# Patient Record
Sex: MALE | Race: BLACK OR AFRICAN AMERICAN
[De-identification: names, ages, dates, MRNs, and addresses within clinical notes are randomized per-mention and may not be internally consistent; named-entity substitution may affect disease eponyms.]

---

## 2019-10-07 ENCOUNTER — HOSPITAL ENCOUNTER (EMERGENCY)
Dept: HOSPITAL 9 - MADERS | Age: 74
Discharge: HOME | End: 2019-10-07
Payer: MEDICARE

## 2019-10-07 DIAGNOSIS — F41.9: ICD-10-CM

## 2019-10-07 DIAGNOSIS — Z79.899: ICD-10-CM

## 2019-10-07 DIAGNOSIS — S01.01XA: ICD-10-CM

## 2019-10-07 DIAGNOSIS — K21.9: ICD-10-CM

## 2019-10-07 DIAGNOSIS — Z23: ICD-10-CM

## 2019-10-07 DIAGNOSIS — Z79.891: ICD-10-CM

## 2019-10-07 DIAGNOSIS — F20.9: ICD-10-CM

## 2019-10-07 DIAGNOSIS — Z79.82: ICD-10-CM

## 2019-10-07 DIAGNOSIS — S06.0X0A: Primary | ICD-10-CM

## 2019-10-07 DIAGNOSIS — E11.9: ICD-10-CM

## 2019-10-07 DIAGNOSIS — Z79.84: ICD-10-CM

## 2019-10-07 DIAGNOSIS — I10: ICD-10-CM
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
## 2019-10-07 PROCEDURE — 90715 TDAP VACCINE 7 YRS/> IM: CPT

## 2019-10-07 PROCEDURE — 90471 IMMUNIZATION ADMIN: CPT

## 2019-10-07 PROCEDURE — 72125 CT NECK SPINE W/O DYE: CPT

## 2019-10-07 PROCEDURE — 70450 CT HEAD/BRAIN W/O DYE: CPT

## 2019-10-07 NOTE — CT
CT Brain WO Con



History: Trauma



Comparison: None.



Findings: No acute hemorrhage or infarct. No midline shift or mass effect. Ventricular size and extra
-axial CSF spaces are normal.



Since mucosal thickening right maxillary sinus with near-complete opacification.



Globes are intact.



Impression: No acute posttraumatic intracranial sequelae.



Reported By: Rafael Tabares 

Electronically Signed:  10/7/2019 8:44 PM

## 2019-10-07 NOTE — CT
CT Cervical Spine WO Con



History: Trauma



Comparison: None.



Findings: The occipital condyles are intact. The odontoid process is intact. Nuchal ligament ossifica
tion, chronic in nature.



Multifocal Schmorl's nodes and degenerative disc space disease, greatest at C5-T1. Large anterior jay
dging osteophytes C3-T1



No acute traumatic facet joint widening. Dense calcifications of both carotid bulbs/proximal internal
 carotid arteries.



The lung apices are clear.



Impression: Extensive degenerative changes. No acute fracture or malalignment of the cervical spine.



Reported By: Rafael Tabares 

Electronically Signed:  10/7/2019 8:46 PM

## 2019-12-04 ENCOUNTER — HOSPITAL ENCOUNTER (EMERGENCY)
Dept: HOSPITAL 9 - MADERS | Age: 74
Discharge: HOME | End: 2019-12-04
Payer: MEDICARE

## 2019-12-04 DIAGNOSIS — Z79.84: ICD-10-CM

## 2019-12-04 DIAGNOSIS — F17.210: ICD-10-CM

## 2019-12-04 DIAGNOSIS — F20.9: ICD-10-CM

## 2019-12-04 DIAGNOSIS — Z71.6: ICD-10-CM

## 2019-12-04 DIAGNOSIS — I10: ICD-10-CM

## 2019-12-04 DIAGNOSIS — F41.9: ICD-10-CM

## 2019-12-04 DIAGNOSIS — J06.9: Primary | ICD-10-CM

## 2019-12-04 DIAGNOSIS — E11.9: ICD-10-CM

## 2019-12-04 DIAGNOSIS — Z79.899: ICD-10-CM

## 2019-12-04 PROCEDURE — 99406 BEHAV CHNG SMOKING 3-10 MIN: CPT

## 2019-12-04 PROCEDURE — 71046 X-RAY EXAM CHEST 2 VIEWS: CPT

## 2019-12-04 PROCEDURE — 87804 INFLUENZA ASSAY W/OPTIC: CPT

## 2019-12-04 NOTE — RAD
EXAM:

Chest Two Views



12/4/2019 7:56 AM



HISTORY:

Cough



COMPARISON:

Single view chest radiograph dated February 25, 2014



FINDINGS:

Heart: Mild cardiomegaly is stable



Pulmonary vessels: Normal.



Costophrenic angles: Clear.



Lungs: No acute airspace consolidation.



Pneumothorax: None.



Osseous structures:Intact. There is scattered degenerative and osteoarthritic change present.



Additional findings:   None.



IMPRESSION:

Stable mild cardiomegaly. No acute cardiopulmonary abnormality.



Reported By: Wilfredo Wright 

Electronically Signed:  12/4/2019 7:56 AM

## 2020-09-27 ENCOUNTER — HOSPITAL ENCOUNTER (INPATIENT)
Dept: HOSPITAL 92 - ERS | Age: 75
LOS: 1 days | Discharge: LEFT BEFORE DISCHARGE | DRG: 308 | End: 2020-09-28
Attending: STUDENT IN AN ORGANIZED HEALTH CARE EDUCATION/TRAINING PROGRAM | Admitting: STUDENT IN AN ORGANIZED HEALTH CARE EDUCATION/TRAINING PROGRAM
Payer: MEDICARE

## 2020-09-27 ENCOUNTER — HOSPITAL ENCOUNTER (EMERGENCY)
Dept: HOSPITAL 9 - MADERS | Age: 75
Discharge: ANOTHER HOSPITAL | End: 2020-09-27
Payer: MEDICARE

## 2020-09-27 VITALS — BODY MASS INDEX: 37 KG/M2

## 2020-09-27 DIAGNOSIS — I11.0: ICD-10-CM

## 2020-09-27 DIAGNOSIS — F20.9: ICD-10-CM

## 2020-09-27 DIAGNOSIS — E78.5: ICD-10-CM

## 2020-09-27 DIAGNOSIS — Z90.49: ICD-10-CM

## 2020-09-27 DIAGNOSIS — I25.10: ICD-10-CM

## 2020-09-27 DIAGNOSIS — F41.9: ICD-10-CM

## 2020-09-27 DIAGNOSIS — Z53.29: ICD-10-CM

## 2020-09-27 DIAGNOSIS — Z79.84: ICD-10-CM

## 2020-09-27 DIAGNOSIS — K21.9: ICD-10-CM

## 2020-09-27 DIAGNOSIS — I51.7: ICD-10-CM

## 2020-09-27 DIAGNOSIS — E11.9: ICD-10-CM

## 2020-09-27 DIAGNOSIS — I11.0: Primary | ICD-10-CM

## 2020-09-27 DIAGNOSIS — Z79.899: ICD-10-CM

## 2020-09-27 DIAGNOSIS — Z79.82: ICD-10-CM

## 2020-09-27 DIAGNOSIS — Z20.828: ICD-10-CM

## 2020-09-27 DIAGNOSIS — F17.220: ICD-10-CM

## 2020-09-27 DIAGNOSIS — R07.89: ICD-10-CM

## 2020-09-27 DIAGNOSIS — M10.9: ICD-10-CM

## 2020-09-27 DIAGNOSIS — R79.89: ICD-10-CM

## 2020-09-27 DIAGNOSIS — Z87.891: ICD-10-CM

## 2020-09-27 DIAGNOSIS — I48.91: Primary | ICD-10-CM

## 2020-09-27 DIAGNOSIS — E87.70: ICD-10-CM

## 2020-09-27 DIAGNOSIS — I50.23: ICD-10-CM

## 2020-09-27 DIAGNOSIS — I48.91: ICD-10-CM

## 2020-09-27 DIAGNOSIS — E83.42: ICD-10-CM

## 2020-09-27 DIAGNOSIS — I50.9: ICD-10-CM

## 2020-09-27 LAB
ALBUMIN SERPL BCG-MCNC: 3.8 G/DL (ref 3.4–4.8)
ALP SERPL-CCNC: 70 U/L (ref 40–110)
ALT SERPL W P-5'-P-CCNC: 13 U/L (ref 8–55)
ANION GAP SERPL CALC-SCNC: 19 MMOL/L (ref 10–20)
AST SERPL-CCNC: 19 U/L (ref 5–34)
BILIRUB SERPL-MCNC: 0.4 MG/DL (ref 0.2–1.2)
BUN SERPL-MCNC: 14 MG/DL (ref 8.4–25.7)
CALCIUM SERPL-MCNC: 8.9 MG/DL (ref 7.8–10.44)
CHLORIDE SERPL-SCNC: 105 MMOL/L (ref 98–107)
CK MB SERPL-MCNC: 2 NG/ML (ref 0–6.6)
CO2 SERPL-SCNC: 19 MMOL/L (ref 23–31)
CREAT CL PREDICTED SERPL C-G-VRATE: 0 ML/MIN (ref 70–130)
GLOBULIN SER CALC-MCNC: 3.4 G/DL (ref 2.4–3.5)
GLUCOSE SERPL-MCNC: 167 MG/DL (ref 83–110)
HGB BLD-MCNC: 13.2 G/DL (ref 14–18)
MAGNESIUM SERPL-MCNC: 1.7 MG/DL (ref 1.6–2.6)
MCH RBC QN AUTO: 27.4 PG (ref 27–31)
MCV RBC AUTO: 86 FL (ref 78–98)
MDIFF COMPLETE?: YES
PLATELET # BLD AUTO: 321 THOU/UL (ref 130–400)
POTASSIUM SERPL-SCNC: 3.9 MMOL/L (ref 3.5–5.1)
RBC # BLD AUTO: 4.82 MILL/UL (ref 4.7–6.1)
REFLEX FOR REVIEW??: NO
SODIUM SERPL-SCNC: 139 MMOL/L (ref 136–145)
TROPONIN I SERPL DL<=0.01 NG/ML-MCNC: 0.05 NG/ML (ref ?–0.03)
WBC # BLD AUTO: 7.2 THOU/UL (ref 4.8–10.8)

## 2020-09-27 PROCEDURE — 85730 THROMBOPLASTIN TIME PARTIAL: CPT

## 2020-09-27 PROCEDURE — 85025 COMPLETE CBC W/AUTO DIFF WBC: CPT

## 2020-09-27 PROCEDURE — 93017 CV STRESS TEST TRACING ONLY: CPT

## 2020-09-27 PROCEDURE — G0378 HOSPITAL OBSERVATION PER HR: HCPCS

## 2020-09-27 PROCEDURE — 82553 CREATINE MB FRACTION: CPT

## 2020-09-27 PROCEDURE — 96374 THER/PROPH/DIAG INJ IV PUSH: CPT

## 2020-09-27 PROCEDURE — 99285 EMERGENCY DEPT VISIT HI MDM: CPT

## 2020-09-27 PROCEDURE — 36415 COLL VENOUS BLD VENIPUNCTURE: CPT

## 2020-09-27 PROCEDURE — 96366 THER/PROPH/DIAG IV INF ADDON: CPT

## 2020-09-27 PROCEDURE — 96375 TX/PRO/DX INJ NEW DRUG ADDON: CPT

## 2020-09-27 PROCEDURE — 96367 TX/PROPH/DG ADDL SEQ IV INF: CPT

## 2020-09-27 PROCEDURE — U0003 INFECTIOUS AGENT DETECTION BY NUCLEIC ACID (DNA OR RNA); SEVERE ACUTE RESPIRATORY SYNDROME CORONAVIRUS 2 (SARS-COV-2) (CORONAVIRUS DISEASE [COVID-19]), AMPLIFIED PROBE TECHNIQUE, MAKING USE OF HIGH THROUGHPUT TECHNOLOGIES AS DESCRIBED BY CMS-2020-01-R: HCPCS

## 2020-09-27 PROCEDURE — 93005 ELECTROCARDIOGRAM TRACING: CPT

## 2020-09-27 PROCEDURE — 83880 ASSAY OF NATRIURETIC PEPTIDE: CPT

## 2020-09-27 PROCEDURE — 78452 HT MUSCLE IMAGE SPECT MULT: CPT

## 2020-09-27 PROCEDURE — 84443 ASSAY THYROID STIM HORMONE: CPT

## 2020-09-27 PROCEDURE — 36416 COLLJ CAPILLARY BLOOD SPEC: CPT

## 2020-09-27 PROCEDURE — 93306 TTE W/DOPPLER COMPLETE: CPT

## 2020-09-27 PROCEDURE — 80048 BASIC METABOLIC PNL TOTAL CA: CPT

## 2020-09-27 PROCEDURE — 80053 COMPREHEN METABOLIC PANEL: CPT

## 2020-09-27 PROCEDURE — 71045 X-RAY EXAM CHEST 1 VIEW: CPT

## 2020-09-27 PROCEDURE — 83735 ASSAY OF MAGNESIUM: CPT

## 2020-09-27 PROCEDURE — 94760 N-INVAS EAR/PLS OXIMETRY 1: CPT

## 2020-09-27 PROCEDURE — 96365 THER/PROPH/DIAG IV INF INIT: CPT

## 2020-09-27 PROCEDURE — 93010 ELECTROCARDIOGRAM REPORT: CPT

## 2020-09-27 PROCEDURE — 87635 SARS-COV-2 COVID-19 AMP PRB: CPT

## 2020-09-27 PROCEDURE — 84484 ASSAY OF TROPONIN QUANT: CPT

## 2020-09-27 PROCEDURE — A9500 TC99M SESTAMIBI: HCPCS

## 2020-09-27 NOTE — RAD
Chest AP view



INDICATION: Chest pain with shortness of breath



COMPARISON: December 4, 2019



FINDINGS:



Lungs:  The lungs are clear



Cardiac silhouette:  Stable mild cardiomegaly



Pulmonary vasculature:  Normal



Pleural spaces:  No pleural effusion or pneumothorax is demonstrated.



Upper abdomen:  No abnormality seen.



Osseous structures:  No acute osseous abnormality.



Additional findings:  None.



IMPRESSION: 

Stable mild cardiomegaly



Reported By: Wilfredo Wright 

Electronically Signed:  9/27/2020 2:57 PM

## 2020-09-27 NOTE — PDOC.HHP
Hospitalist HPI





- History of Present Illness


chest pain, a-fib


History of Present Illness: 





Mr. Wick is a 75 year-old male with a PMHx of T2DM, HTN, HLD< gout, anxiety, 

schizophrenia, tobacco abuse who was transferred from [facility] for chest pain 

r/o and new-onset a-fib. Mr. Wick reports that over the past two weeks he has 

had chest discomforts which he describes as a pressure at the bottom of his hoa

rnum. Also endorses sour-stomach, but denies SOB, palpitations, dizziness. 

Denies peluritic pain. Pain is intermittent and not associated with food. Denies

aggravating/alleviating factors. States he has been to the ER a few times a year

for chest pain, but that they never find anything. No cardiac history. Pt 

reports his ENT doctor told him he was in a-fib once when he listened to his 

heart, but has never seen a cardiologist or had his PCP work this up. 





CHADsVASc score of 4 (CHF, HTN, Age, DM). Patient denies any history of 

uncontrolled HTN, GIB, or any other bleeding disorders. 





In the ED vital signs 125/87, 106, 20, 99.0, 98% on RA. EKG showed a-fib with 

ventricular rate of 110. CXR with mild cardiomegaly. BNP elevated to 395, 

Troponin elevated at 0.040, H/H 13.2/41.5, WBC 7.2. CKMB 2.0. Na 139, K 3.0, 

BUN/Cr 14/1.47. Patient received 325 mg of ASA and 20 mg furosemide IV.   





Hospitalist ROS





- Review of Systems


Constitutional: denies: fever, chills, sweats, weakness, malaise, other


Eyes: denies: pain, vision change, conjunctivae inflammation, eyelid 

inflammation, redness, other


ENT: denies: ear pain, ear discharge, nose pain, nose discharge, nose 

congestion, mouth pain, mouth swelling, throat pain, throat swelling, other


Respiratory: denies: cough, dry, shortness of breath, hemoptysis, SOB with 

excertion, pleuritic pain, sputum, wheezing, other


Cardiovascular: reports: chest pain.  denies: palpitations, orthopnea, 

paroxysmal noc. dyspnea, edema, light headedness, other


Gastrointestinal: denies: nausea, vomiting, abdominal pain, diarrhea, 

constipation, melena, hematochezia, other


Genitourinary: denies: dysuria, frequency, incontinence, hematuria, retention, 

other


Musculoskeletal: denies: neck pain, shoulder pain, arm pain, back pain, hand 

pain, leg pain, foot pain, other


Skin: denies: rash, lesions, soren, bruising, other


Neurological: denies: weakness, numbness, incoordination, change in speech, 

confusion, seizures, other





- Medication


Medications: 


Home medications include: 





HCTZ 25 mg daily


Risperdal 0.5 mg qhs


Simethicone 80 mg with meals


Atorvastatin 10 mg daily


Protonix 40 mg daily


Metfromin 500 mg BID


ASA 81 mg daily


Amlodipine 10 mg daily


Naproxen 250 mg daily


VItamin D


Trazodone 100 mg qhs 


Fluoxetine 20 mg daily


allopurinol 300 mg daily


loratadine 10 mg prn


hydroxyzine 50 mg prn anxiety


ativan 2 mg prn anxiety





NKDA





Hospitalist History





- Past Medical History


Other Medical History: 





Type 2 DM


HTN


HLD


Schizophrenia


Gout


Tobacco use


GERD





No history of cardiac disease, although pt states he has had episodes of chest 

pain in the past which have all been negative. Has been years since had a stress

test. 





- Past Surgical History


Other Surgical History: 





appendectomy





- Family History


Other Family History: 





cardiac 





- Social History


Smoking Status: Current every day smoker


Tobacco Type: snuff


Alcohol: reports: None


Drugs: reports: none


Living Situation: With Family


Activity level: independent ambulation





- Exam


General Appearance: NAD, awake alert


Eye: PERRL, anicteric sclera


ENT: normocephalic atraumatic, no oropharyngeal lesions, moist mucosa


Neck: supple, symmetric, no JVD, no thyromegaly, no lymphadenopathy, no carotid 

bruit


Heart: no murmur, no gallops, no rubs, normal peripheral pulses, irregular


Respiratory: CTAB, no wheezes, no rales, no ronchi, normal chest expansion, no 

tachypnea, normal percussion


Gastrointestinal: soft, non-tender, non-distended, normal bowel sounds, no 

palpable masses, no hepatomegaly, no splenomegaly, no bruit


Extremities: no cyanosis, 2+ LE edema


Skin: normal turgor, no lesions, no rashes


Neurological: cranial nerve grossly intact, normal sensation to touch, no 

weakness, no focal deficits, no new deficit


Musculoskeletal: normal tone, normal strength, no muscle wasting


Psychiatric: normal affect, normal behavior, A&O x 3





Hospitalist Results





- Labs


Result Diagrams: 


                                 09/27/20 22:58








Hospitalist H&P A/P





- Problem


(1) Chest pain


Code(s): R07.9 - CHEST PAIN, UNSPECIFIED   Status: Acute   





(2) Atrial fibrillation


Code(s): I48.91 - UNSPECIFIED ATRIAL FIBRILLATION   Status: Acute   





(3) Elevated troponin


Code(s): R79.89 - OTHER SPECIFIED ABNORMAL FINDINGS OF BLOOD CHEMISTRY   Status:

Acute   





(4) Type 2 diabetes mellitus


Status: Acute   





(5) Hypertension


Code(s): I10 - ESSENTIAL (PRIMARY) HYPERTENSION   Status: Acute   





(6) Hyperlipidemia


Code(s): E78.5 - HYPERLIPIDEMIA, UNSPECIFIED   Status: Acute   





(7) GERD (gastroesophageal reflux disease)


Code(s): K21.9 - GASTRO-ESOPHAGEAL REFLUX DISEASE WITHOUT ESOPHAGITIS   Status: 

Acute   





(8) Schizophrenia


Code(s): F20.9 - SCHIZOPHRENIA, UNSPECIFIED   Status: Acute   





(9) Gout


Code(s): M10.9 - GOUT, UNSPECIFIED   Status: Acute   





- Plan


Plan: 





Chest pain: 


75M hx of HTN, HLD, T2DM presents with 2 weeks of intermittent chest pain 

described as a substernal pressure found to be in atrial fibrillation. Initial 

troponin 0.040. EKG showed a-fib with ventricular rate of 110. Pt HD stable. 

Received  at OSH. Pt denies history of cardiac problems. Last stress test

was years ago. 





PLAN:


-Telemetry


-Trend troponins


-Stress test in am if troponins do not rise


-Echocardiogram





Atrial fibrillation:


New onset a-fib in 75M. CHADsVASc score 4. Pt denies history of GIB, but does 

take naproxen and ASA regularly. No recent surgeries or bleeding disorders. 

Patient describes chest discomfort likely 2/2 to his a-fib for the past two 

weeks, likely out of the 48 hr window. Will initiate anticoagulation with 

heparin drip and initiate rate control with IV metoprolol. Cardiology input 

appreciated. 





PLAN:


-Echocardiogram


-TSH, Mg, Ca, Phos


-Cardiology consult


-Metoprolol 


-AC with heparin drip





Elevation in Cr level: 


BUN/Cr 14/1.47. Unsure of baseline, but Cr in 2015 was 1.1, ? MARIO. Pt denies 

history of CKD, although has not seen PCP in years. May be component of 

cardiorenal 2/2 CHF, so will hold off on IVF and monitor at this time.





PLAN:


-Trend renal function


-Avoid nephrotoxic medications when possible 


 


Congestive Heart Failure: 


Pt p/w chest discomfort. Found to have mild JVD and 2+ LE edema on exam. BNP 

elevated to 395. CXR with stable cardiomegaly. Received 20 mg lasix IV at OSH. 

Lungs clear. 2+ edema. Respiratory status stable. No echo on file. Pt denies 

history of CHF. 





PLAN:


-Echocardiogram


-Cardiology consult


-Continue to monitor respiratory and fluid status





Hypomagnesemia: 


Mg low normal of 1.7. Will replete to maintain >2 for cardiac optimization. 





PLAN:


-Magnesium 2 g IV


-Monitor and replete as needed





Type 2 DM: 


Hx of T2DM on metformin. Will hold in setting of elevated Cr. 





PLAN:


-ISS mild


-CC diet


-ACHS glucose checks





Schizophrenia: 


Hx of schizophrenia on risperdal, ativan, fluoxetine, and traxodone. Will 

continue home medications. Mood stable. 





Hypertension: 


On home amlodipine, HCTZ. Will hold to allow for room in BP to rate-control a-

fib.  





Hyperlipidemia: 


On home atorvastatin. Continue. Will check lipid level in am. 





GERD:


On home protonix, simethicone. Will continue home meds. 





Gout: 


Continue home allopurinol. Pt denies any current symptoms. 





DVT prophylaxis: Heparin drip 2/2 a-fib


FULL CODE: MDM Pt's sister Boubacar Wick.





Case discussed with attending physician, Dr. Renee.

## 2020-09-28 VITALS — DIASTOLIC BLOOD PRESSURE: 88 MMHG | SYSTOLIC BLOOD PRESSURE: 136 MMHG | TEMPERATURE: 99.1 F

## 2020-09-28 LAB
ANION GAP SERPL CALC-SCNC: 14 MMOL/L (ref 10–20)
BUN SERPL-MCNC: 17 MG/DL (ref 8.4–25.7)
CALCIUM SERPL-MCNC: 8.8 MG/DL (ref 7.8–10.44)
CHLORIDE SERPL-SCNC: 107 MMOL/L (ref 98–107)
CK MB SERPL-MCNC: 1.6 NG/ML (ref 0–6.6)
CO2 SERPL-SCNC: 21 MMOL/L (ref 23–31)
CREAT CL PREDICTED SERPL C-G-VRATE: 66 ML/MIN (ref 70–130)
GLUCOSE SERPL-MCNC: 124 MG/DL (ref 83–110)
HGB BLD-MCNC: 13.1 G/DL (ref 14–18)
HGB BLD-MCNC: 13.4 G/DL (ref 14–18)
MAGNESIUM SERPL-MCNC: 2.1 MG/DL (ref 1.6–2.6)
MCH RBC QN AUTO: 30.1 PG (ref 27–31)
MCV RBC AUTO: 88.5 FL (ref 78–98)
MDIFF COMPLETE?: YES
PLATELET # BLD AUTO: 259 THOU/UL (ref 130–400)
PLATELET # BLD AUTO: 278 THOU/UL (ref 130–400)
POTASSIUM SERPL-SCNC: 4.3 MMOL/L (ref 3.5–5.1)
RBC # BLD AUTO: 4.46 MILL/UL (ref 4.7–6.1)
SODIUM SERPL-SCNC: 138 MMOL/L (ref 136–145)
TROPONIN I SERPL DL<=0.01 NG/ML-MCNC: 0.05 NG/ML (ref ?–0.03)
WBC # BLD AUTO: 7 THOU/UL (ref 4.8–10.8)

## 2020-09-28 NOTE — CON
DATE OF CONSULTATION:  



HISTORY:  Louie Wick is a 75-year-old black male, who was transferred from

[facility] for evaluation of chest discomfort and atrial 
fibrillation.

Over the last 2 weeks, he did have episodes of chest pressure, some of which 
would

last for 1 or 2 days continuously.  He also has had some shortness of breath and

palpitations.  He was told approximately 2 years ago by his allergist that he 
had an

abnormal heart rhythm and was probably in atrial fibrillation.  He has not had 
that

evaluated though.  He has been found to have an abnormal Cardiolite and 
Cardiology

consultation is requested.  At the present time, the patient denies any 
symptoms. 



PAST MEDICAL HISTORY:  Remarkable for hypertension, diabetes, 
hypercholesterolemia,

schizophrenia, gout, and GERD. 



PAST SURGICAL HISTORY:  Appendectomy.



MEDICATIONS:  

1. Hydrochlorothiazide daily.  

2. Risperdal 0.5 at bedtime. 

3. Atorvastatin 10 mg daily.

4. Protonix 40 daily.

5. Metformin 500 b.i.d.

6. Aspirin 81 daily.

7. Amlodipine 10 mg daily.

8. Naproxen 250 2 tablets q.a.m.

9. Trazodone 100 mg at bedtime.

10. Fluoxetine 20 daily.

11. Allopurinol 300 daily.

12. Loratadine 10 mg p.r.n.

13. Hydroxyzine 50 mg p.r.n.

14. Ativan 2 mg p.r.n. anxiety.



ALLERGIES:  NONE.



SOCIAL HISTORY:  He smokes 4 or 5 cigarettes per day and dips snuff.  He does 
not

drink alcohol. 



REVIEW OF SYSTEMS:  A 10-point review of systems unremarkable.



PHYSICAL EXAMINATION:

VITAL SIGNS: Blood pressure 112/57, pulse of 80. 

HEENT: PERRL. 

NECK:  Supple. 

CHEST:  Clear. 

CARDIAC:  S1 and S2 normal without any S3, S4, or murmurs.  

ABDOMEN: Normal bowel sounds without tenderness or organomegaly. 

EXTREMITIES: Revealed no clubbing, cyanosis, or edema.  

NEUROLOGIC: Grossly intact. 

SKIN: Warm and dry.



LABORATORY DATA:  EKG reveals atrial fibrillation with nonspecific ST and T-wave

changes.  Hemoglobin 13.4, hematocrit 39.5, white count 7000, platelets 259,000.

Sodium 138, potassium 4.3, chloride 107 carbon dioxide 21, BUN 17, creatinine 
1.31;

creatinine was 1.47 at admission.  Troponin I is 0.054.  TSH is normal.  
Adenosine

Cardiolite test revealed a fixed anteroseptal and posterior inferior wall 
defects

There was no evidence of reversible ischemia.  Ejection fraction was 23% with

global hypokinesis, especially the septum. 



IMPRESSION:  

1. Severe left ventricular dysfunction with ejection fraction on Cardiolite of 
23%.

2. Fixed myocardial defects of the anteroseptal wall and the posterior inferior

wall, probable coronary artery disease. 

3. Hypertension. 

4. Diabetes.

5. Hypercholesterolemia.

6. Smoker.

7. History of schizophrenia.



PLAN:  With left ventricular dysfunction, metoprolol will be discontinued, and

instead, he will be transitioned to carvedilol.  Echocardiogram will be 
performed to

better assess left ventricular function.  With his abnormal Cardiolite and 
multiple

cardiac risk factors, it is recommended that he undergo cardiac catheterization.

Risks of this were discussed including death, myocardial infarction, dye 
reaction,

vascular injury, CVA, transfusion, limb loss, renal loss, etc.  Also, risk of

intervention with PTCA and stent placement were discussed including death,

myocardial infarction, emergent CABG, restenosis, stent thrombosis, vessel

perforation, etc.  He has no history of GI bleeding or stroke or upcoming 
surgeries

and a drug-eluting stent will be placed if needed.  Also, with his renal

insufficiency, I would not renew his home medicines of hydrochlorothiazide and

Naprosyn. 







Job ID:  [id number]



BERHANE

## 2020-09-28 NOTE — PDOC.HOSPP
- Subjective


Encounter Date: 09/28/20


Encounter Time: 09:00


Subjective: 


no overnight events. this morning, feeling well and has no complaints. Chest 

pain resolved. Requested to leave but explained regarding current condition, 

risk of treatment and nontreatment. Reiterated and expressed understanding, 

decisional capacity. 





- Objective


Vital Signs & Weight: 


                             Vital Signs (12 hours)











  Temp Pulse Resp BP Pulse Ox


 


 09/28/20 14:02  99.1 F  85  18  136/88  99








                                     Weight











Weight                         215 lb 11.2 oz














I&O: 


                                        











 09/27/20 09/28/20 09/29/20





 06:59 06:59 06:59


 


Intake Total  535 


 


Output Total  150 


 


Balance  385 











Result Diagrams: 


                                 09/28/20 04:20





                                 09/28/20 04:20


Additional Labs: 


                                   Accuchecks











  09/28/20 09/28/20





  14:05 06:01


 


POC Glucose  120 H  108 H














Hospitalist ROS





- Review of Systems


Constitutional: denies: chills, sweats


Respiratory: denies: cough, shortness of breath, SOB with excertion


Cardiovascular: reports: edema.  denies: chest pain, palpitations, orthopnea, 

paroxysmal noc. dyspnea


Gastrointestinal: denies: nausea, vomiting, abdominal pain


Genitourinary: denies: dysuria, frequency, hematuria





- Exam


General Appearance: NAD, awake alert


Neck: JVD


Heart: no murmur, no gallops, irregular


Heart - other findings: variable aflut on telemetry


Respiratory: CTAB, no wheezes, no rales, no ronchi


Gastrointestinal: soft, non-tender, non-distended, normal bowel sounds


Extremities - other findings: b/l equal pitting edema to knee level


Psychiatric: normal affect, A&O x 3


Psychiatric - other findings: hypomanic





Hosp A/P





- Plan


#chest pain


-atypical pain, intermediate pretest probability;





-pending stress test, cardiology evaluation





#atrial fibrillation


per patient, was diagnosed 3 years ago but not anticoagulated


currently on heparin based on CHADSVASC, rate well controlled





continue current management





Full code


ELOS 1-2 nights

## 2020-09-28 NOTE — NM
NUCLEAR MEDICINE

CARDIAC MYOCARDIAL PERFUSION SPECT

EJECTION FRACTION STUDY

WALL MOTION CINE:



DATE:

9/28/2020



HISTORY:

75-year-old male smoker with dyslipidemia, diabetes mellitus, hypertension, and atrial fibrillation, 
presents with chest pain



TECHNIQUE:

Number of days: 1

Rest study: Technetium 99m-sestamibi (Cardiolite) dose:9.7 mCi

Pharmacologic stress: Lexiscan dose: 0.4 mg

Stress study: Technetium 99m-sestamibi (Cardiolite) dose:27.6 mCi



FINDINGS:



CARDIAC (MYOCARDIAL PERFUSION) SPECT

Fixed myocardial perfusion defect at anteroseptal wall. Apparent fixed defect at posterior inferior w
all. No reversible perfusion defect identified..



EJECTION FRACTION STUDY

Left ventricular EF = 23 %



WALL MOTION CINE

Global hypokinesis, especially septum



IMPRESSION:

1) Global hypokinesis and very low left ventricular ejection fraction of 23%.

2) infarction/scars.

3) no reversible ischemia identified



Reported By: Andrey Pagan 

Electronically Signed:  9/28/2020 2:12 PM

## 2020-09-28 NOTE — PDOC.EVN
Event Note





- Event Note


Event Note: 





notified at 4:30pm that patient requests to leave AMA. Spoke with patient and 

informed patient regarding stress test results, their meaning, and his possible 

risk for sudden death. Patient said he would stay. About 5 minutes later, 

patient decided to leave AMA. Called again and asked patient to stay but patient

claimed he had social issues that had to be taken care of. Asked patient to see 

his primary care physician as soon as possible to resume care.

## 2020-09-29 NOTE — EKG
Test Reason : CP

Blood Pressure : ***/*** mmHG

Vent. Rate : 104 BPM     Atrial Rate : 267 BPM

   P-R Int : 000 ms          QRS Dur : 110 ms

    QT Int : 372 ms       P-R-T Axes : 000 038 237 degrees

   QTc Int : 489 ms

 

Atrial fibrillation

Non-specific intra-ventricular conduction delay

Nonspecific ST and T wave abnormality

Abnormal ECG

 

Confirmed by WILMER STAHL ([id number]) on 9/29/2020 4:06:00 PM

 

Referred By:  DAMARIS           Confirmed By:WILMER STAHL

## 2020-10-01 NOTE — PQF
CLINICAL DOCUMENTATION CLARIFICATION FORM: 



Dear : Wm Pastor MD                    Date / Time: 10/01/2020

Please exercise your independent, professional judgment in responding to the 
clarification form. 

Clinical indicators are provided on the bottom of this form for your review



Please check appropriate box(es):

HEART FAILURE:

A.   ACUITY



[  ] Acute          [ x ] Acute on Chronic          [  ] Chronic



B.   TYPE:

      [x  ] Systolic / HFrEF      [  ] Diastolic / HFpEF       [  ] Combined 
Systolic / Diastolic

      [  ] Hypertensive Heart Disease

            [  ] Other diagnosis ___________(Please specify if any)

            [  ] Unable to determine



Physician Signature:                         Date/Time:



For continuity of documentation, please document condition throughout progress 
notes and discharge summary.  Thank You.

To be completed by CDI/Coding staff for physician review: 







 Present Clinical Indicators - Signs / Symptoms / Labs Results and Location in 

Medical Record

 

[ x] Ejection Fraction = 23 % Stress test nuclear medicine on 09/28

 

[  ] Dyspnea, Hypoxia 

 

[ x ] Peripheral edema 2+LE edema on exam  - H&P on 09/27

 

[ x ] Elevated BNP BNP:395 - ED provider reports on 09/28

 

[ x ] JVD Mild JVD  H&P on 09/27

 

[ x ] Orthopnea / SOB / dyspnea Reports SOB - ED provider reports on 09/28

 

[  ] Pleural effusion / pulmonary edema 

 

[ x ] CXR results CXR with stable cardiomegaly  H&P on 09/27

 

[  ] Arrhythmia--tachycardia 

 

Present Risk Factors                                                            
             Results and Location in Medical Record

 

[  ] History of CAD/ischemic heart disease 

 

[x  ] CKD

Hypertension HTN  - ED provider reports on 09/28

 

[  ] History of MI 

 

Present Treatments                                                              
               Results and Location in Medical Record

 

[  ] Administration of ACE / ARB / BB 

 

[  x ] Received 20 mg lasix IV at OSH H&P on 09/27

 

[ x ] Patient received 325 mg of ASA and 20mg furosemide IV H&P on 09/27

 

[  ] Oxygen 

 

[  ] AICD 

 

[x] Cardiology Consult  Consult on 09/28





CDS/ Signature: AAS            Phone #: _____________        Date/Time: 
10/01/2020



                 This is a permanent part of the Medical Record

[facility]

## 2020-10-02 ENCOUNTER — HOSPITAL ENCOUNTER (EMERGENCY)
Dept: HOSPITAL 92 - ERS | Age: 75
Discharge: LEFT BEFORE DISCHARGE | End: 2020-10-02
Payer: MEDICARE

## 2020-10-02 DIAGNOSIS — Z53.21: Primary | ICD-10-CM

## 2020-10-02 PROCEDURE — 36416 COLLJ CAPILLARY BLOOD SPEC: CPT

## 2020-10-15 ENCOUNTER — HOSPITAL ENCOUNTER (OUTPATIENT)
Dept: HOSPITAL 92 - LABBT | Age: 75
Discharge: HOME | End: 2020-10-15
Attending: INTERNAL MEDICINE
Payer: MEDICARE

## 2020-10-15 DIAGNOSIS — Z20.828: ICD-10-CM

## 2020-10-15 DIAGNOSIS — Z01.818: Primary | ICD-10-CM

## 2020-10-15 LAB
ALBUMIN SERPL BCG-MCNC: 3.7 G/DL (ref 3.4–4.8)
ALP SERPL-CCNC: 76 U/L (ref 40–110)
ALT SERPL W P-5'-P-CCNC: 14 U/L (ref 8–55)
ANION GAP SERPL CALC-SCNC: 14 MMOL/L (ref 10–20)
AST SERPL-CCNC: 17 U/L (ref 5–34)
BASOPHILS # BLD AUTO: 0 THOU/UL (ref 0–0.2)
BASOPHILS NFR BLD AUTO: 0.7 % (ref 0–1)
BILIRUB SERPL-MCNC: 0.6 MG/DL (ref 0.2–1.2)
BUN SERPL-MCNC: 14 MG/DL (ref 8.4–25.7)
CALCIUM SERPL-MCNC: 9.1 MG/DL (ref 7.8–10.44)
CHLORIDE SERPL-SCNC: 108 MMOL/L (ref 98–107)
CO2 SERPL-SCNC: 21 MMOL/L (ref 23–31)
CREAT CL PREDICTED SERPL C-G-VRATE: 0 ML/MIN (ref 70–130)
EOSINOPHIL # BLD AUTO: 0.2 THOU/UL (ref 0–0.7)
EOSINOPHIL NFR BLD AUTO: 3 % (ref 0–10)
GLOBULIN SER CALC-MCNC: 3.1 G/DL (ref 2.4–3.5)
GLUCOSE SERPL-MCNC: 85 MG/DL (ref 83–110)
HGB BLD-MCNC: 12.6 G/DL (ref 14–18)
LYMPHOCYTES # BLD: 2.9 THOU/UL (ref 1.2–3.4)
LYMPHOCYTES NFR BLD AUTO: 43.9 % (ref 21–51)
MCH RBC QN AUTO: 28.6 PG (ref 27–31)
MCV RBC AUTO: 87.4 FL (ref 78–98)
MONOCYTES # BLD AUTO: 0.6 THOU/UL (ref 0.11–0.59)
MONOCYTES NFR BLD AUTO: 9.7 % (ref 0–10)
NEUTROPHILS # BLD AUTO: 2.8 THOU/UL (ref 1.4–6.5)
NEUTROPHILS NFR BLD AUTO: 42.7 % (ref 42–75)
PLATELET # BLD AUTO: 277 THOU/UL (ref 130–400)
POTASSIUM SERPL-SCNC: 4.6 MMOL/L (ref 3.5–5.1)
RBC # BLD AUTO: 4.39 MILL/UL (ref 4.7–6.1)
SODIUM SERPL-SCNC: 138 MMOL/L (ref 136–145)
WBC # BLD AUTO: 6.6 THOU/UL (ref 4.8–10.8)

## 2020-10-15 PROCEDURE — 85025 COMPLETE CBC W/AUTO DIFF WBC: CPT

## 2020-10-15 PROCEDURE — 80053 COMPREHEN METABOLIC PANEL: CPT

## 2020-10-15 PROCEDURE — 87635 SARS-COV-2 COVID-19 AMP PRB: CPT

## 2020-10-15 PROCEDURE — U0003 INFECTIOUS AGENT DETECTION BY NUCLEIC ACID (DNA OR RNA); SEVERE ACUTE RESPIRATORY SYNDROME CORONAVIRUS 2 (SARS-COV-2) (CORONAVIRUS DISEASE [COVID-19]), AMPLIFIED PROBE TECHNIQUE, MAKING USE OF HIGH THROUGHPUT TECHNOLOGIES AS DESCRIBED BY CMS-2020-01-R: HCPCS

## 2020-10-15 PROCEDURE — 93005 ELECTROCARDIOGRAM TRACING: CPT

## 2020-10-15 PROCEDURE — 93010 ELECTROCARDIOGRAM REPORT: CPT

## 2020-10-20 ENCOUNTER — HOSPITAL ENCOUNTER (INPATIENT)
Dept: HOSPITAL 92 - SDC | Age: 75
LOS: 3 days | Discharge: HOME | DRG: 287 | End: 2020-10-23
Attending: INTERNAL MEDICINE | Admitting: INTERNAL MEDICINE
Payer: MEDICARE

## 2020-10-20 VITALS — BODY MASS INDEX: 37.5 KG/M2

## 2020-10-20 DIAGNOSIS — E11.22: ICD-10-CM

## 2020-10-20 DIAGNOSIS — F20.9: ICD-10-CM

## 2020-10-20 DIAGNOSIS — E78.5: ICD-10-CM

## 2020-10-20 DIAGNOSIS — Z20.828: ICD-10-CM

## 2020-10-20 DIAGNOSIS — F17.210: ICD-10-CM

## 2020-10-20 DIAGNOSIS — I48.91: ICD-10-CM

## 2020-10-20 DIAGNOSIS — Z90.49: ICD-10-CM

## 2020-10-20 DIAGNOSIS — I25.10: Primary | ICD-10-CM

## 2020-10-20 DIAGNOSIS — K21.9: ICD-10-CM

## 2020-10-20 DIAGNOSIS — I12.9: ICD-10-CM

## 2020-10-20 DIAGNOSIS — M10.9: ICD-10-CM

## 2020-10-20 DIAGNOSIS — N18.30: ICD-10-CM

## 2020-10-20 DIAGNOSIS — I42.8: ICD-10-CM

## 2020-10-20 PROCEDURE — 99152 MOD SED SAME PHYS/QHP 5/>YRS: CPT

## 2020-10-20 PROCEDURE — 80048 BASIC METABOLIC PNL TOTAL CA: CPT

## 2020-10-20 PROCEDURE — B2151ZZ FLUOROSCOPY OF LEFT HEART USING LOW OSMOLAR CONTRAST: ICD-10-PCS | Performed by: INTERNAL MEDICINE

## 2020-10-20 PROCEDURE — 4A023N7 MEASUREMENT OF CARDIAC SAMPLING AND PRESSURE, LEFT HEART, PERCUTANEOUS APPROACH: ICD-10-PCS | Performed by: INTERNAL MEDICINE

## 2020-10-20 PROCEDURE — G0378 HOSPITAL OBSERVATION PER HR: HCPCS

## 2020-10-20 PROCEDURE — 80306 DRUG TEST PRSMV INSTRMNT: CPT

## 2020-10-20 PROCEDURE — 36416 COLLJ CAPILLARY BLOOD SPEC: CPT

## 2020-10-20 PROCEDURE — B2111ZZ FLUOROSCOPY OF MULTIPLE CORONARY ARTERIES USING LOW OSMOLAR CONTRAST: ICD-10-PCS | Performed by: INTERNAL MEDICINE

## 2020-10-20 PROCEDURE — 93010 ELECTROCARDIOGRAM REPORT: CPT

## 2020-10-20 PROCEDURE — 90732 PPSV23 VACC 2 YRS+ SUBQ/IM: CPT

## 2020-10-20 PROCEDURE — G0008 ADMIN INFLUENZA VIRUS VAC: HCPCS

## 2020-10-20 PROCEDURE — 80061 LIPID PANEL: CPT

## 2020-10-20 PROCEDURE — 94760 N-INVAS EAR/PLS OXIMETRY 1: CPT

## 2020-10-20 PROCEDURE — G0009 ADMIN PNEUMOCOCCAL VACCINE: HCPCS

## 2020-10-20 PROCEDURE — 36415 COLL VENOUS BLD VENIPUNCTURE: CPT

## 2020-10-20 PROCEDURE — 93458 L HRT ARTERY/VENTRICLE ANGIO: CPT

## 2020-10-20 PROCEDURE — 93005 ELECTROCARDIOGRAM TRACING: CPT

## 2020-10-20 PROCEDURE — 85347 COAGULATION TIME ACTIVATED: CPT

## 2020-10-20 PROCEDURE — 90662 IIV NO PRSV INCREASED AG IM: CPT

## 2020-10-20 PROCEDURE — 83880 ASSAY OF NATRIURETIC PEPTIDE: CPT

## 2020-10-20 PROCEDURE — 93798 PHYS/QHP OP CAR RHAB W/ECG: CPT

## 2020-10-20 PROCEDURE — 90471 IMMUNIZATION ADMIN: CPT

## 2020-10-20 NOTE — HP
INTERIM NOTE 



Mr. Wick underwent cardiac catheterization today, which revealed mild coronary

artery disease, but severe left ventricular dysfunction with ejection fraction 
of

15% to 20%.  He continues to remain in atrial fibrillation with fast ventricular

response.  On his last admission, he signed out against medical advice and is 
not on

any heart failure medications.  He will be admitted, started on carvedilol.  He 
does

have some degree of renal insufficiency and it is unknown at this time if he 
would

tolerate an ACE or an ARB.  He will be placed on low-dose furosemide.  He also 
will be

loaded with amiodarone and in 48 hours, started on anticoagulation.  We did 
discuss

possible transesophageal echo and electrical cardioversion in 3 days.  Risks of 
this

were discussed including death, stroke, worse heart rhythm, embolic event, etc. 
We

also discussed the need for a LifeVest and for repeat echo in 3 months and then

consideration of defibrillator if his left ventricular function does not 
improve. 







Job ID:  [id number]



MTDD

## 2020-10-20 NOTE — CON
DATE OF CONSULTATION:  10/20/2020



PRIMARY CARE PROVIDER:  Dr. Louis Brennan.



CHIEF COMPLAINT:  Management of medical comorbidities.



HISTORY OF PRESENT ILLNESS:  Mr. Wick is a pleasant 75-year-old gentleman, who was

seen at [facility] on October 20, 2020.  He was hospitalized

at this facility on September 28, 2020, for atrial fibrillation with rapid

ventricular response.  He left against medical advice. 



Earlier today, he underwent cardiac catheterization.  He was found to have mild CAD

and severe impairment of left ventricular function.  He is being admitted to the

hospital for the same.  Hospitalist Service was consulted for management of medical

comorbidities. 



Mr. Wick denies any chest pain.  He reports occasional palpitations.  He reports

chronic cough that has been going on at least since January 2020, nonproductive.  He

reports bilateral lower extremity edema.  He denies any fevers or chills.  He denies

any abdominal pain, nausea, vomiting, or diarrhea. 



REVIEW OF SYSTEMS:  All systems were reviewed and found to be negative except for

the pertinent positives mentioned above. 



PAST MEDICAL HISTORY:  Hypertension, dyslipidemia, gout, gastroesophageal reflux

disease, diabetes mellitus type 2. 



SURGICAL HISTORY:  Appendectomy.



SOCIAL HISTORY:  The patient reports smoking cigarettes every 3 or 4 days.  He also

reports using snuff.  He denies alcohol use or recreational drug use. 



FAMILY HISTORY:  Hypertension in mother and stomach cancer in father.



ALLERGIES:  NO KNOWN DRUG ALLERGIES.



HOME MEDICATIONS:  

1. Amitriptyline 50 mg at bedtime.

2. Cetirizine 10 mg as needed.

3. Aspirin 81 mg daily.

4. Fluoxetine 20 mg daily.

5. Metformin 500 mg in the evening.

6. Hydroxyzine 50 mg as needed.

7. Lipitor 10 mg at bedtime.

8. Naproxen p.r.n.

9. Amlodipine 10 mg daily.

10. Protonix 40 mg daily.

11. Risperidone 0.5 mg at bedtime.

12. Vitamin D3, 50 mcg daily.

13. Allopurinol 300 mg daily.



PHYSICAL EXAMINATION:

GENERAL:  Mr. Wick is awake and alert, not in acute distress. 

VITAL SIGNS:  Blood pressure is 129/75, pulse 112, respiratory rate 18, and oxygen

saturation 99% on room air.  He is afebrile.  He is obese, with a BMI of 37.3. 

EYES:  No scleral icterus.  No conjunctival pallor. 

ENT:  Moist mucosal membranes.  No oropharyngeal erythema or exudates. 

NECK:  Supple, nontender.  Trachea is midline. 

RESPIRATORY:  Accessory muscles of breathing are not active.  Chest wall movements

are symmetric bilaterally.  Lungs are clear to auscultation without wheeze, rhonchi,

or crepitations. 

CARDIOVASCULAR:  S1 and S2 are heard, irregular.  Peripheral pulses palpable. 

ABDOMEN:  Soft, nontender.  Bowel sounds are heard. 

NEUROLOGIC:  Cranial nerves 2 through 12 are intact. 

MUSCULOSKELETAL:  Power is 5/5 in all 4 extremities. 

SKIN:  He has bilateral lower extremity edema. 

LYMPHATIC:  No cervical lymphadenopathy. 

PSYCHIATRIC:  Normal mood.  Normal affect.  The patient is alert and oriented x3.



LABORATORY DATA AND INVESTIGATIONS:  Mr. Wick' labs and investigations were

reviewed.  Telemetry monitor shows atrial fibrillation.  Activated clotting time is

142. 



ASSESSMENT AND PLAN:  Mr. Wick is a pleasant 75-year-old gentleman, who was seen at

[facility] on October 20, 2020 for management of medical

comorbidities.  His problem list includes: 

1. Diabetes mellitus type 2:  I will start him on Accu-Cheks and insulin sliding

scale.  His creatinine was elevated at 1.31 on October 15, 2020.  This appears to

have been chronic kidney disease stage 3.  He received intravenous contrast for

angiogram.  Therefore, we will hold metformin for now. 

2. Dyslipidemia:  Continue statin.

3. Schizophrenia:  Continue amitriptyline and risperidone.  Also, continue

fluoxetine. 

4. Hypertension:  The patient has been started on Coreg, continue.  Discontinue

amlodipine because of reduced LVEF. 

5. Atrial fibrillation:  Monitor on telemetry.  The patient is hemodynamically

stable. 

Many thanks for allowing me to participate in your patient's care.  Please feel free

to contact me with any questions or concerns. 



LEVEL OF RISK:  Moderate.



LEVEL OF COMPLEXITY:  Moderate.







Job ID:  [id number]

## 2020-10-21 LAB
ANION GAP SERPL CALC-SCNC: 13 MMOL/L (ref 10–20)
BUN SERPL-MCNC: 16 MG/DL (ref 8.4–25.7)
CALCIUM SERPL-MCNC: 9.2 MG/DL (ref 7.8–10.44)
CHD RISK SERPL-RTO: 3 (ref ?–4.5)
CHLORIDE SERPL-SCNC: 107 MMOL/L (ref 98–107)
CHOLEST SERPL-MCNC: 131 MG/DL
CO2 SERPL-SCNC: 22 MMOL/L (ref 23–31)
CREAT CL PREDICTED SERPL C-G-VRATE: 72 ML/MIN (ref 70–130)
DRUG SCREEN CUTOFF: (no result)
GLUCOSE SERPL-MCNC: 115 MG/DL (ref 83–110)
HDLC SERPL-MCNC: 43 MG/DL
LDLC SERPL CALC-MCNC: 69 MG/DL
MEDTOX CONTROL LINE VALID?: (no result)
MEDTOX READER #: (no result)
POTASSIUM SERPL-SCNC: 4.6 MMOL/L (ref 3.5–5.1)
SODIUM SERPL-SCNC: 137 MMOL/L (ref 136–145)
TRIGL SERPL-MCNC: 93 MG/DL (ref ?–150)

## 2020-10-21 RX ADMIN — Medication SCH UNITS: at 08:55

## 2020-10-21 RX ADMIN — ASPIRIN SCH MG: 81 TABLET ORAL at 08:54

## 2020-10-21 NOTE — PDOC.HOSPP
- Subjective


Encounter Date: 10/21/20


Encounter Time: 08:30


Subjective: 


Patient seen for follow-up regarding diabetes mellitus type 2.  He denies any 

chest pain or shortness of breath.





- Objective


Vital Signs & Weight: 


                             Vital Signs (12 hours)











  Temp Pulse Pulse Pulse Resp BP BP


 


 10/21/20 15:08  98.5 F  81    18  


 


 10/21/20 12:32  98.6 F  90    20  


 


 10/21/20 11:32  98.5 F  86    17  


 


 10/21/20 11:26    88  97    116/78


 


 10/21/20 08:55       126/77 


 


 10/21/20 08:38       


 


 10/21/20 08:15  98.4 F  77    21 H  


 


 10/21/20 07:56  97.6 F  91    18  














  BP BP BP Pulse Ox Pulse Ox Pulse Ox


 


 10/21/20 15:08   108/66   98  


 


 10/21/20 12:32    132/75  99  


 


 10/21/20 11:32   114/71   99  


 


 10/21/20 11:26  146/94 H     98  100


 


 10/21/20 08:55      


 


 10/21/20 08:38     95  


 


 10/21/20 08:15   125/64   96  


 


 10/21/20 07:56   129/80   95  








                                     Weight











Weight                         214 lb














I&O: 


                                        











 10/20/20 10/21/20 10/22/20





 06:59 06:59 06:59


 


Intake Total  2217 


 


Output Total  1800 600


 


Balance  417 -600











Result Diagrams: 


                                 10/21/20 04:00


Additional Labs: 


                                   Accuchecks











  10/21/20 10/21/20 10/20/20





  10:37 05:30 20:11


 


POC Glucose  140 H  103 H  120 H














  10/20/20





  16:59


 


POC Glucose  106 H








Labs and MAR reviewed by me


EKG Reviewed by me: Yes (Telemetry: Atrial fibrillation)





Hospitalist ROS





- Review of Systems


Cardiovascular: denies: chest pain, palpitations, orthopnea, paroxysmal noc. 

dyspnea, edema, light headedness


Gastrointestinal: denies: nausea, vomiting, abdominal pain, diarrhea, 

constipation, melena, hematochezia





- Medication


Medications: 


Active Medications











Generic Name Dose Route Start Last Admin





  Trade Name Freq  PRN Reason Stop Dose Admin


 


Allopurinol  300 mg  10/21/20 09:00  10/21/20 08:54





  Allopurinol 300 Mg Tab  PO   300 mg





  DAILY SHIRLEY   Administration


 


Amiodarone HCl  400 mg  10/20/20 09:00  10/21/20 15:16





  Amiodarone 200 Mg Tab  PO   400 mg





  TID SHIRLEY   Administration


 


Amitriptyline HCl  10 mg  10/21/20 09:00  10/21/20 08:55





  Amitriptyline Hcl 10 Mg Tab  PO   10 mg





  DAILY SHIRLEY   Administration


 


Aspirin  81 mg  10/21/20 09:00  10/21/20 08:54





  Aspirin 81 Mg Enteric Coated Tablet  PO   81 mg





  DAILY SHIRLEY   Administration


 


Atorvastatin Calcium  10 mg  10/20/20 21:00  10/20/20 21:45





  Atorvastatin Calcium 10 Mg Tab  PO   10 mg





  HS SHIRLEY   Administration


 


Carvedilol  6.25 mg  10/21/20 08:00  10/21/20 08:55





  Carvedilol 6.25 Mg Tab  PO   6.25 mg





  BID-WM SHIRLEY   Administration


 


Cholecalciferol  2,000 units  10/21/20 09:00  10/21/20 08:55





  Cholecalciferol 1,000 Units (25 Mcg) Tab  PO   2,000 units





  DAILY SHIRLEY   Administration


 


Fluoxetine HCl  20 mg  10/21/20 09:00  10/21/20 08:54





  Fluoxetine Hcl 20 Mg Cap  PO   20 mg





  DAILY SHIRLEY   Administration


 


Furosemide  20 mg  10/20/20 09:00  10/21/20 08:55





  Furosemide 20 Mg Tab  PO   20 mg





  DAILY SHIRLEY   Administration


 


Pantoprazole Sodium  40 mg  10/21/20 09:00  10/21/20 08:55





  Pantoprazole 40 Mg Tab  PO   40 mg





  DAILY SHIRLEY   Administration


 


Risperidone  0.5 mg  10/20/20 21:00  10/20/20 21:45





  Risperidone 1 Mg Tab  PO   0.5 mg





  HS SHIRLEY   Administration














- Exam


General - other findings: Obese


Eye: anicteric sclera


ENT: moist mucosa


Neck: supple


Heart: irregular


Respiratory: CTAB


Gastrointestinal: soft, non-tender


Extremities: no edema


Skin: no rashes


Psychiatric: normal affect





Hosp A/P


(1) Type 2 diabetes mellitus


Status: Chronic   





(2) Atrial fibrillation


Code(s): I48.91 - UNSPECIFIED ATRIAL FIBRILLATION   Status: Chronic   





(3) GERD (gastroesophageal reflux disease)


Code(s): K21.9 - GASTRO-ESOPHAGEAL REFLUX DISEASE WITHOUT ESOPHAGITIS   Status: 

Chronic   





(4) Hyperlipidemia


Code(s): E78.5 - HYPERLIPIDEMIA, UNSPECIFIED   Status: Chronic   





(5) Hypertension


Code(s): I10 - ESSENTIAL (PRIMARY) HYPERTENSION   Status: Chronic   





(6) Schizophrenia


Code(s): F20.9 - SCHIZOPHRENIA, UNSPECIFIED   Status: Chronic   





- Plan





Blood sugars are well controlled.


Coreg dose increased today, monitor vital signs and titrate antihypertensives as

needed.


Metformin being resumed.


Schizophrenia stable.


Continue statin.


Rate controlled atrial fibrillation.

## 2020-10-22 LAB
ANION GAP SERPL CALC-SCNC: 12 MMOL/L (ref 10–20)
BUN SERPL-MCNC: 26 MG/DL (ref 8.4–25.7)
CALCIUM SERPL-MCNC: 9.2 MG/DL (ref 7.8–10.44)
CHLORIDE SERPL-SCNC: 107 MMOL/L (ref 98–107)
CO2 SERPL-SCNC: 21 MMOL/L (ref 23–31)
CREAT CL PREDICTED SERPL C-G-VRATE: 59 ML/MIN (ref 70–130)
GLUCOSE SERPL-MCNC: 150 MG/DL (ref 83–110)
POTASSIUM SERPL-SCNC: 4.4 MMOL/L (ref 3.5–5.1)
SODIUM SERPL-SCNC: 136 MMOL/L (ref 136–145)

## 2020-10-22 RX ADMIN — Medication SCH UNITS: at 09:09

## 2020-10-22 RX ADMIN — ASPIRIN SCH MG: 81 TABLET ORAL at 09:10

## 2020-10-22 NOTE — PDOC.HOSPP
- Subjective


Encounter Date: 10/22/20


Encounter Time: 08:20


Subjective: 


Patient seen for follow-up regarding diabetes type 2.  Denies chest pain or 

shortness of breath.





- Objective


Vital Signs & Weight: 


                             Vital Signs (12 hours)











  Temp Pulse Pulse Pulse Resp BP BP


 


 10/22/20 11:47  98.2 F  73    20  


 


 10/22/20 09:10       126/77 


 


 10/22/20 08:45    86  81    170/93 H


 


 10/22/20 07:35  97.7 F  77    20  


 


 10/22/20 03:27  98.5 F  69    16  














  BP BP Pulse Ox Pulse Ox Pulse Ox


 


 10/22/20 11:47   113/74  94 L  


 


 10/22/20 09:10     


 


 10/22/20 08:45  133/71    96  94 L


 


 10/22/20 07:35   153/75 H  97  


 


 10/22/20 03:27   109/70  100  








                                     Weight











Weight                         218 lb 8 oz














I&O: 


                                        











 10/21/20 10/22/20 10/23/20





 06:59 06:59 06:59


 


Intake Total 2217 1260 


 


Output Total 1800 950 


 


Balance 417 310 











Result Diagrams: 


                                 10/22/20 03:54


Additional Labs: 


                                   Accuchecks











  10/22/20 10/22/20 10/21/20





  11:12 06:14 20:44


 


POC Glucose  110 H  110 H  102 H














  10/21/20





  16:49


 


POC Glucose  95








I reviewed patient's labs and MAR


EKG Reviewed by me: Yes (ELANA freeman on telemetry)





Hospitalist ROS





- Review of Systems


Cardiovascular: denies: chest pain, palpitations, orthopnea, paroxysmal noc. 

dyspnea, edema, light headedness


Gastrointestinal: denies: nausea, vomiting, abdominal pain, diarrhea, 

constipation, melena, hematochezia





- Medication


Medications: 


Active Medications











Generic Name Dose Route Start Last Admin





  Trade Name Freq  PRN Reason Stop Dose Admin


 


Allopurinol  300 mg  10/21/20 09:00  10/22/20 09:10





  Allopurinol 300 Mg Tab  PO   300 mg





  DAILY SHIRLEY   Administration


 


Amiodarone HCl  400 mg  10/22/20 09:00  10/22/20 09:10





  Amiodarone 200 Mg Tab  PO   400 mg





  BID SHIRLEY   Administration


 


Amitriptyline HCl  10 mg  10/21/20 09:00  10/22/20 09:11





  Amitriptyline Hcl 10 Mg Tab  PO   10 mg





  DAILY SHIRLEY   Administration


 


Apixaban  5 mg  10/22/20 09:00  10/22/20 09:11





  Apixaban 5 Mg Tab  PO   5 mg





  BID SHIRLEY   Administration


 


Aspirin  81 mg  10/21/20 09:00  10/22/20 09:10





  Aspirin 81 Mg Enteric Coated Tablet  PO   81 mg





  DAILY SHIRLEY   Administration


 


Atorvastatin Calcium  10 mg  10/20/20 21:00  10/21/20 20:33





  Atorvastatin Calcium 10 Mg Tab  PO   10 mg





  HS SHIRLEY   Administration


 


Carvedilol  6.25 mg  10/21/20 08:00  10/22/20 09:10





  Carvedilol 6.25 Mg Tab  PO   6.25 mg





  BID-WM SHIRLEY   Administration


 


Cholecalciferol  2,000 units  10/21/20 09:00  10/22/20 09:09





  Cholecalciferol 1,000 Units (25 Mcg) Tab  PO   2,000 units





  DAILY SHIRLEY   Administration


 


Fluoxetine HCl  20 mg  10/21/20 09:00  10/22/20 09:10





  Fluoxetine Hcl 20 Mg Cap  PO   20 mg





  DAILY SHIRLEY   Administration


 


Furosemide  20 mg  10/20/20 09:00  10/22/20 09:11





  Furosemide 20 Mg Tab  PO   20 mg





  DAILY SHIRLEY   Administration


 


Metformin HCl  500 mg  10/21/20 17:00  10/21/20 17:13





  Metformin 500 Mg Tab  PO   500 mg





  QPM-WM SHIRLEY   Administration


 


Pantoprazole Sodium  40 mg  10/21/20 09:00  10/22/20 09:10





  Pantoprazole 40 Mg Tab  PO   40 mg





  DAILY SHIRLEY   Administration


 


Risperidone  0.5 mg  10/20/20 21:00  10/21/20 20:33





  Risperidone 1 Mg Tab  PO   0.5 mg





  HS SHIRLEY   Administration


 


Sacubitril/Valsartan  0.5 tab  10/22/20 09:00  10/22/20 09:11





  Sacubitril 24mg/Valsartan 26mg Tab  PO   0.5 tab





  BID SHIRLEY   Administration














- Exam


General Appearance: awake alert


General - other findings: Obese


Eye: anicteric sclera


ENT: no oropharyngeal lesions


Neck: supple


Heart: irregular


Respiratory: CTAB


Gastrointestinal: soft, non-tender


Extremities: 1+ LE edema


Skin: no rashes


Psychiatric: normal affect, normal behavior





Hosp A/P


(1) Type 2 diabetes mellitus


Status: Chronic   





(2) Atrial fibrillation


Code(s): I48.91 - UNSPECIFIED ATRIAL FIBRILLATION   Status: Chronic   





(3) GERD (gastroesophageal reflux disease)


Code(s): K21.9 - GASTRO-ESOPHAGEAL REFLUX DISEASE WITHOUT ESOPHAGITIS   Status: 

Chronic   





(4) Hyperlipidemia


Code(s): E78.5 - HYPERLIPIDEMIA, UNSPECIFIED   Status: Chronic   





(5) Hypertension


Code(s): I10 - ESSENTIAL (PRIMARY) HYPERTENSION   Status: Chronic   





(6) Schizophrenia


Code(s): F20.9 - SCHIZOPHRENIA, UNSPECIFIED   Status: Chronic   





- Plan





Diabetes mellitus controlled and stable.


Tension controlled.


Schizophrenia stable.


Continue statin.


Rate controlled atrial fibrillation.

## 2020-10-23 VITALS — DIASTOLIC BLOOD PRESSURE: 65 MMHG | SYSTOLIC BLOOD PRESSURE: 131 MMHG | TEMPERATURE: 98.2 F

## 2020-10-23 LAB
ANION GAP SERPL CALC-SCNC: 15 MMOL/L (ref 10–20)
BUN SERPL-MCNC: 28 MG/DL (ref 8.4–25.7)
CALCIUM SERPL-MCNC: 8.9 MG/DL (ref 7.8–10.44)
CHLORIDE SERPL-SCNC: 107 MMOL/L (ref 98–107)
CO2 SERPL-SCNC: 21 MMOL/L (ref 23–31)
CREAT CL PREDICTED SERPL C-G-VRATE: 54 ML/MIN (ref 70–130)
GLUCOSE SERPL-MCNC: 95 MG/DL (ref 83–110)
POTASSIUM SERPL-SCNC: 4.3 MMOL/L (ref 3.5–5.1)
SODIUM SERPL-SCNC: 139 MMOL/L (ref 136–145)

## 2020-10-23 RX ADMIN — ASPIRIN SCH MG: 81 TABLET ORAL at 09:13

## 2020-10-23 RX ADMIN — Medication SCH UNITS: at 09:13

## 2020-10-25 NOTE — EKG
Test Reason : 

Blood Pressure : ***/*** mmHG

Vent. Rate : 082 BPM     Atrial Rate : 082 BPM

   P-R Int : 210 ms          QRS Dur : 108 ms

    QT Int : 418 ms       P-R-T Axes : 048 021 070 degrees

   QTc Int : 488 ms

 

Sinus rhythm with 1st degree A-V block with occasional Premature ventricular complexes

Possible Left atrial enlargement

Prolonged QT

Abnormal ECG

When compared with ECG of 15-OCT-2020 15:33, (Unconfirmed)

Premature ventricular complexes are now Present

CT interval has increased

Confirmed by RUDY PANDEY (2) on 10/25/2020 12:01:44 PM

 

Referred By:  DANNIE           Confirmed By:RUDY PANDEY

## 2020-10-26 NOTE — DIS
DATE OF ADMISSION:  10/20/2020



DATE OF DISCHARGE:  10/23/2020



DISCHARGE DIAGNOSES:  

1. Severe nonischemic cardiomyopathy with ejection fraction of 15% to 20%.

2. Atrial fibrillation with rapid ventricular response, converted with p.o.

amiodarone (precatheterization EKG on October 15, 2020 did show sinus rhythm). 

3. Mild coronary artery disease.

4. Hypercholesterolemia.

5. Hypertension.

6. Smoker.

7. Diabetes mellitus.

8. Chronic kidney disease.

9. Schizophrenia.



DISCHARGE DISPOSITION:  The patient will be seen in 2 weeks with complete metabolic

panel and decision about continuing Entresto.  In three months, he will need to have

repeat echo. 



HOSPITAL COURSE:  Mr. Wick had been previously hospitalized, but signed out against

medical advice.  He did have an abnormal Cardiolite and it was recommended that he

undergo catheterization.  He came to the office and agreed to proceed with

catheterization.  This revealed severe global hypokinesis with ejection fraction of

15% to 20%.  There was a 10% proximal LAD, 40% mid LAD, 40% proximal circumflex.

Very large ramus was normal and a small right coronary artery was normal.  He

continued to have atrial fibrillation with rapid ventricular response with heart

rates in the 100-120s.  He was admitted for further treatment of this. 



He was started on carvedilol and loaded with p.o. amiodarone.  His amlodipine 10 mg

daily was discontinued.  After two days of loading with amiodarone, he converted

spontaneously to sinus rhythm.  He also was started on low-dose Entresto one half of

24/26 b.i.d. and his creatinine will need to be monitored since he does have mild

chronic kidney disease.  He was also started on Eliquis while in the hospital. 



Mr. Wick was fitted with a LifeVest prior to discharge.



DISCHARGE MEDICATIONS:  

1. Allopurinol 300 mg daily.

2. Amiodarone 400 mg b.i.d. x2 weeks, 200 mg b.i.d. x2 weeks, and then 200 mg daily.

3. Amitriptyline 50 mg at bedtime.

4. Eliquis 5 mg b.i.d.

5. Aspirin 81 daily.

6. Atorvastatin 10 mg daily.

7. Carvedilol 6.25 b.i.d.

8. Vitamin D3 of __________ daily.

9. Prozac 20 mg daily.

10. Furosemide 20 mg q.a.m.

11. Metformin 500 mg at bedtime.

12. Entresto 24/26 one half tablet b.i.d.







Job ID:  [id number]

## 2021-01-02 ENCOUNTER — HOSPITAL ENCOUNTER (EMERGENCY)
Dept: HOSPITAL 9 - MADERS | Age: 76
Discharge: HOME | End: 2021-01-02
Payer: SELF-PAY

## 2021-01-02 DIAGNOSIS — R00.2: Primary | ICD-10-CM

## 2021-01-02 DIAGNOSIS — I50.9: ICD-10-CM

## 2021-01-02 DIAGNOSIS — E11.9: ICD-10-CM

## 2021-01-02 DIAGNOSIS — I11.0: ICD-10-CM

## 2021-01-02 DIAGNOSIS — F17.220: ICD-10-CM

## 2021-01-02 DIAGNOSIS — I48.91: ICD-10-CM

## 2021-01-02 DIAGNOSIS — R07.2: ICD-10-CM

## 2021-01-02 LAB
ALBUMIN SERPL BCG-MCNC: 3.7 G/DL (ref 3.4–4.8)
ALP SERPL-CCNC: 77 U/L (ref 40–110)
ALT SERPL W P-5'-P-CCNC: 13 U/L (ref 8–55)
ANION GAP SERPL CALC-SCNC: 17 MMOL/L (ref 10–20)
AST SERPL-CCNC: 21 U/L (ref 5–34)
BASOPHILS # BLD AUTO: 0.1 THOU/UL (ref 0–0.2)
BASOPHILS NFR BLD AUTO: 1.3 % (ref 0–1)
BILIRUB SERPL-MCNC: 0.8 MG/DL (ref 0.2–1.2)
BUN SERPL-MCNC: 24 MG/DL (ref 8.4–25.7)
CALCIUM SERPL-MCNC: 9.3 MG/DL (ref 7.8–10.44)
CHLORIDE SERPL-SCNC: 105 MMOL/L (ref 98–107)
CK MB SERPL-MCNC: 2.6 NG/ML (ref 0–6.6)
CO2 SERPL-SCNC: 18 MMOL/L (ref 23–31)
CREAT CL PREDICTED SERPL C-G-VRATE: 0 ML/MIN (ref 70–130)
EOSINOPHIL # BLD AUTO: 0.1 THOU/UL (ref 0–0.7)
EOSINOPHIL NFR BLD AUTO: 1.2 % (ref 0–10)
GLOBULIN SER CALC-MCNC: 3.8 G/DL (ref 2.4–3.5)
GLUCOSE SERPL-MCNC: 123 MG/DL (ref 83–110)
HGB BLD-MCNC: 13.5 G/DL (ref 14–18)
LYMPHOCYTES # BLD AUTO: 2.1 THOU/UL (ref 1.2–3.4)
LYMPHOCYTES NFR BLD AUTO: 29.4 % (ref 21–51)
MCH RBC QN AUTO: 27.2 PG (ref 27–31)
MCV RBC AUTO: 86.6 FL (ref 78–98)
MONOCYTES # BLD AUTO: 0.6 THOU/UL (ref 0.11–0.59)
MONOCYTES NFR BLD AUTO: 8.9 % (ref 0–10)
NEUTROPHILS # BLD AUTO: 4.2 THOU/UL (ref 1.4–6.5)
NEUTROPHILS NFR BLD AUTO: 59.2 % (ref 42–75)
PLATELET # BLD AUTO: 317 THOU/UL (ref 130–400)
POTASSIUM SERPL-SCNC: 3.9 MMOL/L (ref 3.5–5.1)
RBC # BLD AUTO: 4.99 MILL/UL (ref 4.7–6.1)
SODIUM SERPL-SCNC: 136 MMOL/L (ref 136–145)
WBC # BLD AUTO: 7.2 THOU/UL (ref 4.8–10.8)

## 2021-01-02 PROCEDURE — 71045 X-RAY EXAM CHEST 1 VIEW: CPT

## 2021-01-02 PROCEDURE — 82553 CREATINE MB FRACTION: CPT

## 2021-01-02 PROCEDURE — 93005 ELECTROCARDIOGRAM TRACING: CPT

## 2021-01-02 PROCEDURE — 85025 COMPLETE CBC W/AUTO DIFF WBC: CPT

## 2021-01-02 PROCEDURE — 84484 ASSAY OF TROPONIN QUANT: CPT

## 2021-01-02 PROCEDURE — 80053 COMPREHEN METABOLIC PANEL: CPT

## 2021-01-02 NOTE — RAD
EXAM: 

CHEST ONE VIEW



HISTORY:

Chest pain



COMPARISON:

9/27/2020



FINDINGS:

Cardiac silhouette remains enlarged. Pulmonary vasculature is within normal limits. No consolidation 
or pleural fluid is appreciated. Degenerative changes are again seen in the spine.



IMPRESSION:



1. Cardiomegaly.

2. No acute cardiopulmonary process.



Reported By: Kemal Ritter 

Electronically Signed:  1/2/2021 8:54 PM

## 2021-01-16 ENCOUNTER — HOSPITAL ENCOUNTER (EMERGENCY)
Dept: HOSPITAL 9 - MADERS | Age: 76
Discharge: HOME | End: 2021-01-16
Payer: OTHER GOVERNMENT

## 2021-01-16 DIAGNOSIS — I50.9: ICD-10-CM

## 2021-01-16 DIAGNOSIS — F17.220: ICD-10-CM

## 2021-01-16 DIAGNOSIS — E11.9: ICD-10-CM

## 2021-01-16 DIAGNOSIS — I10: ICD-10-CM

## 2021-01-16 DIAGNOSIS — K11.7: Primary | ICD-10-CM

## 2021-01-16 PROCEDURE — 99283 EMERGENCY DEPT VISIT LOW MDM: CPT

## 2021-01-21 ENCOUNTER — HOSPITAL ENCOUNTER (EMERGENCY)
Dept: HOSPITAL 9 - MADERS | Age: 76
LOS: 1 days | Discharge: HOME | End: 2021-01-22
Payer: OTHER GOVERNMENT

## 2021-01-21 DIAGNOSIS — R41.82: ICD-10-CM

## 2021-01-21 DIAGNOSIS — N30.01: Primary | ICD-10-CM

## 2021-01-21 DIAGNOSIS — F17.210: ICD-10-CM

## 2021-01-21 DIAGNOSIS — I10: ICD-10-CM

## 2021-01-21 LAB
ALBUMIN SERPL BCG-MCNC: 3.5 G/DL (ref 3.4–4.8)
ALP SERPL-CCNC: 70 U/L (ref 40–110)
ALT SERPL W P-5'-P-CCNC: 26 U/L (ref 8–55)
ANION GAP SERPL CALC-SCNC: 16 MMOL/L (ref 10–20)
ANISOCYTOSIS BLD QL SMEAR: (no result) (100X)
AST SERPL-CCNC: 31 U/L (ref 5–34)
BILIRUB SERPL-MCNC: 1.2 MG/DL (ref 0.2–1.2)
BUN SERPL-MCNC: 27 MG/DL (ref 8.4–25.7)
CALCIUM SERPL-MCNC: 9.6 MG/DL (ref 7.8–10.44)
CHLORIDE SERPL-SCNC: 108 MMOL/L (ref 98–107)
CO2 SERPL-SCNC: 19 MMOL/L (ref 23–31)
CREAT CL PREDICTED SERPL C-G-VRATE: 0 ML/MIN (ref 70–130)
GLOBULIN SER CALC-MCNC: 3.7 G/DL (ref 2.4–3.5)
GLUCOSE SERPL-MCNC: 135 MG/DL (ref 83–110)
HGB BLD-MCNC: 12.6 G/DL (ref 14–18)
MCH RBC QN AUTO: 27 PG (ref 27–31)
MCV RBC AUTO: 84.2 FL (ref 78–98)
MDIFF COMPLETE?: YES
PLATELET # BLD AUTO: 260 THOU/UL (ref 130–400)
POTASSIUM SERPL-SCNC: 4.1 MMOL/L (ref 3.5–5.1)
RBC # BLD AUTO: 4.67 MILL/UL (ref 4.7–6.1)
SODIUM SERPL-SCNC: 139 MMOL/L (ref 136–145)
TARGETS BLD QL SMEAR: (no result) (100X)
WBC # BLD AUTO: 12.3 THOU/UL (ref 4.8–10.8)

## 2021-01-21 PROCEDURE — 80053 COMPREHEN METABOLIC PANEL: CPT

## 2021-01-21 PROCEDURE — 71045 X-RAY EXAM CHEST 1 VIEW: CPT

## 2021-01-21 PROCEDURE — 80306 DRUG TEST PRSMV INSTRMNT: CPT

## 2021-01-21 PROCEDURE — 80307 DRUG TEST PRSMV CHEM ANLYZR: CPT

## 2021-01-21 PROCEDURE — 36415 COLL VENOUS BLD VENIPUNCTURE: CPT

## 2021-01-21 PROCEDURE — 70450 CT HEAD/BRAIN W/O DYE: CPT

## 2021-01-21 PROCEDURE — 81015 MICROSCOPIC EXAM OF URINE: CPT

## 2021-01-21 PROCEDURE — 96374 THER/PROPH/DIAG INJ IV PUSH: CPT

## 2021-01-21 PROCEDURE — 85025 COMPLETE CBC W/AUTO DIFF WBC: CPT

## 2021-01-21 PROCEDURE — 81003 URINALYSIS AUTO W/O SCOPE: CPT

## 2021-01-22 LAB
BACTERIA UR QL AUTO: (no result) HPF
DRUG SCREEN CUTOFF: (no result)
MEDTOX CONTROL LINE VALID?: (no result)
MUCOUS THREADS UR QL AUTO: (no result) LPF
PROT UR STRIP.AUTO-MCNC: 100 MG/DL
RBC UR QL AUTO: (no result) HPF (ref 0–3)
SP GR UR STRIP: 1.01 (ref 1–1.03)

## 2021-01-22 NOTE — CT
CT OF BRAIN PERFORMED WITHOUT CONTRAST ENHANCEMENT:

 

Date:  01/21/2021

 

HISTORY:  

Altered mental status. 

 

COMPARISON:  

10/07/2019 exam. 

 

FINDINGS:

The ventricular and cisternal system shows fairly age-appropriate change. There has been development 
of an area of decreased attenuation within the left temporal region most compatible with an area of i
nfarct, age-indeterminate, but I would favor this more as a subacute finding. No hemorrhage. 

 

IMPRESSION: 

Area of decreased attenuation of the left temporal lobe region, left middle cerebral artery territory
, somewhat difficult to assess age. It was not present on the previous CT examination and could repre
sent an acute to subacute area of infarct, although it is somewhat well-defined and difficult to excl
ude as an older area of ischemic insult. 

 

 

POS: OFF

## 2021-01-22 NOTE — RAD
PORTABLE CHEST:

 

Date:  01/21/2021

 

HISTORY:  

Syncope. Altered mental status. 

 

COMPARISON:  

01/09/2021 exam. 

 

FINDINGS:

Heart size is enlarged. Bilateral lung infiltrates are noted. These changes have progressed, particul
trent in the left mid lung field. 

 

IMPRESSION: 

Multifocal pneumonia with worsening left-sided changes. 

 

 

POS: OFF

## 2021-01-23 ENCOUNTER — HOSPITAL ENCOUNTER (EMERGENCY)
Dept: HOSPITAL 9 - MADERS | Age: 76
Discharge: ANOTHER HOSPITAL | End: 2021-01-23
Payer: OTHER GOVERNMENT

## 2021-01-23 ENCOUNTER — HOSPITAL ENCOUNTER (INPATIENT)
Dept: HOSPITAL 92 - ERS | Age: 76
LOS: 14 days | Discharge: HOSPICE | DRG: 870 | End: 2021-02-06
Attending: INTERNAL MEDICINE | Admitting: INTERNAL MEDICINE
Payer: MEDICARE

## 2021-01-23 VITALS — BODY MASS INDEX: 32.1 KG/M2

## 2021-01-23 DIAGNOSIS — G93.6: ICD-10-CM

## 2021-01-23 DIAGNOSIS — I13.0: ICD-10-CM

## 2021-01-23 DIAGNOSIS — I25.10: ICD-10-CM

## 2021-01-23 DIAGNOSIS — E87.5: ICD-10-CM

## 2021-01-23 DIAGNOSIS — G93.49: ICD-10-CM

## 2021-01-23 DIAGNOSIS — E83.51: ICD-10-CM

## 2021-01-23 DIAGNOSIS — E87.0: ICD-10-CM

## 2021-01-23 DIAGNOSIS — R57.0: ICD-10-CM

## 2021-01-23 DIAGNOSIS — E78.5: ICD-10-CM

## 2021-01-23 DIAGNOSIS — I34.0: ICD-10-CM

## 2021-01-23 DIAGNOSIS — G93.1: ICD-10-CM

## 2021-01-23 DIAGNOSIS — E87.3: ICD-10-CM

## 2021-01-23 DIAGNOSIS — Z91.19: ICD-10-CM

## 2021-01-23 DIAGNOSIS — K21.9: ICD-10-CM

## 2021-01-23 DIAGNOSIS — I50.23: ICD-10-CM

## 2021-01-23 DIAGNOSIS — N17.0: ICD-10-CM

## 2021-01-23 DIAGNOSIS — R00.1: ICD-10-CM

## 2021-01-23 DIAGNOSIS — E87.2: ICD-10-CM

## 2021-01-23 DIAGNOSIS — J96.02: ICD-10-CM

## 2021-01-23 DIAGNOSIS — R06.03: ICD-10-CM

## 2021-01-23 DIAGNOSIS — Z51.5: ICD-10-CM

## 2021-01-23 DIAGNOSIS — I10: ICD-10-CM

## 2021-01-23 DIAGNOSIS — Z20.822: ICD-10-CM

## 2021-01-23 DIAGNOSIS — J96.01: ICD-10-CM

## 2021-01-23 DIAGNOSIS — E55.9: ICD-10-CM

## 2021-01-23 DIAGNOSIS — Z79.899: ICD-10-CM

## 2021-01-23 DIAGNOSIS — I48.20: ICD-10-CM

## 2021-01-23 DIAGNOSIS — N39.0: ICD-10-CM

## 2021-01-23 DIAGNOSIS — F17.210: ICD-10-CM

## 2021-01-23 DIAGNOSIS — I42.8: ICD-10-CM

## 2021-01-23 DIAGNOSIS — A41.9: Primary | ICD-10-CM

## 2021-01-23 DIAGNOSIS — F20.9: ICD-10-CM

## 2021-01-23 DIAGNOSIS — Z90.49: ICD-10-CM

## 2021-01-23 DIAGNOSIS — Z53.8: ICD-10-CM

## 2021-01-23 DIAGNOSIS — E88.09: ICD-10-CM

## 2021-01-23 DIAGNOSIS — Z66: ICD-10-CM

## 2021-01-23 DIAGNOSIS — F17.220: ICD-10-CM

## 2021-01-23 DIAGNOSIS — J18.9: ICD-10-CM

## 2021-01-23 DIAGNOSIS — E11.9: ICD-10-CM

## 2021-01-23 DIAGNOSIS — E11.22: ICD-10-CM

## 2021-01-23 DIAGNOSIS — M10.9: ICD-10-CM

## 2021-01-23 DIAGNOSIS — R41.82: Primary | ICD-10-CM

## 2021-01-23 DIAGNOSIS — N18.30: ICD-10-CM

## 2021-01-23 DIAGNOSIS — I35.0: ICD-10-CM

## 2021-01-23 DIAGNOSIS — F03.90: ICD-10-CM

## 2021-01-23 LAB
ALBUMIN SERPL BCG-MCNC: 3.2 G/DL (ref 3.4–4.8)
ALP SERPL-CCNC: 86 U/L (ref 40–110)
ALT SERPL W P-5'-P-CCNC: 31 U/L (ref 8–55)
ANION GAP SERPL CALC-SCNC: 18 MMOL/L (ref 10–20)
AST SERPL-CCNC: 35 U/L (ref 5–34)
BACTERIA UR QL AUTO: (no result) HPF
BASOPHILS # BLD AUTO: 0.1 THOU/UL (ref 0–0.2)
BASOPHILS NFR BLD AUTO: 0.5 % (ref 0–1)
BILIRUB SERPL-MCNC: 1.1 MG/DL (ref 0.2–1.2)
BUN SERPL-MCNC: 41 MG/DL (ref 8.4–25.7)
CALCIUM SERPL-MCNC: 9.3 MG/DL (ref 7.8–10.44)
CHLORIDE SERPL-SCNC: 104 MMOL/L (ref 98–107)
CK MB SERPL-MCNC: 1.4 NG/ML (ref 0–6.6)
CK MB SERPL-MCNC: 2.2 NG/ML (ref 0–6.6)
CO2 SERPL-SCNC: 20 MMOL/L (ref 23–31)
CREAT CL PREDICTED SERPL C-G-VRATE: 0 ML/MIN (ref 70–130)
EOSINOPHIL # BLD AUTO: 0 THOU/UL (ref 0–0.7)
EOSINOPHIL NFR BLD AUTO: 0.1 % (ref 0–10)
GLOBULIN SER CALC-MCNC: 3.8 G/DL (ref 2.4–3.5)
GLUCOSE SERPL-MCNC: 133 MG/DL (ref 83–110)
HGB BLD-MCNC: 11.7 G/DL (ref 14–18)
INR PPP: 1.4
LYMPHOCYTES # BLD AUTO: 1.2 THOU/UL (ref 1.2–3.4)
LYMPHOCYTES NFR BLD AUTO: 10.5 % (ref 21–51)
MCH RBC QN AUTO: 27.6 PG (ref 27–31)
MCV RBC AUTO: 84.1 FL (ref 78–98)
MONOCYTES # BLD AUTO: 0.7 THOU/UL (ref 0.11–0.59)
MONOCYTES NFR BLD AUTO: 6.4 % (ref 0–10)
NEUTROPHILS # BLD AUTO: 9.2 THOU/UL (ref 1.4–6.5)
NEUTROPHILS NFR BLD AUTO: 82.3 % (ref 42–75)
PLATELET # BLD AUTO: 253 THOU/UL (ref 130–400)
POTASSIUM SERPL-SCNC: 3.8 MMOL/L (ref 3.5–5.1)
PROT UR STRIP.AUTO-MCNC: 100 MG/DL
PROTHROMBIN TIME: 17.8 SEC (ref 12–14.7)
RBC # BLD AUTO: 4.25 MILL/UL (ref 4.7–6.1)
RBC UR QL AUTO: (no result) HPF (ref 0–3)
SODIUM SERPL-SCNC: 138 MMOL/L (ref 136–145)
SP GR UR STRIP: 1.01 (ref 1–1.03)
WBC # BLD AUTO: 11.1 THOU/UL (ref 4.8–10.8)
WBC UR QL AUTO: (no result) HPF (ref 0–3)

## 2021-01-23 PROCEDURE — 36416 COLLJ CAPILLARY BLOOD SPEC: CPT

## 2021-01-23 PROCEDURE — 0240U: CPT

## 2021-01-23 PROCEDURE — 80076 HEPATIC FUNCTION PANEL: CPT

## 2021-01-23 PROCEDURE — 36415 COLL VENOUS BLD VENIPUNCTURE: CPT

## 2021-01-23 PROCEDURE — 93005 ELECTROCARDIOGRAM TRACING: CPT

## 2021-01-23 PROCEDURE — 93306 TTE W/DOPPLER COMPLETE: CPT

## 2021-01-23 PROCEDURE — 82553 CREATINE MB FRACTION: CPT

## 2021-01-23 PROCEDURE — 87086 URINE CULTURE/COLONY COUNT: CPT

## 2021-01-23 PROCEDURE — 81003 URINALYSIS AUTO W/O SCOPE: CPT

## 2021-01-23 PROCEDURE — 71045 X-RAY EXAM CHEST 1 VIEW: CPT

## 2021-01-23 PROCEDURE — 84156 ASSAY OF PROTEIN URINE: CPT

## 2021-01-23 PROCEDURE — 95819 EEG AWAKE AND ASLEEP: CPT

## 2021-01-23 PROCEDURE — 85025 COMPLETE CBC W/AUTO DIFF WBC: CPT

## 2021-01-23 PROCEDURE — 84540 ASSAY OF URINE/UREA-N: CPT

## 2021-01-23 PROCEDURE — 94002 VENT MGMT INPAT INIT DAY: CPT

## 2021-01-23 PROCEDURE — 84300 ASSAY OF URINE SODIUM: CPT

## 2021-01-23 PROCEDURE — 82570 ASSAY OF URINE CREATININE: CPT

## 2021-01-23 PROCEDURE — 83735 ASSAY OF MAGNESIUM: CPT

## 2021-01-23 PROCEDURE — 86140 C-REACTIVE PROTEIN: CPT

## 2021-01-23 PROCEDURE — 81015 MICROSCOPIC EXAM OF URINE: CPT

## 2021-01-23 PROCEDURE — 83880 ASSAY OF NATRIURETIC PEPTIDE: CPT

## 2021-01-23 PROCEDURE — 86769 SARS-COV-2 COVID-19 ANTIBODY: CPT

## 2021-01-23 PROCEDURE — 80048 BASIC METABOLIC PNL TOTAL CA: CPT

## 2021-01-23 PROCEDURE — 74018 RADEX ABDOMEN 1 VIEW: CPT

## 2021-01-23 PROCEDURE — 96367 TX/PROPH/DG ADDL SEQ IV INF: CPT

## 2021-01-23 PROCEDURE — 83690 ASSAY OF LIPASE: CPT

## 2021-01-23 PROCEDURE — 80053 COMPREHEN METABOLIC PANEL: CPT

## 2021-01-23 PROCEDURE — 83605 ASSAY OF LACTIC ACID: CPT

## 2021-01-23 PROCEDURE — 95957 EEG DIGITAL ANALYSIS: CPT

## 2021-01-23 PROCEDURE — 71275 CT ANGIOGRAPHY CHEST: CPT

## 2021-01-23 PROCEDURE — 84145 PROCALCITONIN (PCT): CPT

## 2021-01-23 PROCEDURE — 70551 MRI BRAIN STEM W/O DYE: CPT

## 2021-01-23 PROCEDURE — 36600 WITHDRAWAL OF ARTERIAL BLOOD: CPT

## 2021-01-23 PROCEDURE — 82805 BLOOD GASES W/O2 SATURATION: CPT

## 2021-01-23 PROCEDURE — P9047 ALBUMIN (HUMAN), 25%, 50ML: HCPCS

## 2021-01-23 PROCEDURE — 85007 BL SMEAR W/DIFF WBC COUNT: CPT

## 2021-01-23 PROCEDURE — 87040 BLOOD CULTURE FOR BACTERIA: CPT

## 2021-01-23 PROCEDURE — 93010 ELECTROCARDIOGRAM REPORT: CPT

## 2021-01-23 PROCEDURE — 96375 TX/PRO/DX INJ NEW DRUG ADDON: CPT

## 2021-01-23 PROCEDURE — C9113 INJ PANTOPRAZOLE SODIUM, VIA: HCPCS

## 2021-01-23 PROCEDURE — 96374 THER/PROPH/DIAG INJ IV PUSH: CPT

## 2021-01-23 PROCEDURE — 85730 THROMBOPLASTIN TIME PARTIAL: CPT

## 2021-01-23 PROCEDURE — 96365 THER/PROPH/DIAG IV INF INIT: CPT

## 2021-01-23 PROCEDURE — 94760 N-INVAS EAR/PLS OXIMETRY 1: CPT

## 2021-01-23 PROCEDURE — 94003 VENT MGMT INPAT SUBQ DAY: CPT

## 2021-01-23 PROCEDURE — 85027 COMPLETE CBC AUTOMATED: CPT

## 2021-01-23 PROCEDURE — 70450 CT HEAD/BRAIN W/O DYE: CPT

## 2021-01-23 PROCEDURE — 84484 ASSAY OF TROPONIN QUANT: CPT

## 2021-01-23 PROCEDURE — 82306 VITAMIN D 25 HYDROXY: CPT

## 2021-01-23 PROCEDURE — 84100 ASSAY OF PHOSPHORUS: CPT

## 2021-01-23 PROCEDURE — 81001 URINALYSIS AUTO W/SCOPE: CPT

## 2021-01-23 PROCEDURE — 85610 PROTHROMBIN TIME: CPT

## 2021-01-23 PROCEDURE — 8E0ZXY6 ISOLATION: ICD-10-PCS | Performed by: INTERNAL MEDICINE

## 2021-01-23 PROCEDURE — 82040 ASSAY OF SERUM ALBUMIN: CPT

## 2021-01-23 PROCEDURE — 95816 EEG AWAKE AND DROWSY: CPT

## 2021-01-23 PROCEDURE — 92950 HEART/LUNG RESUSCITATION CPR: CPT

## 2021-01-23 NOTE — PDOC.HHP
Hospitalist HPI





- History of Present Illness


confusion


History of Present Illness: 


patient is a very poor historian and is not fully aware of his pmhx. 


Case of an 76y/o male with a pmhx of atrial fibrillation, dementia, nonischemic 

cardiomyopathy with an ef of 10-15%, DM, hld and ckd who presents to the em

ergency department as a transfer from [place] for altered mental status and

worsening multifocal pneumonia. patient with multiple recent admissions, one for

heart failure and second for possible covid 19/pneumonia. Patient denies any 

fever but does endorse chills, and difficulty breathing, states that he just has

not been feeling well since discharge. Patient does refer some productive cough.

Denies any nausea vomiting or diarrhea. Patient was seen yesterday at the ED for

same chief complain of altered mental status and was diagnosed with a urinary 

tract infection, started antibiotics and sent home. apparently today family 

found him more confused than yesterday complaining of dyspnea with a rr per ems 

of 40





Hospitalist ROS





- Review of Systems


All other systems reviewed; all pertinent +/- noted in HPI/Subj





Hospitalist History





- Past Medical History


Psych: reports: Schizophrenia


Rheumatologic: reports: Gout


Endocrine: reports: Diabetes





- Past Surgical History


Past Surgical History: reports: Appendectomy





- Family History


Family History: reports: no pertinent history





- Social History


Alcohol: reports: None


Drugs: reports: none





- Exam


General Appearance: NAD, awake alert


Eye: PERRL, anicteric sclera


ENT: normocephalic atraumatic, no oropharyngeal lesions


Neck: supple, symmetric, no JVD


Heart: RRR, no murmur, no gallops


Respiratory: CTAB, no wheezes, no rales


Gastrointestinal: soft, non-tender, non-distended


Extremities: no cyanosis, no clubbing, no edema


Skin: normal turgor, no lesions, no rashes


Neurological: cranial nerve grossly intact, normal sensation to touch, no 

weakness


Musculoskeletal: normal tone, normal strength, no muscle wasting


Psychiatric: normal affect, normal behavior, oriented to person, oriented to 

place





Hospitalist Results





- Labs


Lab results: 


                                        











CK-MB (CK-2)  1.4 ng/mL (0-6.6)   01/23/21  20:40    


 


Troponin I  0.267 ng/mL (< 0.028)  H  01/23/21  20:40    














Hospitalist H&P A/P





- Problem


(1) Sepsis


Code(s): A41.9 - SEPSIS, UNSPECIFIED ORGANISM   Status: Acute   





(2) Pneumonia


Code(s): J18.9 - PNEUMONIA, UNSPECIFIED ORGANISM   Status: Acute   





(3) Dementia


Code(s): F03.90 - UNSPECIFIED DEMENTIA WITHOUT BEHAVIORAL DISTURBANCE   Status: 

Acute   


Qualifiers: 


 





(4) Nonischemic cardiomyopathy


Code(s): I42.8 - OTHER CARDIOMYOPATHIES   Status: Acute   





(5) Atrial fibrillation


Code(s): I48.91 - UNSPECIFIED ATRIAL FIBRILLATION   Status: Chronic   


Qualifiers: 


 





(6) Hyperlipidemia


Code(s): E78.5 - HYPERLIPIDEMIA, UNSPECIFIED   Status: Chronic   


Qualifiers: 


 





(7) Hypertension


Code(s): I10 - ESSENTIAL (PRIMARY) HYPERTENSION   Status: Chronic   


Qualifiers: 


 





(8) Schizophrenia


Code(s): F20.9 - SCHIZOPHRENIA, UNSPECIFIED   Status: Chronic   





(9) Type 2 diabetes mellitus


Status: Chronic   





(10) Altered mental status


Code(s): R41.82 - ALTERED MENTAL STATUS, UNSPECIFIED   Status: Acute   





- Plan


Plan: 





76y/o male with the stated pmhx who present with worsening atypical pneumonia 

with sepsis





atypical pneumonia /sepsis


- multiple cts showing worsening atypical pneumonia, concerning for covid 19


- multiple negative covid 19 test


- will start cefepime + vanc


- will start isolation precautions


- id consult


- modified sepsis bundles started due to low ef 10-15%


- f/u cultures


- f/u LA





altered mental state


- unclear etiology, likely secondary to infection


- multiple visit for same chief complained, had a ct on 1/22/20 which was read 

as possible acute vs subacute infarct. this apparently was not address and 

patient was discharged w dx of uti. 


- will get head MRI to address above


- already on secondary prevention for cva with asa/xarelto, will increase statin

to high intensity


- continue neurologic work up if mri positive





atrial fibrillation


- continue rate/rythme control


- continue full AC


- cardiac monitoring





DM


acc+SS





cardiomyopathy / htn


- continue home meds when able

## 2021-01-23 NOTE — CT
CT arteriogram chest with IV contrast and 3-D imaging



HISTORY: Chest pain. Dyspnea. COVID pneumonia.



FINDINGS: There is good contrast opacification pulmonary arteries. Thoracic aorta not yet opacified.



Dense, widespread ill-defined areas of groundglass infiltrate and interstitial thickening with crazy 
paving appearance have progressed significantly since the prior exam from 1/9/2021. Small amount of

bilateral pleural fluid, right greater than left. No pneumothorax.



Left renal cyst partially visualized. Degenerative changes of the thoracic spine and right shoulder.



  



IMPRESSION :

No evidence of pulmonary embolus.



Severe CT abnormalities of multifocal COVID pneumonia.



Reported By: DARNELL Wilkes 

Electronically Signed:  1/23/2021 9:22 PM

## 2021-01-23 NOTE — RAD
Chest one view



HISTORY: Pneumonia. Follow-up.



COMPARISON: 1/21/2021.



FINDINGS: Cardiac silhouette is magnified, enlarged, and left margin partially obscured by increasing
 left upper lobe infiltrate.



Mediastinum is midline. Pulmonary vasculature upper limits of normal.



Other ill-defined patchy areas of parenchymal infiltrate throughout each lung are slightly more dense
.



No evidence of pneumothorax.



  



IMPRESSION :

Radiographic progression of multifocal infiltrates.



Reported By: DARNELL Wilkes 

Electronically Signed:  1/23/2021 4:30 PM

## 2021-01-24 LAB
ALBUMIN SERPL BCG-MCNC: 3 G/DL (ref 3.4–4.8)
ALP SERPL-CCNC: 137 U/L (ref 40–110)
ALT SERPL W P-5'-P-CCNC: 52 U/L (ref 8–55)
ANION GAP SERPL CALC-SCNC: 15 MMOL/L (ref 10–20)
AST SERPL-CCNC: 63 U/L (ref 5–34)
BILIRUB SERPL-MCNC: 0.9 MG/DL (ref 0.2–1.2)
BUN SERPL-MCNC: 41 MG/DL (ref 8.4–25.7)
CALCIUM SERPL-MCNC: 8.6 MG/DL (ref 7.8–10.44)
CHLORIDE SERPL-SCNC: 108 MMOL/L (ref 98–107)
CO2 SERPL-SCNC: 16 MMOL/L (ref 23–31)
CREAT CL PREDICTED SERPL C-G-VRATE: 52 ML/MIN (ref 70–130)
GLOBULIN SER CALC-MCNC: 3.7 G/DL (ref 2.4–3.5)
GLUCOSE SERPL-MCNC: 130 MG/DL (ref 83–110)
HGB BLD-MCNC: 10.9 G/DL (ref 14–18)
MCH RBC QN AUTO: 27.8 PG (ref 27–31)
MCV RBC AUTO: 85 FL (ref 78–98)
MDIFF COMPLETE?: YES
PLATELET # BLD AUTO: 269 THOU/UL (ref 130–400)
POTASSIUM SERPL-SCNC: 4.1 MMOL/L (ref 3.5–5.1)
RBC # BLD AUTO: 3.9 MILL/UL (ref 4.7–6.1)
SODIUM SERPL-SCNC: 135 MMOL/L (ref 136–145)
TROPONIN I SERPL DL<=0.01 NG/ML-MCNC: 0.24 NG/ML (ref ?–0.03)
TROPONIN I SERPL DL<=0.01 NG/ML-MCNC: 0.29 NG/ML (ref ?–0.03)
WBC # BLD AUTO: 10.6 THOU/UL (ref 4.8–10.8)

## 2021-01-24 RX ADMIN — Medication SCH ML: at 15:37

## 2021-01-24 RX ADMIN — Medication SCH ML: at 08:51

## 2021-01-24 RX ADMIN — Medication PRN ML: at 17:44

## 2021-01-24 RX ADMIN — Medication SCH ML: at 20:33

## 2021-01-24 RX ADMIN — ASPIRIN SCH MG: 81 TABLET ORAL at 08:50

## 2021-01-24 NOTE — PDOC.HOSPP
- Subjective


Encounter Date: 01/24/21


Encounter Time: 11:00


Subjective: 


feels better, is not oriented but not in distress


no pain or palp





- Objective


Vital Signs & Weight: 


                             Vital Signs (12 hours)











  Temp Pulse Resp BP Pulse Ox


 


 01/24/21 11:56  96 F L  89  32 H  127/80  95


 


 01/24/21 08:20  97.4 F L  87  16  135/82  94 L


 


 01/24/21 03:35  98.2 F  86  32 H  141/95 H  97








                                     Weight











Weight                         195 lb 5 oz














I&O: 


                                        











 01/23/21 01/24/21 01/25/21





 06:59 06:59 06:59


 


Intake Total  880 240


 


Output Total   375


 


Balance  880 -135











Result Diagrams: 


                                 01/24/21 05:33





                                 01/24/21 05:33





Hospitalist ROS





- Medication


Medications: 


Active Medications











Generic Name Dose Route Start Last Admin





  Trade Name Freq  PRN Reason Stop Dose Admin


 


Allopurinol  300 mg  01/24/21 09:00  01/24/21 08:50





  Allopurinol 300 Mg Tab  PO   300 mg





  DAILY SHIRLEY   Administration


 


Amiodarone HCl  400 mg  01/24/21 09:00  01/24/21 08:50





  Amiodarone 200 Mg Tab  PO   400 mg





  BID SHIRLEY   Administration


 


Apixaban  5 mg  01/24/21 09:00  01/24/21 08:50





  Apixaban 5 Mg Tab  PO   5 mg





  BID SHIRLEY   Administration


 


Aspirin  81 mg  01/24/21 09:00  01/24/21 08:50





  Aspirin 81 Mg Enteric Coated Tablet  PO   81 mg





  DAILY SHIRLEY   Administration


 


Carvedilol  6.25 mg  01/24/21 08:00  01/24/21 08:50





  Carvedilol 6.25 Mg Tab  PO   6.25 mg





  BID-WM SHIRLEY   Administration


 


Cefepime HCl 2 gm/ Sodium  100 mls @ 200 mls/hr  01/24/21 06:00  01/24/21 06:07





  Chloride  IVPB   100 mls





  0600,1800 SHIRLEY   Administration


 


Sacubitril/Valsartan  0.5 tab  01/24/21 09:00  01/24/21 08:50





  Sacubitril 24mg/Valsartan 26mg Tab  PO   0.5 tab





  BID SHIRLEY   Administration


 


Sodium Chloride  10 ml  01/24/21 09:00  01/24/21 08:51





  Flush - Normal Saline 10 Ml Syringe  IVF   10 ml





  Q12HR SHIRLEY   Administration














- Exam


General Appearance: awake alert


Eye: PERRL, anicteric sclera


ENT: no oropharyngeal lesions, moist mucosa


Neck: supple, JVD


Heart: RRR, no murmur


Respiratory: no wheezes, rales, rhonchi


Gastrointestinal: soft, non-tender, non-distended, normal bowel sounds


Extremities: no cyanosis, no edema


Neurological: cranial nerve grossly intact, no focal deficits





Hosp A/P


(1) Acute on chronic systolic (congestive) heart failure


Code(s): I50.23 - ACUTE ON CHRONIC SYSTOLIC (CONGESTIVE) HEART FAILURE   Status:

Acute   





(2) Pneumonia


Code(s): J18.9 - PNEUMONIA, UNSPECIFIED ORGANISM   Status: Suspected   


Qualifiers: 


   Pneumonia type: due to unspecified organism 





(3) Nonischemic cardiomyopathy


Code(s): I42.8 - OTHER CARDIOMYOPATHIES   Status: Chronic   





(4) Atrial fibrillation


Code(s): I48.91 - UNSPECIFIED ATRIAL FIBRILLATION   Status: Chronic   


Qualifiers: 


   Atrial fibrillation type: paroxysmal 





(5) GERD (gastroesophageal reflux disease)


Code(s): K21.9 - GASTRO-ESOPHAGEAL REFLUX DISEASE WITHOUT ESOPHAGITIS   Status: 

Chronic   


Qualifiers: 


   Esophagitis presence: without esophagitis   Qualified Code(s): K21.9 - Ga

stro-esophageal reflux disease without esophagitis   





(6) Hyperlipidemia


Code(s): E78.5 - HYPERLIPIDEMIA, UNSPECIFIED   Status: Chronic   


Qualifiers: 


 





(7) Hypertension


Code(s): I10 - ESSENTIAL (PRIMARY) HYPERTENSION   Status: Chronic   


Qualifiers: 


 





(8) Schizophrenia


Code(s): F20.9 - SCHIZOPHRENIA, UNSPECIFIED   Status: Chronic   


Qualifiers: 


   Schizophrenia type: unspecified   Qualified Code(s): F20.9 - Schizophrenia, 

unspecified   





(9) Type 2 diabetes mellitus


Status: Chronic   


Qualifiers: 


   Diabetes mellitus long term insulin use: without long term use 





- Plan





start lasix at 6am and 2pm, has prior ef of 15%, likely is noncomplaint?


has nearly 6 covid pcr testing done all of which have been -ve.


continue amiodarone, coreg, eliquis, asp, lipitor, entresto and cefepime


has chronic lung changes with current volume overload


is not oriented with underlying psychiatric disorder, please obtain home med 

list for it


hemostable, mobilize as tolerated, encourage oral intake, likely will need home 

O2 await diuresis to see if he gets better with exertional spo2 levels.

## 2021-01-24 NOTE — CON
DATE OF CONSULTATION:  01/24/2021



REASON:  Atypical pneumonia.



HISTORY OF PRESENT ILLNESS:  A 75-year-old who has a history of type 2 diabetes,

hypertension, gout, and schizophrenia and chronic smoking, who was initially

admitted in September with acute onset of atrial fibrillation, congestive heart

failure.  The patient had his stress test, which showed global hypokinesis and a

fixed myocardial perfusion defect, anterior septal wall, and EF of 23%, 
confirmed

with an echocardiogram.  He was scheduled for heart catheterization but left 
against

medical advice, next admission was in October.  He was again diagnosed with 
severe

nonischemic cardiomyopathy with EF 15-20%, atrial fibrillation.  He was treated 
with

amiodarone.  He did have this time coronary angiogram, which showed 
mild-to-moderate

coronary disease.  He was supposed to be fitted with a LifeVest, but that 
somehow

never happened.  After that, then he had multiple evaluations in the emergency 
room

starting on January 2nd, basically he was having palpitations, coughing, but not

much, and had some chest pain, which had resolved by the time he arrived in the

emergency room.  So, he was felt to be asymptomatic and he was discharged home. 
He

came in again on January 5th with decreased appetite, one episode of vomiting, 
and

also a change in his sense of smell.  This progressively developed worsening

coughing and shortness of breath, ended up admitted eventually yesterday, and he
had

a CT angio, which showed diffuse ground-glass opacities consistent with COVID-
19,

although he has had multiple negative RT-PCR tests.  In the phase of all the 
other

findings, it is very likely that this represents COVID-19.  So, the patient

currently is somewhat delusional, confused.  He is awake, and he denies any

headaches.  He is not coughing much, a little bit of tachypnea.  No abdominal 
pain

or chest pain.  No genitourinary symptoms.  No joint symptoms.  Seems like there
is

some element of hallucinations going on, either auditory or visual or both, does
not

know where he is. 



PAST MEDICAL HISTORY:  Includes schizophrenia, nonischemic cardiomyopathy, 
atrial

fibrillation, CHF with EF in the 15% range, type 2 diabetes, CKD, gout. 



SURGICAL HISTORY:  Appendectomy.



FAMILY HISTORY:  Not pertinent.



SOCIAL HISTORY:  No alcoholic beverage use.  Former smoker.  No drug use.



ALLERGIES:  NO KNOWN DRUG ALLERGIES.



CURRENT MEDICATIONS:  

1. Allopurinol.

2. Amiodarone.

3. Eliquis.

4. Cefepime.

5. Entresto.



PHYSICAL EXAMINATION:

VITAL SIGNS:  The patient has been afebrile since admission.  /71, heart 
rate

73, respiratory rate 32, saturating 98 on 2 L nasal cannula, actually he was not

wearing properly the device when I was examining him, had to reposition it. 

SKIN:  Shows peripheral IV access.  He is voiding in the urinal.  No

lymphadenopathy. 

HEENT:  Ocular movements are conjugate.  Sclerae white.  Oral cavity normal.  
Some

jugular vein distention. 

LUNGS:  With inspiratory crackles mostly on the left base but also on the right.
 No

wheezing. 

HEART:  S1, S2.  Regular rate without murmurs. 

ABDOMEN:  Soft, not distended or tender.  No ascites. 

GENITOURINARY:  No bladder distention. 

EXTREMITIES:  No edema.  Pulses 1+ in dorsalis pedis.  Moves extremities 
equally.

He is awake, knows his name, but could not tell me where he was and had some

delusional thinking process. 



LABORATORY DATA:  White cell count is 10.6, hemoglobin 10.9, platelets 269 with 
84%

neutrophils.  INR 1.4.  Creatinine 1.52.  His prior creatinines peaked at 2.28, 
so

this is actually little bit better than before.  AST 63, ALT 52, alkaline

phosphatase 137, albumin 3.0, BNP was 3100.  So thus far, he has had 4 
SARS-CoV-2

negative, RT-PCR tests starting on January 6, then January 9, 10th, and 23rd, 
and he

has had one SARS-CoV antibody test negative on January 10th.  He had a brain MRI

done today, which showed no evidence of acute infarction or hemorrhage noted. 



ASSESSMENT:  Schizophrenia, nonischemic cardiomyopathy, type 2 diabetes, chronic

kidney disease, and change in sense of smell with anorexia, diffuse bilateral

ground-glass opacities, and multiple negative SARS-CoV RT-PCR tests. 



DISCUSSION:  Despite negative SARS-CoV-2 RT-PCR tests, the diagnosis of COVID-19
is

very likely.  The presence of anosmia in the setting of diffuse ground-glass

opacities and imaging studies of the lungs are highly specific for COVID-19 in 
the

face of the current pandemic.  So, I am going to go ahead and repeat the 
antibody

test.  I think he has had it all along from the beginning of the month, so he is
not

eligible for remdesivir, and he is at very high risk for further deterioration, 
so I

am going to go ahead and get him started on Decadron, and I do not think any 
other

intervention would be applicable at this time.  Discontinue antimicrobial 
therapy. 







Job ID:  [id number]



MTDD

## 2021-01-24 NOTE — MRI
MRI of thebrain:

1/24/2021



COMPARISON:None available



HISTORY:Generalized weakness, severe pneumonia



TECHNIQUE: Multiplanar multisequence MR imaging of thebrain without contrast



Findings:The visualized paranasal sinuses and mastoid air cells appear unremarkable. This study is si
gnificantly limited by head motion artifact. No evidence for acute infarction or intracranial

hemorrhage. No midline shift or mass effect. No ventricular enlargement.



IMPRESSION:Motion limited exam demonstrating no evidence for acute infarction or intracranial hemorrh
age.



Reported By: Ubaldo Linares 

Electronically Signed:  1/24/2021 11:06 AM

## 2021-01-25 LAB
ANION GAP SERPL CALC-SCNC: 13 MMOL/L (ref 10–20)
BASOPHILS # BLD AUTO: 0 THOU/UL (ref 0–0.2)
BASOPHILS NFR BLD AUTO: 0.1 % (ref 0–1)
BUN SERPL-MCNC: 53 MG/DL (ref 8.4–25.7)
CALCIUM SERPL-MCNC: 9 MG/DL (ref 7.8–10.44)
CHLORIDE SERPL-SCNC: 109 MMOL/L (ref 98–107)
CO2 SERPL-SCNC: 23 MMOL/L (ref 23–31)
CREAT CL PREDICTED SERPL C-G-VRATE: 41 ML/MIN (ref 70–130)
EOSINOPHIL # BLD AUTO: 0 THOU/UL (ref 0–0.7)
EOSINOPHIL NFR BLD AUTO: 0.1 % (ref 0–10)
GLUCOSE SERPL-MCNC: 143 MG/DL (ref 83–110)
HGB BLD-MCNC: 11 G/DL (ref 14–18)
LYMPHOCYTES # BLD: 1.2 THOU/UL (ref 1.2–3.4)
LYMPHOCYTES NFR BLD AUTO: 8.2 % (ref 21–51)
MCH RBC QN AUTO: 28.1 PG (ref 27–31)
MCV RBC AUTO: 86.2 FL (ref 78–98)
MONOCYTES # BLD AUTO: 0.8 THOU/UL (ref 0.11–0.59)
MONOCYTES NFR BLD AUTO: 5.7 % (ref 0–10)
NEUTROPHILS # BLD AUTO: 12.4 THOU/UL (ref 1.4–6.5)
NEUTROPHILS NFR BLD AUTO: 86 % (ref 42–75)
PLATELET # BLD AUTO: 310 THOU/UL (ref 130–400)
POTASSIUM SERPL-SCNC: 4.3 MMOL/L (ref 3.5–5.1)
RBC # BLD AUTO: 3.92 MILL/UL (ref 4.7–6.1)
SARS-COV-2 IGG SERPL IA-ACNC: 0.03 S/CO (ref ?–1.4)
SODIUM SERPL-SCNC: 141 MMOL/L (ref 136–145)
WBC # BLD AUTO: 14.4 THOU/UL (ref 4.8–10.8)

## 2021-01-25 RX ADMIN — Medication SCH ML: at 08:19

## 2021-01-25 RX ADMIN — ASPIRIN SCH MG: 81 TABLET ORAL at 08:18

## 2021-01-25 RX ADMIN — Medication SCH ML: at 19:39

## 2021-01-25 NOTE — PRG
DATE OF SERVICE:  01/25/2021



SUBJECTIVE:  Mr. Wick appears better than when I last saw him.  He is quite, said

he was relaxing.  He ate well breakfast and lunch.  No pain at this point.  No

abdominal pain.  Specifically, no genitourinary symptoms. 



OBJECTIVE:  VITAL SIGNS:  He is afebrile.  /70, heart rate 68, breathing at 24

times a minute, saturating 100 at 4 L when I saw him. 

LUNGS:  With symmetric air entry.  Few crackles scattered sparsely over the lung

fields. 

HEART:  S1, S2.  Regular rate. 

ABDOMEN:  Soft, not distended.



LABORATORY DATA:  White cell count 10.6.  SARS-CoV-2 RT-PCR from January 23 not

detected, but the antibody is pending at this time, was submitted this morning. 



ASSESSMENT AND DISCUSSION:  Schizophrenia, nonischemic cardiomyopathy, type 2

diabetes, chronic kidney disease, change in sense of smell, anorexia, diffuse

bilateral ground-glass opacities with multiple negative SARS-CoV-2 RT-PCR tests.

So, antibodies pending.  We could try to submit a saliva test.  Repeat test for

SARS-CoV-2 I think it is a new modality of testing, introduced here in the hospital.

 I think we should take advantage of it.  In the meantime, continue Decadron. 







Job ID:  [id number]

## 2021-01-25 NOTE — CON
DATE OF CONSULTATION:  



HISTORY OF PRESENT ILLNESS:  Louie Wick is a 75-year-old black male initially

evaluated in September 2020.  He presented to [facility] with chest

discomfort and atrial fibrillation.  For 2 weeks prior to that, he would have

episodes of chest pressure, some of which would last for 1 or 2 days 
continuously.

He also had shortness of breath and palpitations.  He underwent adenosine 
Cardiolite

testing, which revealed a fixed anteroseptal and posterior inferior wall 
defects.

There was no reversible ischemia.  Ejection fraction was 23% with global

hypokinesis.  It was recommended that he undergo cardiac catheterization.  
However,

he signed out AMA without further evaluation. 



He ultimately called the office a week or two later and stated that he wanted

catheterization.  This was then performed on October 20, 2020.  He had severe 
global

hypokinesis with ejection fraction of 15% to 20%.  There was a 10% proximal LAD,
40%

mid LAD, 40% proximal circumflex.  The right coronary artery was small and 
normal.

It is of note that his preop EKG showed that he was back in sinus rhythm.  
However,

when he presented for catheterization, he was in atrial fibrillation with fast

ventricular response.  It was felt that he needed to be admitted to control his 
rate

and address the atrial fibrillation.  He was started on carvedilol and loaded 
with

p.o. amiodarone.  After 2 days loading with amiodarone, he converted 
spontaneously

to sinus rhythm.  He was also started on low-dose Entresto.  Eliquis was also

started while in the hospital.  Mr. Wick was fitted for a LifeVest prior to

discharge.  It was felt that he could not properly operate the vest and was not 
sent

home with one.  He was to return 2 weeks later for monitoring his potassium and

kidney function on the Entresto; however he has never returned.  Apparently he 
is still on

amiodarone 400 mg b.i.d.  He was sent home with a tapering dose. 



He now is transferred from [place] [facility] for altered mental status and 
multifocal

pulmonary infiltrates.  He also has lost his sense of taste and smell.  COVID 
swab

as well as antibodies have been negative; however it was felt by Infectious 
Disease

that he has COVID given this clinical spectrum.  Mr. Wick does complain of some

shortness of breath, but denies any chest discomfort; however, with his 
confusion,

it is hard to get any accurate history from him. 



PAST MEDICAL HISTORY:  Severe nonischemic cardiomyopathy with ejection fraction 
of

15% to 20%, paroxysmal atrial fibrillation converting with p.o. amiodarone 
during

last admission, diabetes, hypercholesterolemia, schizophrenia, gout, GERD. 



OPERATIONS:  Appendectomy.



MEDICATIONS:  

1. Allopurinol 300 mg daily.

2. Amiodarone 400 mg b.i.d.

3. Amitriptyline 50 at bedtime.

4. Eliquis 5 mg b.i.d.

5. Aspirin 81 daily. 

6. Atorvastatin 40 mg at bedtime.

7. Carvedilol 6.25 b.i.d.

8. Fluoxetine 20 mg daily.

9. Furosemide 20 mg daily.

10. Metformin 500 mg q.p.m. 

11. Entresto 24/26 one half tablet b.i.d.



ALLERGIES:  NONE.



SOCIAL HISTORY:  Smokes five cigarettes per day and also dips snuff.  He does

not drink alcohol. 



REVIEW OF SYSTEMS:  Ten point review of system is difficult to obtain with the

patient's current mentation. 



PHYSICAL EXAMINATION:

VITAL SIGNS:  Blood pressure 118/87, pulse 71. 

HEENT:  PERRL. 

NECK:  Supple. 

CHEST:  Clear. 

CARDIAC:  S1 and S2 normal without any S3, S4, or murmurs. 

ABDOMEN:  Normal bowel sounds without tenderness. 

EXTREMITIES:  Revealed no clubbing, cyanosis, or edema. 

NEUROLOGIC:  Grossly intact. 

SKIN:  Warm and dry.



LABORATORY DATA:  EKGs reveals normal sinus rhythm with probable left atrial

enlargement, poor R-wave progression.  Hemoglobin 11.0, hematocrit 33.8, white 
count

46881, platelets 310,000.  PT 17.8, INR 1.4.  Sodium 135, potassium 4.1, 
chloride

108 carbon dioxide 26, BUN 41, creatinine 1.52.  Troponin I is 0.293.  He has

chronically elevated troponin I's. 



IMPRESSION:  

1. Chest x-ray and symptoms compatible with COVID-19; however his tests thus far

have been negative. 

2. Minimal coronary artery disease on catheterization in October 2020.

3. Noncompliance with office followups-he was supposed to return two weeks after
his

hospitalization in October; however, I have not seen him since. 

4. Paroxysmal atrial fibrillation, converting with p.o. amiodarone.

5. Severe nonischemic cardiomyopathy with ejection fraction of 15% to 20%.

6. Possible noncompliance with medicines.  I am uncertain as if he is truly 
taking

his medications.  Also at the present time, he should only be on amiodarone 200 
mg

daily instead of 400 mg b.i.d. 

7. Hypertension.

8. Diabetes.

9. Hypercholesterolemia.

10. Smoker.

11. Schizophrenia.

12. Chronic kidney disease.



PLAN:  Amiodarone will be reduced to 200 mg daily.  Some thought needs to be

given to these possible infiltrates being related to amiodarone alone.  His 

significantly elevated BUN and renal function needs to be watched closely-he may
be

somewhat over diuresed.  Also when his current problems have improved, he needs 
to

undergo echocardiography to reassess left ventricular function and he certainly 
may need a

defibrillator.  He had difficulty operating the LifeVest and was not sent home 
with one 

by the company representative. 





Job ID:  [id number]



MTDD

## 2021-01-25 NOTE — PDOC.HOSPP
- Subjective


Encounter Date: 01/25/21


Encounter Time: 11:30


Subjective: 


lethargic a bit this am, no complaints, he says he is feeling better


not fully oriented and likely his baseline? with h/o schizophrenia





- Objective


Vital Signs & Weight: 


                             Vital Signs (12 hours)











  Temp Pulse Resp BP Pulse Ox


 


 01/25/21 12:00  97.8 F  68  24 H  128/70  98


 


 01/25/21 08:42  97.0 F L  91  24 H  124/72  97


 


 01/25/21 05:08      99


 


 01/25/21 03:55  97.7 F  75  40 H  119/69  93 L








                                     Weight











Admit Weight                   195 lb 3.408 oz


 


Weight                         185 lb 6 oz














I&O: 


                                        











 01/24/21 01/25/21 01/26/21





 06:59 06:59 06:59


 


Intake Total 880 740 


 


Output Total  1325 


 


Balance 880 -585 











Result Diagrams: 


                                 01/24/21 05:33





                                 01/24/21 05:33





Hospitalist ROS





- Medication


Medications: 


Active Medications











Generic Name Dose Route Start Last Admin





  Trade Name Ramanq  PRN Reason Stop Dose Admin


 


Allopurinol  300 mg  01/24/21 09:00  01/25/21 08:18





  Allopurinol 300 Mg Tab  PO   300 mg





  DAILY SHIRLEY   Administration


 


Amiodarone HCl  400 mg  01/24/21 09:00  01/25/21 08:18





  Amiodarone 200 Mg Tab  PO   400 mg





  BID SHIRLEY   Administration


 


Amitriptyline HCl  50 mg  01/24/21 21:00  01/24/21 20:32





  Amitriptyline Hcl 25 Mg Tab  PO   50 mg





  HS SHIRLEY   Administration


 


Apixaban  5 mg  01/24/21 09:00  01/25/21 08:18





  Apixaban 5 Mg Tab  PO   5 mg





  BID SHIRLEY   Administration


 


Aspirin  81 mg  01/24/21 09:00  01/25/21 08:18





  Aspirin 81 Mg Enteric Coated Tablet  PO   81 mg





  DAILY SHIRLEY   Administration


 


Atorvastatin Calcium  40 mg  01/24/21 21:00  01/24/21 20:31





  Atorvastatin Calcium 40 Mg Tab  PO   40 mg





  HS SHIRLEY   Administration


 


Carvedilol  6.25 mg  01/24/21 08:00  01/25/21 08:18





  Carvedilol 6.25 Mg Tab  PO   6.25 mg





  BID-WM SHIRLEY   Administration


 


Dexamethasone  6 mg  01/25/21 09:00  01/25/21 08:18





  Dexamethasone 4 Mg/Ml Vial  IVPB   6 mg





  DAILY SHIRLEY   Administration


 


Furosemide  40 mg  01/25/21 06:00  01/25/21 12:43





  Furosemide 40 Mg/4 Ml Vial  SLOW IVP   40 mg





  0600,1400 SHIRLEY   Administration


 


Sacubitril/Valsartan  0.5 tab  01/24/21 09:00  01/25/21 09:51





  Sacubitril 24mg/Valsartan 26mg Tab  PO   0.5 tab





  BID SHIRLEY   Administration


 


Sodium Chloride  10 ml  01/24/21 09:00  01/25/21 08:19





  Flush - Normal Saline 10 Ml Syringe  IVF   10 ml





  Q12HR SHIRLEY   Administration


 


Sodium Chloride  10 ml  01/24/21 02:30  01/24/21 17:44





  Flush - Normal Saline 10 Ml Syringe  IVF   10 ml





  PRN PRN   Administration





  Saline Flush  














- Exam


General Appearance: ill appearing


Eye: PERRL, anicteric sclera


ENT: no oropharyngeal lesions, moist mucosa


Neck: supple, no JVD


Heart: RRR, no murmur


Respiratory: no wheezes, rales, rhonchi


Gastrointestinal: soft, non-tender, non-distended, normal bowel sounds


Extremities: no cyanosis, no edema


Neurological: cranial nerve grossly intact, no focal deficits





Hosp A/P


(1) Acute on chronic systolic (congestive) heart failure


Code(s): I50.23 - ACUTE ON CHRONIC SYSTOLIC (CONGESTIVE) HEART FAILURE   Status:

Acute   





(2) Pneumonia


Code(s): J18.9 - PNEUMONIA, UNSPECIFIED ORGANISM   Status: Suspected   


Qualifiers: 


   Pneumonia type: due to unspecified organism 





(3) Nonischemic cardiomyopathy


Code(s): I42.8 - OTHER CARDIOMYOPATHIES   Status: Chronic   





(4) Atrial fibrillation


Code(s): I48.91 - UNSPECIFIED ATRIAL FIBRILLATION   Status: Chronic   


Qualifiers: 


   Atrial fibrillation type: paroxysmal 





(5) GERD (gastroesophageal reflux disease)


Code(s): K21.9 - GASTRO-ESOPHAGEAL REFLUX DISEASE WITHOUT ESOPHAGITIS   Status: 

Chronic   


Qualifiers: 


   Esophagitis presence: without esophagitis   Qualified Code(s): K21.9 - 

Gastro-esophageal reflux disease without esophagitis   





(6) Hyperlipidemia


Code(s): E78.5 - HYPERLIPIDEMIA, UNSPECIFIED   Status: Chronic   


Qualifiers: 


 





(7) Hypertension


Code(s): I10 - ESSENTIAL (PRIMARY) HYPERTENSION   Status: Chronic   


Qualifiers: 


 





(8) Schizophrenia


Code(s): F20.9 - SCHIZOPHRENIA, UNSPECIFIED   Status: Chronic   


Qualifiers: 


   Schizophrenia type: unspecified   Qualified Code(s): F20.9 - Schizophrenia, 

unspecified   





(9) Type 2 diabetes mellitus


Status: Chronic   


Qualifiers: 


   Diabetes mellitus long term insulin use: without long term use 





- Plan





on lasix at 6am and 2pm, has prior ef of 15%, likely is noncomplaint?


has nearly 6 covid pcr testing done all of which have been -ve, is presumed to 

have had covid with pulm infiltrates/fibrosis, on dexamethasone.


continue amiodarone, coreg, eliquis, asp, lipitor, entresto.


has chronic lung changes with current volume overload


is not oriented with underlying psychiatric disorder, please obtain home med 

list for it


hemostable, mobilize as tolerated, encourage oral intake, likely will need home 

O2 await diuresis to see if he gets better with exertional spo2 levels.


staff to try and obtain psych med list from [facility] if they can help us out.

## 2021-01-26 LAB
ANION GAP SERPL CALC-SCNC: 15 MMOL/L (ref 10–20)
BUN SERPL-MCNC: 54 MG/DL (ref 8.4–25.7)
CALCIUM SERPL-MCNC: 9 MG/DL (ref 7.8–10.44)
CHLORIDE SERPL-SCNC: 107 MMOL/L (ref 98–107)
CO2 SERPL-SCNC: 22 MMOL/L (ref 23–31)
CREAT CL PREDICTED SERPL C-G-VRATE: 39 ML/MIN (ref 70–130)
GLUCOSE SERPL-MCNC: 115 MG/DL (ref 83–110)
POTASSIUM SERPL-SCNC: 4.2 MMOL/L (ref 3.5–5.1)
SODIUM SERPL-SCNC: 140 MMOL/L (ref 136–145)

## 2021-01-26 RX ADMIN — Medication SCH ML: at 21:31

## 2021-01-26 RX ADMIN — ASPIRIN SCH MG: 81 TABLET ORAL at 08:04

## 2021-01-26 RX ADMIN — Medication SCH ML: at 08:08

## 2021-01-26 NOTE — PDOC.HOSPP
- Subjective


Encounter Date: 01/26/21


Encounter Time: 10:15


Subjective: 


has mild resp distress with grunting with each breath, had pulled out his O2 

canula inadvertently this am


no chest pain or palp





- Objective


Vital Signs & Weight: 


                             Vital Signs (12 hours)











  Temp Pulse Resp BP Pulse Ox


 


 01/26/21 12:18  96.2 F L  72  32 H  138/83  95


 


 01/26/21 11:22  96.5 F L  76  24 H  139/77 


 


 01/26/21 08:21  96.7 F L  70  24 H  137/75  94 L


 


 01/26/21 04:00  97.5 F L  74  24 H  116/64  93 L








                                     Weight











Admit Weight                   195 lb 3.408 oz


 


Weight                         185 lb 14.4 oz














I&O: 


                                        











 01/25/21 01/26/21 01/27/21





 06:59 06:59 06:59


 


Intake Total 740 840 


 


Output Total 1325 1100 


 


Balance -585 -213 











Result Diagrams: 


                                 01/25/21 15:10





                                 01/26/21 04:30





Hospitalist ROS





- Medication


Medications: 


Active Medications











Generic Name Dose Route Start Last Admin





  Trade Name Ramanq  PRN Reason Stop Dose Admin


 


Allopurinol  300 mg  01/24/21 09:00  01/26/21 08:04





  Allopurinol 300 Mg Tab  PO   300 mg





  DAILY SHIRLEY   Administration


 


Amiodarone HCl  200 mg  01/26/21 09:00  01/26/21 08:03





  Amiodarone 200 Mg Tab  PO   200 mg





  DAILY SHIRLEY   Administration


 


Amitriptyline HCl  50 mg  01/24/21 21:00  01/25/21 19:38





  Amitriptyline Hcl 25 Mg Tab  PO   50 mg





  HS SHIRLEY   Administration


 


Apixaban  5 mg  01/24/21 09:00  01/26/21 08:04





  Apixaban 5 Mg Tab  PO   5 mg





  BID SHIRLEY   Administration


 


Aspirin  81 mg  01/24/21 09:00  01/26/21 08:04





  Aspirin 81 Mg Enteric Coated Tablet  PO   81 mg





  DAILY SHIRLEY   Administration


 


Atorvastatin Calcium  40 mg  01/24/21 21:00  01/25/21 19:38





  Atorvastatin Calcium 40 Mg Tab  PO   40 mg





  HS SHIRLEY   Administration


 


Carvedilol  6.25 mg  01/24/21 08:00  01/26/21 08:04





  Carvedilol 6.25 Mg Tab  PO   6.25 mg





  BID-WM SHIRLEY   Administration


 


Dexamethasone  6 mg  01/25/21 09:00  01/26/21 08:03





  Dexamethasone 4 Mg/Ml Vial  IVPB   6 mg





  DAILY SHIRLEY   Administration


 


Furosemide  40 mg  01/26/21 07:30  01/26/21 08:05





  Furosemide 40 Mg Tab  PO   Not Given





  DAILY-AC SHIRLEY  


 


Sacubitril/Valsartan  0.5 tab  01/24/21 09:00  01/26/21 08:04





  Sacubitril 24mg/Valsartan 26mg Tab  PO   0.5 tab





  BID SHIRLEY   Administration


 


Sodium Chloride  10 ml  01/24/21 09:00  01/26/21 08:08





  Flush - Normal Saline 10 Ml Syringe  IVF   10 ml





  Q12HR SHIRLEY   Administration


 


Sodium Chloride  10 ml  01/24/21 02:30  01/24/21 17:44





  Flush - Normal Saline 10 Ml Syringe  IVF   10 ml





  PRN PRN   Administration





  Saline Flush  














Hospitalist Exam


Vitals: 


                             Vital Signs (12 hours)











  Temp Pulse Resp BP Pulse Ox


 


 01/26/21 12:18  96.2 F L  72  32 H  138/83  95


 


 01/26/21 11:22  96.5 F L  76  24 H  139/77 


 


 01/26/21 08:21  96.7 F L  70  24 H  137/75  94 L


 


 01/26/21 04:00  97.5 F L  74  24 H  116/64  93 L








                                     Weight











Admit Weight                   195 lb 3.408 oz


 


Weight                         185 lb 14.4 oz














General Appearance: ill appearing


Eye: PERRL, anicteric sclera


ENT: no oropharyngeal lesions, dry oral mucosa


Neck: supple, symmetric


Heart: RRR, no murmur


Respiratory: no wheezes, rales, rhonchi


Gastrointestinal: soft, non-tender, non-distended, normal bowel sounds


Extremities: no cyanosis, no edema


Neurological: cranial nerve grossly intact, no focal deficits





Hosp A/P


(1) Acute on chronic systolic (congestive) heart failure


Code(s): I50.23 - ACUTE ON CHRONIC SYSTOLIC (CONGESTIVE) HEART FAILURE   Status:

Acute   





(2) Pneumonia


Code(s): J18.9 - PNEUMONIA, UNSPECIFIED ORGANISM   Status: Acute   


Qualifiers: 


   Pneumonia type: due to unspecified organism 





(3) Nonischemic cardiomyopathy


Code(s): I42.8 - OTHER CARDIOMYOPATHIES   Status: Chronic   





(4) Atrial fibrillation


Code(s): I48.91 - UNSPECIFIED ATRIAL FIBRILLATION   Status: Chronic   


Qualifiers: 


   Atrial fibrillation type: paroxysmal 





(5) GERD (gastroesophageal reflux disease)


Code(s): K21.9 - GASTRO-ESOPHAGEAL REFLUX DISEASE WITHOUT ESOPHAGITIS   Status: 

Chronic   


Qualifiers: 


   Esophagitis presence: without esophagitis   Qualified Code(s): K21.9 - 

Gastro-esophageal reflux disease without esophagitis   





(6) Hyperlipidemia


Code(s): E78.5 - HYPERLIPIDEMIA, UNSPECIFIED   Status: Chronic   


Qualifiers: 


 





(7) Hypertension


Code(s): I10 - ESSENTIAL (PRIMARY) HYPERTENSION   Status: Chronic   


Qualifiers: 


 





(8) Schizophrenia


Code(s): F20.9 - SCHIZOPHRENIA, UNSPECIFIED   Status: Chronic   


Qualifiers: 


   Schizophrenia type: unspecified   Qualified Code(s): F20.9 - Schizophrenia, 

unspecified   





(9) Type 2 diabetes mellitus


Status: Chronic   


Qualifiers: 


   Diabetes mellitus long term insulin use: without long term use 





- Plan





oral lasix, has prior ef of 15%, likely is noncomplaint?


has nearly 6 covid pcr testing done all of which have been -ve, is presumed to 

have had covid with pulm infiltrates/fibrosis, on dexamethasone.


continue amiodarone 200mg daily, coreg, eliquis, asp, lipitor, entresto.


has chronic lung changes with current volume overload


is not oriented with underlying psychiatric disorder, please obtain home med 

list for it


hemostable, mobilize as tolerated, encourage oral intake, will need home O2.


staff to try and obtain psych med list from [facility] if they can help us out.


prognosis guarded. I have give complete updates to his sister over phone 1/24, 

1/25, 1/26, she is aware of poor prognosis, code status was discussed with her, 

wants him to be full code (1/26).

## 2021-01-27 LAB
ALBUMIN SERPL BCG-MCNC: 2.9 G/DL (ref 3.4–4.8)
ALP SERPL-CCNC: 132 U/L (ref 40–110)
ALT SERPL W P-5'-P-CCNC: 58 U/L (ref 8–55)
ANION GAP SERPL CALC-SCNC: 16 MMOL/L (ref 10–20)
APTT PPP: 31.9 SEC (ref 22.9–36.1)
AST SERPL-CCNC: 42 U/L (ref 5–34)
BASOPHILS # BLD AUTO: 0 THOU/UL (ref 0–0.2)
BASOPHILS NFR BLD AUTO: 0 % (ref 0–1)
BILIRUB DIRECT SERPL-MCNC: 0.5 MG/DL (ref 0.1–0.3)
BILIRUB SERPL-MCNC: 0.8 MG/DL (ref 0.2–1.2)
BUN SERPL-MCNC: 58 MG/DL (ref 8.4–25.7)
CALCIUM SERPL-MCNC: 9.1 MG/DL (ref 7.8–10.44)
CHLORIDE SERPL-SCNC: 107 MMOL/L (ref 98–107)
CO2 SERPL-SCNC: 22 MMOL/L (ref 23–31)
CREAT CL PREDICTED SERPL C-G-VRATE: 43 ML/MIN (ref 70–130)
EOSINOPHIL # BLD AUTO: 0 THOU/UL (ref 0–0.7)
EOSINOPHIL NFR BLD AUTO: 0.1 % (ref 0–10)
GLUCOSE SERPL-MCNC: 111 MG/DL (ref 83–110)
HGB BLD-MCNC: 11 G/DL (ref 14–18)
INR PPP: 1.8
LYMPHOCYTES # BLD: 1 THOU/UL (ref 1.2–3.4)
LYMPHOCYTES NFR BLD AUTO: 7.9 % (ref 21–51)
MCH RBC QN AUTO: 27.8 PG (ref 27–31)
MCV RBC AUTO: 84.9 FL (ref 78–98)
MONOCYTES # BLD AUTO: 0.8 THOU/UL (ref 0.11–0.59)
MONOCYTES NFR BLD AUTO: 6 % (ref 0–10)
NEUTROPHILS # BLD AUTO: 10.9 THOU/UL (ref 1.4–6.5)
NEUTROPHILS NFR BLD AUTO: 86 % (ref 42–75)
PLATELET # BLD AUTO: 348 THOU/UL (ref 130–400)
POTASSIUM SERPL-SCNC: 4.2 MMOL/L (ref 3.5–5.1)
PROTHROMBIN TIME: 21.1 SEC (ref 12–14.7)
RBC # BLD AUTO: 3.97 MILL/UL (ref 4.7–6.1)
SODIUM SERPL-SCNC: 141 MMOL/L (ref 136–145)
WBC # BLD AUTO: 12.7 THOU/UL (ref 4.8–10.8)

## 2021-01-27 RX ADMIN — Medication SCH ML: at 20:45

## 2021-01-27 RX ADMIN — Medication SCH ML: at 08:27

## 2021-01-27 RX ADMIN — ASPIRIN SCH MG: 81 TABLET ORAL at 08:20

## 2021-01-27 NOTE — RAD
Chest AP view



INDICATION: Follow-up



COMPARISON: Prior exam dated January 23, 2021



FINDINGS:



Lungs:  There is worsening bilateral airspace disease



Cardiac silhouette:  Cardiomegaly persists



Pulmonary vasculature:  There is worsening pulmonary vascular congestion



Pleural spaces:  There are small bilateral pleural effusions, new.



Upper abdomen:  No abnormality seen.



Osseous structures:  No acute osseous abnormality.



Additional findings:



IMPRESSION: 

Worsening bilateral airspace disease suspicious for pneumonia. There is cardiomegaly pulmonary vascul
ar congestion and small bilateral pleural effusions suspicious for component of CHF.





Reported By: Wilfredo Wright 

Electronically Signed:  1/27/2021 1:55 PM

## 2021-01-27 NOTE — PQF
CLINICAL DOCUMENTATION CLARIFICATION FORM:









Dear Dr. TITO Barker                 Date: 1/27/2021 6746



Please exercise your independent, professional judgment in responding to the 
clarification form. Clinical indicators are provided on the bottom of this form 
for your review.



Please check appropriate box(es):





[ x ] Acute Respiratory Failure:                        [ x ] with Hypoxia   [  
] with Hypercapnia

[  ] Acute On Chronic Respiratory Failure:  [  ] with Hypoxia    [  ] with 
Hypercapnia

[  ] Acute Respiratory Failure due to: (etiology) ______________________ 

[  ] Chronic Respiratory Failure only        [  ] with Hypoxia       [  ] with 
Hypercapnia

[  ] Hypoxia



[  ] Other diagnosis ___________

[  ] Unable to determine



In addition, please specify:

Present on Admission (POA):  [ x ] Yes             [  ] No             [  ] 
Unable to determine



For continuity of documentation, please document condition throughout progress 
notes and discharge summary.  Thank You.

To be completed by CDI/Coding staff for physician review: 



CLINICAL INDICATORS - SIGNS / SYMPTOMS / LABS    / RESULTS AND LOCATION IN MR



 Found at home disoriented, respirations 40, 93% RA > 97% 3L/NC,  worsening 
multifocal pneumonia with hypoxia (ED report) 1/23



 Patient denies any fever but does endorse chills, and difficulty breathing, 
apparently today family found him more confused than yesterday complaining of 
dyspnea with a RR per EMS of 40 ( H&P/ Pat) 1/23



 Has chronic lung changes with current volume overload, likely with need home 
O2, await diuresis to see if he gets better exertional spo2 levels. ( 
PN/Bear) 1/25



 Has mild resp distress with grunting with each breath, had pulled out his O2 
canula inadvertently this am, pulmonary infiltrates/fibrosis ( PN/Bear) 
1/26



RISK FACTORS  / RESULTS AND LOCATION IN MR



 Pneumonia, Acute on Chronic Systolic CHF ( PN/Bear) 1/26



TREATMENTS    / RESULTS AND LOCATION IN MR





 Supplemental O2 ( 1/24  present)    



 Lasix PO ( 1/26  present) 



Acute Respiratory Failure:  ABG pH < 7.35 or > 7.45; Decreased oxygen saturation
(<90% room air or < 95% on oxygen); PCO2 > 50 mm Hg; PO2 < 60 mm Hg; Labored or 
rapid respirations



ARDS: Dx Criteria [[place] ARDS]: Respiratory symptoms within one week of a known
clinical insult (e.g. shock, infection, surgery, trauma)   Bilateral opacities 
in CXR/Chest CT not due to CHF or fluid







THANK YOU! 





CDS Signature:  Monika Kee RN           Phone #:  [phone]          Date:
1/27/2021







                 This is a permanent part of the Medical Record

[facility]

## 2021-01-27 NOTE — PQF
0                            



                  CLINICAL DOCUMENTATION CLARIFICATION FORM:   





Dear              Date: 1/27/2021  3339



Please exercise your independent, professional judgment in responding to the 
clarification form. Clinical indicators are provided on the bottom of this form 
for your review.



COVID 19 Clarification and Manifestations:  Please check appropriate box(es):



A. COVID 19 virus diagnosis Validation: 



[ x ] COVID-19 Currently being treated            

[  ] COVID-19 Not currently being treated



[  ] Other explanation of clinical findings 
_________________________________________

[  ] Unable to determine



B. COVID 19 virus with associated manifestations: (please check all that apply)



[ x ] Pneumonia 

[ x ] Acute Respiratory Failure            

[  ] ARDS

[  ] Encephalopathy 

[  ] Sepsis Viral



[  ] Other diagnosis ___________

[  ] Unable to determine



In addition, please specify:

Present on Admission (POA):  [ x ] Yes             [  ] No             [  ] 
Unable to determine



For continuity of documentation, please document condition throughout progress 
notes and discharge summary.  Thank You.



To be completed by CDI/Coding staff for physician review: 

Clinical Indicators - Signs / Symptoms / Labs   with Results and Location in 
Medical Record



 Has nearly 6 COVID pcr testing done all of which have been -ve, is presumed to
have COVID with pulmonary infiltrates/fibrosis, on dexamethasone. ( 
PN/Bear) 1/26



 Chest x-ray symptoms compatible with COVID -19, however his test thus far have
been negative (Consult/Mac) 1/25



 Despite negative SARS-CoV-2-PCR tests, the diagnosis of COVID-19 is very 
likely.  The presence of anosmia in the setting of diffuse ground glass 
opacities and imaging studies of the lungs are highly specific for COVID -19 in 
the face of the current pandemic ( Consult/Norris) 1/24



 Patient with multiple recent admissions, one for heart failure and second for 
possible COVID 19 pneumonia.  Patient denies any fever but does endorse chills, 
and difficulty breathing.  Patient does refer some productive cough 
(H&P/Clarence/Rishi) 1/24



Risk Factors with  Results and Location in Medical Record                       
                                             



 Pneumonia, Respiratory distress ( PN/Bear) 1/26



Treatments  with Results and Location in Medical Record



 Supplemental oxygen (1/24  present)



 Infectious disease consult ( Norris/ 1/24)



 Cefepime IV ( 1/24) 



 Vancomycin IV ( 1/24) 











CDS Signature: Monika Kee RN       Phone #:[phone]     Date:1/27/2021 
1056



                 This is a permanent part of the Medical Record

[facility]

## 2021-01-27 NOTE — PDOC.HOSPP
- Subjective


Encounter Date: 01/27/21


Encounter Time: 13:15


Subjective: 


he had pulled his O2 out and tried getting out of bed, desaturating with 

confusion this am


was placed on high flow, restraints were applied due to resp failure and the 

need for O2/fall risk





- Objective


Vital Signs & Weight: 


                             Vital Signs (12 hours)











  Temp Pulse Pulse Resp Resp BP BP


 


 01/27/21 12:00  98.5 F  70   23 H   


 


 01/27/21 11:40    78   26 H   140/92 H


 


 01/27/21 11:16       


 


 01/27/21 08:20       122/69 


 


 01/27/21 08:00  98.9 F  75   22 H   


 


 01/27/21 07:30       


 


 01/27/21 04:00  97.7 F  72   24 H   














  BP Pulse Ox Pulse Ox Pulse Ox


 


 01/27/21 12:00  142/78 H  90 L  


 


 01/27/21 11:40    70 L  91 L


 


 01/27/21 11:16   93 L  


 


 01/27/21 08:20    


 


 01/27/21 08:00  137/73  91 L  


 


 01/27/21 07:30   91 L  


 


 01/27/21 04:00  140/73  91 L  








                                     Weight











Admit Weight                   195 lb 3.408 oz


 


Weight                         187 lb 11.2 oz














I&O: 


                                        











 01/26/21 01/27/21 01/28/21





 06:59 06:59 06:59


 


Intake Total 840 1040 


 


Output Total 1100 1250 


 


Balance -260 -210 











Result Diagrams: 


                                 01/25/21 15:10





                                 01/27/21 04:49





Hospitalist ROS





- Medication


Medications: 


Active Medications











Generic Name Dose Route Start Last Admin





  Trade Name Ramanq  PRN Reason Stop Dose Admin


 


Allopurinol  300 mg  01/24/21 09:00  01/27/21 08:20





  Allopurinol 300 Mg Tab  PO   300 mg





  DAILY SHIRLEY   Administration


 


Amiodarone HCl  200 mg  01/26/21 09:00  01/27/21 08:20





  Amiodarone 200 Mg Tab  PO   200 mg





  DAILY SHIRLEY   Administration


 


Amitriptyline HCl  50 mg  01/24/21 21:00  01/26/21 21:31





  Amitriptyline Hcl 25 Mg Tab  PO   50 mg





  HS SHIRLEY   Administration


 


Apixaban  5 mg  01/24/21 09:00  01/27/21 08:20





  Apixaban 5 Mg Tab  PO   5 mg





  BID SHIRLEY   Administration


 


Aspirin  81 mg  01/24/21 09:00  01/27/21 08:20





  Aspirin 81 Mg Enteric Coated Tablet  PO   81 mg





  DAILY SHIRLEY   Administration


 


Atorvastatin Calcium  40 mg  01/24/21 21:00  01/26/21 21:31





  Atorvastatin Calcium 40 Mg Tab  PO   40 mg





  HS SHIRLEY   Administration


 


Carvedilol  6.25 mg  01/24/21 08:00  01/27/21 08:20





  Carvedilol 6.25 Mg Tab  PO   6.25 mg





  BID-WM SHIRLEY   Administration


 


Dexamethasone  6 mg  01/25/21 09:00  01/27/21 08:20





  Dexamethasone 4 Mg/Ml Vial  IVPB   6 mg





  DAILY SHIRLEY   Administration


 


Furosemide  40 mg  01/26/21 07:30  01/27/21 08:20





  Furosemide 40 Mg Tab  PO   40 mg





  DAILY-AC SHIRLEY   Administration


 


Sacubitril/Valsartan  0.5 tab  01/24/21 09:00  01/27/21 08:27





  Sacubitril 24mg/Valsartan 26mg Tab  PO   0.5 tab





  BID SHIRLEY   Administration


 


Sodium Chloride  10 ml  01/24/21 09:00  01/27/21 08:27





  Flush - Normal Saline 10 Ml Syringe  IVF   10 ml





  Q12HR SHIRLEY   Administration


 


Sodium Chloride  10 ml  01/24/21 02:30  01/24/21 17:44





  Flush - Normal Saline 10 Ml Syringe  IVF   10 ml





  PRN PRN   Administration





  Saline Flush  














Hospitalist Exam


Vitals: 


                             Vital Signs (12 hours)











  Temp Pulse Pulse Resp Resp BP BP


 


 01/27/21 12:00  98.5 F  70   23 H   


 


 01/27/21 11:40    78   26 H   140/92 H


 


 01/27/21 11:16       


 


 01/27/21 08:20       122/69 


 


 01/27/21 08:00  98.9 F  75   22 H   


 


 01/27/21 07:30       


 


 01/27/21 04:00  97.7 F  72   24 H   














  BP Pulse Ox Pulse Ox Pulse Ox


 


 01/27/21 12:00  142/78 H  90 L  


 


 01/27/21 11:40    70 L  91 L


 


 01/27/21 11:16   93 L  


 


 01/27/21 08:20    


 


 01/27/21 08:00  137/73  91 L  


 


 01/27/21 07:30   91 L  


 


 01/27/21 04:00  140/73  91 L  








                                     Weight











Admit Weight                   195 lb 3.408 oz


 


Weight                         187 lb 11.2 oz














General Appearance: ill appearing


Eye: PERRL, anicteric sclera


ENT: no oropharyngeal lesions, dry oral mucosa


Neck: supple, no JVD


Heart: RRR, no murmur


Respiratory: no wheezes, rales, rhonchi


Gastrointestinal: soft, non-tender, non-distended, normal bowel sounds


Extremities: no cyanosis, no edema


Neurological: cranial nerve grossly intact, no focal deficits





Hosp A/P


(1) Acute on chronic systolic (congestive) heart failure


Code(s): I50.23 - ACUTE ON CHRONIC SYSTOLIC (CONGESTIVE) HEART FAILURE   Status:

Acute   





(2) Pneumonia


Code(s): J18.9 - PNEUMONIA, UNSPECIFIED ORGANISM   Status: Acute   


Qualifiers: 


   Pneumonia type: due to unspecified organism 





(3) Nonischemic cardiomyopathy


Code(s): I42.8 - OTHER CARDIOMYOPATHIES   Status: Chronic   





(4) Atrial fibrillation


Code(s): I48.91 - UNSPECIFIED ATRIAL FIBRILLATION   Status: Chronic   


Qualifiers: 


   Atrial fibrillation type: paroxysmal 





(5) GERD (gastroesophageal reflux disease)


Code(s): K21.9 - GASTRO-ESOPHAGEAL REFLUX DISEASE WITHOUT ESOPHAGITIS   Status: 

Chronic   


Qualifiers: 


   Esophagitis presence: without esophagitis   Qualified Code(s): K21.9 - 

Gastro-esophageal reflux disease without esophagitis   





(6) Hyperlipidemia


Code(s): E78.5 - HYPERLIPIDEMIA, UNSPECIFIED   Status: Chronic   


Qualifiers: 


 





(7) Hypertension


Code(s): I10 - ESSENTIAL (PRIMARY) HYPERTENSION   Status: Chronic   


Qualifiers: 


 





(8) Schizophrenia


Code(s): F20.9 - SCHIZOPHRENIA, UNSPECIFIED   Status: Chronic   


Qualifiers: 


   Schizophrenia type: unspecified   Qualified Code(s): F20.9 - Schizophrenia, 

unspecified   





(9) Type 2 diabetes mellitus


Status: Chronic   


Qualifiers: 


   Diabetes mellitus long term insulin use: without long term use 





- Plan





iv lasix one dose now, cxr this am shows worsening congestion,


prior ef of 15%, likely is noncomplaint?


has nearly 6 covid pcr testing done all of which have been -ve, is presumed to 

have had covid with pulm infiltrates/fibrosis, on dexamethasone.


continue amiodarone 200mg daily, coreg, eliquis, asp, lipitor, entresto.


has chronic lung changes with current volume overload


is not fully oriented with underlying psychiatric disorder, please obtain home 

med list for it


hemostable, mobilize as tolerated, encourage oral intake, will need home O2.


staff to try and obtain psych med list from [facility] if they can help us out.


prognosis guarded. I have give complete updates to his sister over phone 1/24, 

1/25, 1/26, 1/27, she is aware of poor prognosis, code status was discussed with

her, wants him to be full code (1/26).

## 2021-01-28 LAB
ALBUMIN SERPL BCG-MCNC: 1.8 G/DL (ref 3.4–4.8)
ANALYZER IN CARDIO: (no result)
ANALYZER IN CARDIO: (no result)
ANION GAP SERPL CALC-SCNC: (no result) MMOL/L (ref 10–20)
ANION GAP SERPL CALC-SCNC: 20 MMOL/L (ref 10–20)
APTT PPP: 32.6 SEC (ref 22.9–36.1)
BASE EXCESS STD BLDA CALC-SCNC: -16 MEQ/L
BASE EXCESS STD BLDV CALC-SCNC: -3.7 MEQ/L
BUN SERPL-MCNC: 41 MG/DL (ref 8.4–25.7)
BUN SERPL-MCNC: 57 MG/DL (ref 8.4–25.7)
CA-I BLDA-SCNC: 1.21 MMOL/L (ref 1.12–1.3)
CA-I BLDV-MCNC: 1.04 MMOL/L (ref 1.16–1.32)
CALCIUM SERPL-MCNC: 5.8 MG/DL (ref 7.8–10.44)
CALCIUM SERPL-MCNC: 8.9 MG/DL (ref 7.8–10.44)
CHLORIDE BLDV-SCNC: 108 MMOL/L (ref 98–106)
CHLORIDE SERPL-SCNC: 110 MMOL/L (ref 98–107)
CHLORIDE SERPL-SCNC: 130 MMOL/L (ref 98–107)
CO2 SERPL-SCNC: 10 MMOL/L (ref 23–31)
CO2 SERPL-SCNC: 15 MMOL/L (ref 23–31)
CREAT CL PREDICTED SERPL C-G-VRATE: 45 ML/MIN (ref 70–130)
CREAT CL PREDICTED SERPL C-G-VRATE: 58 ML/MIN (ref 70–130)
CRP SERPL-MCNC: 12.35 MG/DL
GLUCOSE SERPL-MCNC: 134 MG/DL (ref 83–110)
GLUCOSE SERPL-MCNC: 77 MG/DL (ref 83–110)
HCO3 BLDA-SCNC: 12.1 MEQ/L (ref 22–28)
HCO3 BLDV-SCNC: 20 MEQ/L (ref 22–28)
HCT VFR BLDA CALC: 34 % (ref 42–52)
HCT VFR BLDV CALC: 34 % (ref 42–52)
HGB BLDA-MCNC: 11.5 G/DL (ref 14–18)
HGB BLDV-MCNC: 11.7 G/DL (ref 12.6–17.4)
INR PPP: 2
LIPASE SERPL-CCNC: 4 U/L (ref 8–78)
PCO2 BLDA: 36.6 MMHG (ref 35–45)
PH BLDA: 7.14 [PH] (ref 7.35–7.45)
PO2 BLDA: 358.6 MMHG (ref 70–?)
POTASSIUM BLD-SCNC: 6.11 MMOL/L (ref 3.7–5.3)
POTASSIUM BLDV-SCNC: 6.57 MMOL/L (ref 3.7–5.3)
POTASSIUM SERPL-SCNC: 3 MMOL/L (ref 3.5–5.1)
POTASSIUM SERPL-SCNC: 5.7 MMOL/L (ref 3.5–5.1)
PROTHROMBIN TIME: 23 SEC (ref 12–14.7)
SODIUM BLDV-SCNC: 138.4 MMOL/L (ref 133–146)
SODIUM SERPL-SCNC: 134 MMOL/L (ref 136–145)
SODIUM SERPL-SCNC: 140 MMOL/L (ref 136–145)
SPECIMEN DRAWN FROM PATIENT: (no result)

## 2021-01-28 PROCEDURE — 3E033XZ INTRODUCTION OF VASOPRESSOR INTO PERIPHERAL VEIN, PERCUTANEOUS APPROACH: ICD-10-PCS | Performed by: INTERNAL MEDICINE

## 2021-01-28 PROCEDURE — 0BH17EZ INSERTION OF ENDOTRACHEAL AIRWAY INTO TRACHEA, VIA NATURAL OR ARTIFICIAL OPENING: ICD-10-PCS | Performed by: INTERNAL MEDICINE

## 2021-01-28 PROCEDURE — 06HY33Z INSERTION OF INFUSION DEVICE INTO LOWER VEIN, PERCUTANEOUS APPROACH: ICD-10-PCS | Performed by: INTERNAL MEDICINE

## 2021-01-28 PROCEDURE — 5A1955Z RESPIRATORY VENTILATION, GREATER THAN 96 CONSECUTIVE HOURS: ICD-10-PCS | Performed by: INTERNAL MEDICINE

## 2021-01-28 RX ADMIN — Medication SCH ML: at 21:38

## 2021-01-28 RX ADMIN — ALBUTEROL SULFATE SCH PUFF: 108 INHALANT RESPIRATORY (INHALATION) at 13:31

## 2021-01-28 RX ADMIN — ALBUTEROL SULFATE SCH PUFF: 108 INHALANT RESPIRATORY (INHALATION) at 06:36

## 2021-01-28 RX ADMIN — ASPIRIN SCH MG: 81 TABLET ORAL at 08:11

## 2021-01-28 RX ADMIN — ALBUTEROL SULFATE SCH PUFF: 108 INHALANT RESPIRATORY (INHALATION) at 00:50

## 2021-01-28 RX ADMIN — Medication SCH ML: at 10:15

## 2021-01-28 RX ADMIN — ALBUTEROL SULFATE SCH PUFF: 108 INHALANT RESPIRATORY (INHALATION) at 18:40

## 2021-01-28 NOTE — EKG
Test Reason : AFTER CODE BLUE

Blood Pressure : ***/*** mmHG

Vent. Rate : 059 BPM     Atrial Rate : 051 BPM

   P-R Int : 000 ms          QRS Dur : 112 ms

    QT Int : 452 ms       P-R-T Axes : 000 047 119 degrees

   QTc Int : 447 ms

 

Junctional rhythm

Low voltage QRS

Possible Inferior infarct , age undetermined



Abnormal ECG

When compared with ECG of 23-JAN-2021 20:29, (Unconfirmed)

Significant changes have occurred

Confirmed by DR. NANCY LYNN ([id number]) on 1/28/2021 5:14:48 PM

 

Referred By:  AILYN           Confirmed By:DR. NANCY LYNN

## 2021-01-28 NOTE — RAD
Chest AP view



INDICATION: History of intubation



COMPARISON: January 27, 2021 at 1:43 PM



FINDINGS:



Lungs:  Bilateral airspace disease persists



Cardiac silhouette:  Cardiomegaly is stable



Pulmonary vasculature:  Prominent pulmonary vascular congestion persists



Pleural spaces:  Small bilateral pleural effusions are similar.



Upper abdomen:  No abnormality seen.



Osseous structures:  No acute osseous abnormality.



Additional findings:  Patient is not intubated with the ET tube tip seen 2.2 cm from the erick. Pace
r pad overlies left chest wall.



IMPRESSION: 



1. Interval intubation. ET tube tip projects in expected position.

2. Persistent bilateral airspace disease suspicious for pneumonia and edema.

2. Persistent cardiomegaly pulmonary vascular congestion small pleural effusions suggesting CHF



Reported By: Wilfredo Wright 

Electronically Signed:  1/28/2021 1:44 PM

## 2021-01-28 NOTE — PDOC.HOSPP
- Subjective


Encounter Date: 01/28/21


Encounter Time: 15:00


Subjective: 


I initially saw him around 10 am when he was comfortable on high flow O2, he 

wanted his restraints removed and was hungry and thirsty.


Later around noon a code was called as he became unresponsive but was agitated 

before that per staff, he got intubated and transfered to ICU.


Had multiple electrolyte issues in am labs, repeat stat labs were ordered, he 

got 20meq iv kcl and further oral doses were ordered, one dose of calcium 

gluconate was given stat as well.


Post code blue he developed junctional rhythm with heart rates dipping into 50's

and was given epinephrine and later drip was started


His abg showed a ph of around 7.1 with overcorrection of Potassium? and very low

hco3, which were promptly replaced.


 is at bedside monitoring him closely.





- Objective


Vital Signs & Weight: 


                             Vital Signs (12 hours)











  Temp Pulse Resp BP BP Pulse Ox


 


 01/28/21 14:30       96


 


 01/28/21 14:09   74   141/82 H  


 


 01/28/21 14:00    30 H   


 


 01/28/21 13:29   97    


 


 01/28/21 12:22       70 L


 


 01/28/21 11:08  98.2 F  104 H  30 H   133/98 H  94 L


 


 01/28/21 08:23       95


 


 01/28/21 08:11       95


 


 01/28/21 08:00  98.5 F  74  28 H   155/83 H  95








                                     Weight











Admit Weight                   195 lb 3.408 oz


 


Weight                         182 lb 11.2 oz











                            Most Recent Monitor Data











Heart Rate from ECG            87


 


NIBP                           183/103


 


NIBP BP-Mean                   129


 


Respiration from ECG           30


 


SpO2                           96














I&O: 


                                        











 01/27/21 01/28/21 01/29/21





 06:59 06:59 06:59


 


Intake Total 1040 1060 700


 


Output Total 1250  5


 


Balance -210 1060 695











Result Diagrams: 


                                 01/30/21 03:05





                                 01/30/21 03:05


Additional Labs: 


                                   Accuchecks











  01/28/21





  12:27


 


POC Glucose  133 H














Hospitalist ROS





- Medication


Medications: 


Active Medications











Generic Name Dose Route Start Last Admin





  Trade Name Freq  PRN Reason Stop Dose Admin


 


Albuterol Sulfate  2 puff  01/28/21 01:00  01/28/21 13:31





  Albuterol 200 Puff (6.7gm Inhaler)  INH   2 puff





  [id number] SHIRLEY   Administration


 


Allopurinol  100 mg  01/28/21 09:00  01/28/21 08:11





  Allopurinol 100 Mg Tab  PO   100 mg





  DAILY SHIRLEY   Administration


 


Apixaban  5 mg  01/24/21 09:00  01/28/21 08:11





  Apixaban 5 Mg Tab  PO   5 mg





  BID SHIRLEY   Administration


 


Atorvastatin Calcium  40 mg  01/24/21 21:00  01/27/21 20:45





  Atorvastatin Calcium 40 Mg Tab  PO   40 mg





  HS SHIRLEY   Administration


 


Carvedilol  6.25 mg  01/24/21 08:00  01/28/21 08:10





  Carvedilol 6.25 Mg Tab  PO   6.25 mg





  [id number] SHIRLEY   Administration


 


Dexamethasone  6 mg  01/25/21 09:00  01/28/21 08:11





  Dexamethasone 4 Mg/Ml Vial  IVPB   6 mg





  DAILY SHIRLEY   Administration


 


Furosemide  40 mg  01/28/21 14:00  01/28/21 14:53





  Furosemide 40 Mg/4 Ml Vial  SLOW IVP   40 mg





  0600,1400 SHIRLEY   Administration


 


Cefepime HCl 1 gm/ Sodium  100 mls @ 200 mls/hr  01/27/21 17:00  01/28/21 06:24





  Chloride  IVPB   100 mls





  0500,1700 SHIRLEY   Administration


 


Doxycycline Hyclate 100 mg/  100 mls @ 100 mls/hr  01/28/21 08:00  01/28/21 

10:15





  Sodium Chloride  IVPB   100 mls





  0800,2000 SHIRLEY   Administration


 


Sodium Chloride  1,000 mls @ 70 mls/hr  01/28/21 15:30  01/28/21 15:44





  Normal Saline 0.9%  IV   Not Given





  [id number] SHIRLEY  


 


Sodium Chloride  10 ml  01/24/21 09:00  01/28/21 10:15





  Flush - Normal Saline 10 Ml Syringe  IVF   10 ml





  [id number] SHIRLEY   Administration


 


Sodium Chloride  10 ml  01/24/21 02:30  01/24/21 17:44





  Flush - Normal Saline 10 Ml Syringe  IVF   10 ml





  PRN PRN   Administration





  Saline Flush  














Hospitalist Exam


Vitals: 


                             Vital Signs (12 hours)











  Temp Pulse Resp BP BP Pulse Ox


 


 01/28/21 14:30       96


 


 01/28/21 14:09   74   141/82 H  


 


 01/28/21 14:00    30 H   


 


 01/28/21 13:29   97    


 


 01/28/21 12:22       70 L


 


 01/28/21 11:08  98.2 F  104 H  30 H   133/98 H  94 L


 


 01/28/21 08:23       95


 


 01/28/21 08:11       95


 


 01/28/21 08:00  98.5 F  74  28 H   155/83 H  95








                                     Weight











Admit Weight                   195 lb 3.408 oz


 


Weight                         182 lb 11.2 oz











                            Most Recent Monitor Data











Heart Rate from ECG            87


 


NIBP                           183/103


 


NIBP BP-Mean                   129


 


Respiration from ECG           30


 


SpO2                           96














General Appearance: ill appearing


Eye: PERRL, anicteric sclera


ENT: no oropharyngeal lesions, dry oral mucosa


Neck: supple, no JVD


Heart: RRR, no murmur


Respiratory: no wheezes, rales, rhonchi


Gastrointestinal: soft, non-tender, non-distended, normal bowel sounds


Extremities: no cyanosis, 1+ LE edema


Neurological: cranial nerve grossly intact, no focal deficits





Hosp A/P


(1) Acute respiratory failure with hypoxia and hypercarbia


Code(s): J96.01 - ACUTE RESPIRATORY FAILURE WITH HYPOXIA; J96.02 - ACUTE 

RESPIRATORY FAILURE WITH HYPERCAPNIA   Status: Acute   





(2) Acute on chronic systolic (congestive) heart failure


Code(s): I50.23 - ACUTE ON CHRONIC SYSTOLIC (CONGESTIVE) HEART FAILURE   Status:

Acute   





(3) Pneumonia


Code(s): J18.9 - PNEUMONIA, UNSPECIFIED ORGANISM   Status: Acute   


Qualifiers: 


   Pneumonia type: due to unspecified organism 





(4) Nonischemic cardiomyopathy


Code(s): I42.8 - OTHER CARDIOMYOPATHIES   Status: Chronic   





(5) Atrial fibrillation


Code(s): I48.91 - UNSPECIFIED ATRIAL FIBRILLATION   Status: Chronic   


Qualifiers: 


   Atrial fibrillation type: paroxysmal   Qualified Code(s): I48.0 - Paroxysmal 

atrial fibrillation   





(6) GERD (gastroesophageal reflux disease)


Code(s): K21.9 - GASTRO-ESOPHAGEAL REFLUX DISEASE WITHOUT ESOPHAGITIS   Status: 

Chronic   


Qualifiers: 


   Esophagitis presence: without esophagitis   Qualified Code(s): K21.9 - 

Gastro-esophageal reflux disease without esophagitis   





(7) Hyperlipidemia


Code(s): E78.5 - HYPERLIPIDEMIA, UNSPECIFIED   Status: Chronic   


Qualifiers: 


 





(8) Hypertension


Code(s): I10 - ESSENTIAL (PRIMARY) HYPERTENSION   Status: Chronic   


Qualifiers: 


 





(9) Schizophrenia


Code(s): F20.9 - SCHIZOPHRENIA, UNSPECIFIED   Status: Suspected   


Qualifiers: 


   Schizophrenia type: unspecified   Qualified Code(s): F20.9 - Schizophrenia, 

unspecified   





(10) Type 2 diabetes mellitus


Status: Chronic   


Qualifiers: 


   Diabetes mellitus long term insulin use: without long term use 





(11) Hypocalcemia


Code(s): E83.51 - HYPOCALCEMIA   Status: Resolved   





(12) Hypokalemia


Code(s): E87.6 - HYPOKALEMIA   Status: Resolved   





- Plan





he got intubated with code blue today, junctional rhythm on epinephrine drip, 

multiple electrolyte abnormalities, mixed acidosis with low ph of 7.1


poor prognosis, has multiple med issues including non compliance and suspected 

underlying schizoaffective disorder. 


Unclear if he has sustained anoxic brain injury on top of above with today's 

code blue, although witnessed and prompt resucitative measures were done.


If a second resucitation is done his chances of survival are very bleak and 

suggest comfort care at that point, D/w  and he agrees with it, further 

resucitation will be futile and will not change his outcome.


contnue diuresis as tolerated with close monitoring of electrolytes, cxr this am

shows worsening congestion,


prior ef of 15%, likely is noncomplaint?


has nearly 6 covid pcr testing done all of which have been -ve, is presumed to 

have had covid with pulm infiltrates/fibrosis, on dexamethasone per ID advice.


continue amiodarone 400mg bid per 's adv, coreg low dose, eliquis, 

asp, lipitor. May hold eliquis and entresto if ok with cardiology given his 

labile situation and possible worsening of renal function/bleeding.


has chronic lung changes with current volume overload.


I have give complete updates to his sister over phone 1/24, 1/25, 1/26, 1/27, 

she is aware of poor prognosis, code status was discussed with her, wants him to

be full code (1/26). I d/w sister this am and again after the code blue, she is 

aware of imminent death if he were to code again. Social work consultation was 

requested to find if he has any children who are in touch with him for next of 

kin, per sister he has many but none are in touch with him, they both live 

together and help each other.

## 2021-01-29 LAB
ANALYZER IN CARDIO: (no result)
ANION GAP SERPL CALC-SCNC: 16 MMOL/L (ref 10–20)
BACTERIA UR QL AUTO: (no result) HPF
BASE EXCESS STD BLDA CALC-SCNC: 3.1 MEQ/L
BASOPHILS # BLD AUTO: 0 THOU/UL (ref 0–0.2)
BASOPHILS NFR BLD AUTO: 0.2 % (ref 0–1)
BUN SERPL-MCNC: 67 MG/DL (ref 8.4–25.7)
CA-I BLDA-SCNC: 1.11 MMOL/L (ref 1.12–1.3)
CALCIUM SERPL-MCNC: 8.2 MG/DL (ref 7.8–10.44)
CHLORIDE SERPL-SCNC: 107 MMOL/L (ref 98–107)
CO2 SERPL-SCNC: 24 MMOL/L (ref 23–31)
CREAT CL PREDICTED SERPL C-G-VRATE: 31 ML/MIN (ref 70–130)
EOSINOPHIL # BLD AUTO: 0 THOU/UL (ref 0–0.7)
EOSINOPHIL NFR BLD AUTO: 0.1 % (ref 0–10)
GLUCOSE SERPL-MCNC: 126 MG/DL (ref 83–110)
HCO3 BLDA-SCNC: 24 MEQ/L (ref 22–28)
HCT VFR BLDA CALC: 31 % (ref 42–52)
HGB BLD-MCNC: 9.5 G/DL (ref 14–18)
HGB BLDA-MCNC: 10.5 G/DL (ref 14–18)
LEUKOCYTE ESTERASE UR QL STRIP.AUTO: 25 LEU/UL
LYMPHOCYTES # BLD: 1.5 THOU/UL (ref 1.2–3.4)
LYMPHOCYTES NFR BLD AUTO: 11.3 % (ref 21–51)
MCH RBC QN AUTO: 26.5 PG (ref 27–31)
MCV RBC AUTO: 83.6 FL (ref 78–98)
MONOCYTES # BLD AUTO: 0.9 THOU/UL (ref 0.11–0.59)
MONOCYTES NFR BLD AUTO: 6.7 % (ref 0–10)
NEUTROPHILS # BLD AUTO: 10.6 THOU/UL (ref 1.4–6.5)
NEUTROPHILS NFR BLD AUTO: 81.7 % (ref 42–75)
O2 A-A PPRESDIFF RESPIRATORY: 192.43 MMHG (ref 0–20)
PCO2 BLDA: 25.1 MMHG (ref 35–45)
PH BLDA: 7.6 [PH] (ref 7.35–7.45)
PLATELET # BLD AUTO: 334 THOU/UL (ref 130–400)
PO2 BLDA: 61.4 MMHG (ref 70–?)
POTASSIUM BLD-SCNC: 3.85 MMOL/L (ref 3.7–5.3)
POTASSIUM SERPL-SCNC: 4.3 MMOL/L (ref 3.5–5.1)
PROT UR-MCNC: (no result) MG/DL (ref 1–14)
RBC # BLD AUTO: 3.6 MILL/UL (ref 4.7–6.1)
SODIUM SERPL-SCNC: 143 MMOL/L (ref 136–145)
SODIUM UR-SCNC: 75 MMOL/L
SP GR UR STRIP: 1.01 (ref 1–1.04)
SPECIMEN DRAWN FROM PATIENT: (no result)
UUN UR-MCNC: 386 MG/DL
WBC # BLD AUTO: 13 THOU/UL (ref 4.8–10.8)
WBC UR QL AUTO: (no result) HPF (ref 0–3)

## 2021-01-29 RX ADMIN — ALBUTEROL SULFATE SCH PUFF: 108 INHALANT RESPIRATORY (INHALATION) at 06:38

## 2021-01-29 RX ADMIN — ALBUTEROL SULFATE SCH PUFF: 108 INHALANT RESPIRATORY (INHALATION) at 13:15

## 2021-01-29 RX ADMIN — ALBUTEROL SULFATE SCH PUFF: 108 INHALANT RESPIRATORY (INHALATION) at 00:13

## 2021-01-29 RX ADMIN — ALBUMIN HUMAN SCH GM: 250 SOLUTION INTRAVENOUS at 20:30

## 2021-01-29 RX ADMIN — Medication SCH MLS: at 21:22

## 2021-01-29 RX ADMIN — Medication SCH ML: at 20:20

## 2021-01-29 RX ADMIN — ASPIRIN 81 MG CHEWABLE TABLET SCH MG: 81 TABLET CHEWABLE at 10:05

## 2021-01-29 RX ADMIN — Medication SCH ML: at 10:05

## 2021-01-29 RX ADMIN — ALBUMIN HUMAN SCH GM: 250 SOLUTION INTRAVENOUS at 13:28

## 2021-01-29 RX ADMIN — ALBUTEROL SULFATE SCH PUFF: 108 INHALANT RESPIRATORY (INHALATION) at 18:20

## 2021-01-29 NOTE — OP
DATE OF PROCEDURE:  01/28/2021



Mr. Wick had 2 procedures; arterial line placement and central line placement.  His

right femoral artery was palpated.  He was blindly stuck with a 5-Cymro needle.  A

wire was passed.  I could not consistently pass a wire, even though there was

excellent blood flow, it was pulsatile out of the needle.  He appears to have

significant plaque in his femoral artery.  I did get the catheter in, but I got no

blood return out of the catheter, so that procedure was aborted.  His right femoral

vein was easily cannulated with an introducer needle.  A vein dilator was inserted

over a wire.  Vein dilator was withdrawn.  A triple-lumen catheter was inserted and

sewn in place x3 and the wires were withdrawn and discarded.  Good blood return was

obtained from all 3 ports.  Tolerated procedure well.  Sterile dressing was applied. 







Job ID:  [id number]

## 2021-01-29 NOTE — RAD
CHEST 1 VIEW:

 

HISTORY: 

Ventilated patient.

 

COMPARISON: 

Radiograph of prior day.

 

FINDINGS: 

Extensive airspace consolidation.  Endotracheal tube tip above the erick 3 cm.  Enteric tube tip bel
ow the diaphragm out of the field of view.  Heart size is enlarged.  No acute osseous abnormality.

 

IMPRESSION: 

No significant interval change of the chest.  No improved lung aeration.  Relative to a chest radiogr
aph 1 week prior, findings are also not significantly improved.

 

POS: HOME

## 2021-01-29 NOTE — CON
DATE OF CONSULTATION:  01/28/2021



HISTORY OF PRESENT ILLNESS:  Mr. Wick is a 75-year-old male, who apparently 
coded

today, was transferred, intubated to the critical care unit.  He received

intermittently three more milligrams of epinephrine after he arrived in the ICU.

Blood gas showed significant acidemia with pH 7.14, CO2 36, and PO2 358.  He was

then hyperventilated, given two amps of bicarb, and hemodynamically stabilized. 



Chest radiograph shows diffuse infiltrates and bilateral effusions.  He has a

history of severe cardiomyopathy. 



We were consulted to assist in his management.



PAST MEDICAL HISTORY:  Remarkable for,

1. Coronary disease.

2. History of an ejection fraction of 15-20% last fall.

3. Atrial fibrillation, treated with amiodarone, starting last fall.

4. History of noncompliance with followup.

5. History of mental illness, reportedly schizophrenia.

6. History of multiple COVID workups recently that have been negative.

7. History of smoking and dipping.

8. History of lipid disorder.

9. History of diabetes.



FAMILY HISTORY:  Negative for lung disease in early age.



REVIEW OF SYSTEMS:  Not obtainable with Levophed.



OBJECTIVE:  VITAL SIGNS:  His blood pressure is over 100, heart rate is in the 
60s,

respiratory rate 30, FiO2 is at 50%. 

HEAD AND NECK:  Reactive pupils.  Sclerae are anicteric.  Neck is without

lymphadenopathy. 

LUNGS:  Remarkable for coarse equal breath sounds. 

HEART:  Regular rhythm. 

ABDOMEN:  Soft. 

EXTREMITIES:  Without clubbing, cyanosis, or edema.



LABORATORY DATA:  White count 12.7, hemoglobin 11.0, platelets 348.  Sodium 134,

potassium 5.7, chloride 110, bicarb 10, BUN 57, creatinine 1.63.  BNP was 3710. 



IMPRESSION:  Status post cardiorespiratory arrest with severe metabolic acidosis
and

history of severe cardiomyopathy.  I suspect this is consistent with worsening 
of

his left ventricular systolic function on top of acute renal failure. 



It is unclear to me at this time whether or not he was taking amiodarone at 
home.

If he was, his diffuse infiltrates could in theory also be attributed to 
amiodarone

toxicity.  I think it is less likely this is COVID-19. 



I think it is very likely that this is a high morbidity-mortality event.  As we 
have

progressed into the evening, he is developing some myoclonic jerking.  I suspect
he

will not regain his pre-existing neurological function. 



CRITICAL CARE TIME:  45 minutes excluding procedures









Job ID:  [id number]



MTDD

## 2021-01-29 NOTE — CON
DATE OF CONSULTATION:  01/28/2021



SERVICE:  Nephrology.



REASON FOR CONSULTATION:  Hyperchloremia and renal insufficiency.



REQUESTING PROVIDER:  Goldie Sifuentes MD.



HISTORY OF PRESENT ILLNESS:  A 75-year-old male with known history of dementia,

atrial fibrillation, nonischemic cardiomyopathy with baseline EF ranging from 10 to

15, diabetes, amongst others, who was admitted on January 23, 2021, on transfer from

[place] due to altered mental status and worsening pneumonia.  The patient on

presentation had elevated creatinine of 1.52, which worsened to a peak of 1.90 with

diuretic therapy.  The patient earlier today was found to have abnormal electrolytes

with potassium of 3.0 and chloride of 130, as well as a calcium of 5.8,

necessitating Nephrology consult.  Of note, the patient was noted to have worsening

respiratory status, associated with worsening acidosis, hence was intubated and

transferred to the ICU.  Even prior to presentation, the patient was a poor

historian, one who was unable to provide any significant history.  Of note, chest

x-ray on presentation showed multifocal pneumonia, suggestive of atypical pneumonia

with COVID infection as a possibility.  However, COVID test has been negative. 



PAST MEDICAL HISTORY:  

1. Severe nonischemic cardiomyopathy with ejection fraction of 10 to 15.

2. Paroxysmal atrial fibrillation.

3. Gastroesophageal reflux disease.

4. Gout.

5. Schizophrenia.

6. Hyperlipidemia.

7. Diabetes mellitus.



PAST SURGICAL HISTORY:  Appendectomy.



FAMILY HISTORY:  Could not be obtained due to the patient's condition.



SOCIAL HISTORY:  Reportedly, he smokes cigarettes and also dips snuff.  There is no

history of alcohol use. 



ALLERGIES:  NO KNOWN DRUG ALLERGIES REPORTED.



MEDICATIONS:  Prior to hospital, medications are as follows;

1. Allopurinol 300 mg p.o. daily.

2. Amiodarone 400 mg p.o. b.i.d.

3. Amitriptyline 50 mg p.o. daily at bedtime.

4. Eliquis 5 mg p.o. b.i.d.

5. Aspirin 81 mg p.o. daily.

6. Lipitor 40 mg daily at bedtime.

7. Carvedilol 6.25 b.i.d.

8. Fluoxetine 20 mg p.o. daily.

9. Furosemide 20 mg p.o. daily.

10. Metformin 500 mg daily at bedtime.

11. Entresto 24/26 half tablet p.o. b.i.d. 



Current hospital medications;

1. __________.

2. Cefepime 1 g b.i.d.

3. Doxycycline 100 mg IV b.i.d.

4. Allopurinol 100 mg p.o. daily.

5. Amiodarone 400 mg p.o. b.i.d.

6. Eliquis 5 mg p.o. b.i.d.

7. Aspirin 81 mg p.o. daily.

8. Lipitor 40 mg daily at bedtime.

9. Carvedilol 6.25 mg p.o. b.i.d.

10. Dexamethasone 6 mg IV daily.

11. Furosemide 40 mg IV b.i.d.

12. Potassium chloride 40 mEq every 6 hours x3 doses.



REVIEW OF SYSTEMS:  Could not be performed due to the patient's condition.



PHYSICAL EXAMINATION:

VITAL SIGNS:  Temperature 99.9, pulse 63, respiratory rate 30, SpO2 of 100%, blood

pressure 108/64. 

GENERAL:  Heavy built male.  Afebrile. 

HEENT:  Normocephalic and atraumatic. 

CARDIOVASCULAR:  Irregular rhythm. 

RESPIRATORY:  Ventilator transmitted breath sounds noted. 

GI:  Full, soft, nondistended. 

EXTREMITIES:  Mild bilateral elbow fullness/edema noted. 

CNS:  Sedated and unresponsive.



DIAGNOSTIC DATA:  BMP earlier today showed a sodium of 140, potassium 3.0, chloride

130, CO2 of 15, BUN 41, creatinine 1.26, glucose 77, calcium 5.9, phosphorus 2.5,

albumin 1.8.  This is grossly different from previous lab results.  A day prior to

this on January 27, sodium was 141, potassium 4.2, chloride 107, CO2 of 22, BUN 58,

creatinine 1.74, glucose 111, calcium 9.1, total bilirubin 0.8, AST 42, ALT 58,

alkaline phosphatase 132, total protein 6.7, albumin 2.9.  Repeat BMP later this

afternoon showed sodium 134, potassium 5.7, chloride 110, CO2 of 10, BUN 57,

creatinine 1.63, glucose 134, calcium 8.9. 



Vitamin D level is 25.9. 



Arterial blood gas done earlier today in the afternoon showed pH of 7.14, pCO2 of

36.6, pO2 of 358, ionized calcium of 1.21. 



Chest x-ray performed this afternoon post intubation, showed interval intubation.

Persistent bilateral airspace disease suspicious for pneumonia and edema, as well as

persistent cardiomegaly, pulmonary vascular congestion and pleural effusion

suggestive of CHF. 



ASSESSMENT:  

1. Hyperchloremia:  This is most likely due to lab error.  It is completely at

variance with previous labs and repeat. 

2. Hypokalemia noted initially:  Most likely due to lab error.

3. Hyperkalemia:  Noted on most recent lab, most likely due to supplementation and

severe metabolic acidosis with potassium shift. 

4. Severe metabolic acidosis:  Due to circulatory shock related to congestive heart

failure and possible pneumonia. 

5. Chronic kidney disease, stage 3:  Seems to be stable with possible mild

reversible component. 

6. Vitamin D deficiency.



PLAN:  We will start alkali therapy with sodium bicarbonate.  We will however run

this at a very slow rate, given pulmonary congestion.  We will monitor electrolytes

and address as indicated.  Optimization of hemodynamics with possible inotropic will

be deferred to the Cardiology Team and intensivist. 



Many thanks for involving us in the care of this patient.  We will follow along with

you. 







Job ID:  [id number]

## 2021-01-29 NOTE — PRG
DATE OF SERVICE:  01/29/2021



35 minutes critical care time.



SUBJECTIVE:  Events of yesterday were noted.  The patient is currently intubated on

mechanical ventilation. 



OBJECTIVE:  VITAL SIGNS:  Temperature 97.5, pulse 58, blood pressure 114/61.  He has

been weaned off the Levophed drip.  He had a total of 2866 in yesterday and 815 out. 

NEUROLOGICAL:  He will not wake up and follow commands, but will withdraw to pain in

all extremities. 

HEENT:  Otherwise unremarkable. 

NECK:  No JVD. 

LUNGS:  Tachypneic.  Crackles bilaterally. 

CARDIOVASCULAR:  S1, S2.  Slightly bradycardic. 

ABDOMEN:  Soft, obese. 

EXTREMITIES:  He has a right groin femoral line.



LABORATORY DATA:  His sodium is 143, potassium 4.3, chloride 107, CO2 24, BUN 67,

creatinine 2.4, glucose 126.  ABG is pending.  White blood cell count 13, hematocrit

30, platelet count 334. 



ASSESSMENT:  

1. Post cardiopulmonary arrest.

2. Severe metabolic acidosis which seems to be corrected with bicarbonate drip.

3. Severe cardiomyopathy.

4. X-ray showing bilateral infiltrates, which likely indicates pulmonary edema or

pneumonia as he has antibodies to COVID indicating he had COVID in the remote past. 



PLAN:  

1. Check ABG as I think we can probably slow down his ventilator rate.

2. Continue bicarbonate infusion.

3. Prognosis very guarded at this point.







Job ID:  [id number]

## 2021-01-29 NOTE — PDOC.NEPPN
- Subjective


Encounter Date: 01/29/21


Subjective: 


Seen and examined. Developed hypotension after intubation requiring pressors. BP

has improved and pressor is off.





- Objective


Vital Signs & Weight: 


                             Vital Signs (12 hours)











  Temp Pulse Resp BP Pulse Ox


 


 01/29/21 12:00  98.0 F   23 H  


 


 01/29/21 10:30   59 L   


 


 01/29/21 10:00    22 H  


 


 01/29/21 08:41     108/56 L 


 


 01/29/21 08:37  97.9 F    


 


 01/29/21 08:00      98


 


 01/29/21 06:38   56 L   


 


 01/29/21 02:40   60   








                                     Weight











Admit Weight                   195 lb 3.408 oz


 


Weight                         90 lb 3.2 oz











                            Most Recent Monitor Data











Heart Rate from ECG            62


 


NIBP                           104/51


 


NIBP BP-Mean                   68


 


Respiration from ECG           26


 


SpO2                           98














I&O: 


                                        











 01/28/21 01/29/21 01/30/21





 06:59 06:59 06:59


 


Intake Total 1060 2866.9 316


 


Output Total  815 625


 


Balance 1060 2051.9 -309











Result Diagrams: 


                                 01/29/21 03:38





                                 01/29/21 03:38


Additional Labs: 


                                   Accuchecks











  01/28/21





  12:27


 


POC Glucose  133 H














Nephrology ROS





- Medication


Medications: 


Active Medications











Generic Name Dose Route Start Last Admin





  Trade Name Freq  PRN Reason Stop Dose Admin


 


Albuterol Sulfate  2 puff  01/28/21 01:00  01/29/21 06:38





  Albuterol 200 Puff (6.7gm Inhaler)  INH   2 puff





  [id number] SHIRLEY   Administration


 


Allopurinol  100 mg  01/28/21 09:00  01/29/21 10:04





  Allopurinol 100 Mg Tab  PO   100 mg





  DAILY SHIRLEY   Administration


 


Apixaban  5 mg  01/24/21 09:00  01/29/21 10:04





  Apixaban 5 Mg Tab  PO   5 mg





  BID SHIRLEY   Administration


 


Aspirin  81 mg  01/29/21 09:00  01/29/21 10:05





  Aspirin Chewable 81 Mg Tab  PO   81 mg





  DAILY SHIRLEY   Administration


 


Atorvastatin Calcium  40 mg  01/24/21 21:00  01/28/21 21:19





  Atorvastatin Calcium 40 Mg Tab  PO   40 mg





  HS SHIRLEY   Administration


 


Carvedilol  6.25 mg  01/24/21 08:00  01/29/21 08:41





  Carvedilol 6.25 Mg Tab  PO   Not Given





  BID- SHIRLEY  


 


Dexamethasone  6 mg  01/25/21 09:00  01/29/21 10:05





  Dexamethasone 4 Mg/Ml Vial  IVPB   6 mg





  DAILY SHIRLEY   Administration


 


Furosemide  40 mg  01/28/21 14:00  01/29/21 05:36





  Furosemide 40 Mg/4 Ml Vial  SLOW IVP   40 mg





  0600,1400 SHIRLEY   Administration


 


Cefepime HCl 1 gm/ Sodium  100 mls @ 200 mls/hr  01/27/21 17:00  01/29/21 05:36





  Chloride  IVPB   100 mls





  0500,1700 SHIRLEY   Administration


 


Doxycycline Hyclate 100 mg/  100 mls @ 100 mls/hr  01/28/21 08:00  01/29/21 

10:02





  Sodium Chloride  IVPB   100 mls





  0800,2000 SHIRLEY   Administration


 


Sodium Chloride  1,000 mls @ 10 mls/hr  01/29/21 11:45  01/29/21 12:07





  Normal Saline 0.9%  IV   Not Given





  .Q24H SHIRLEY  


 


Lorazepam  2 mg  01/28/21 13:30  01/28/21 17:36





  Lorazepam 2 Mg/Ml Vial  SLOW IVP  02/27/21 13:30  2 mg





  Q1H PRN   Administration





  Breakthrough agitation  


 


Pantoprazole Sodium  40 mg  01/29/21 09:00  01/29/21 10:05





  Pantoprazole 40 Mg Vial  IVP   40 mg





  DAILY SHIRLEY   Administration


 


Propofol  1,000 mg  01/28/21 13:30  01/29/21 04:32





  Propofol 1,000 Mg/100 Ml Vial  IV  02/27/21 13:30  1,000 mg





  INF PRN   Administration





  TO ACHIEVE GOAL RASS  





  Protocol  


 


Sodium Chloride  10 ml  01/24/21 09:00  01/29/21 10:05





  Flush - Normal Saline 10 Ml Syringe  IVF   10 ml





  Q12HR SHIRLEY   Administration


 


Sodium Chloride  10 ml  01/24/21 02:30  01/24/21 17:44





  Flush - Normal Saline 10 Ml Syringe  IVF   10 ml





  PRN PRN   Administration





  Saline Flush  














- Exam


General - other findings: sedated and unresponsive


ENT: normocephalic atraumatic


ENT - other findings: ET and OG tubes are in place


Respiratory - other findings: coarse ventilator transmitted sound noted


Cardiovascular: RRR


Extremities - other findings: bilateral feet fullness without overt edema


Neurological - other findings: sedated and unresponsive





Nephrology Results





- Labs


Result Diagrams: 


                                 01/29/21 03:38





                                 01/29/21 03:38


Lab results: 


                                        











WBC  13.0 thou/uL (4.8-10.8)  H  01/29/21  03:38    


 


Hgb  9.5 g/dL (14.0-18.0)  L  01/29/21  03:38    


 


Hct  30.1 % (42.0-52.0)  L  01/29/21  03:38    


 


MCV  83.6 fL (78.0-98.0)   01/29/21  03:38    


 


Plt Count  334 thou/uL (130-400)   01/29/21  03:38    


 


Neutrophils %  81.7 % (42.0-75.0)  H  01/29/21  03:38    


 


Band Neuts % (Manual)  1 % (5-11)  L  01/24/21  05:33    


 


ABG pH  7.60  (7.35-7.45)  H*  01/29/21  07:39    


 


ABG pCO2  25.1 mmHg (35.0-45.0)  L*  01/29/21  07:39    


 


ABG pO2  61.4 mmHg (> 70.0)   01/29/21  07:39    


 


VBG pH  7.42  (7.32-7.43)   01/28/21  06:30    


 


VBG pCO2  30.9 mmHg (42.0-51.0)  L  01/28/21  06:30    


 


VBG pO2  65.3 mmHg (35.0-45.0)  H  01/28/21  06:30    


 


Sodium  143 mmol/L (136-145)   01/29/21  03:38    


 


Potassium  4.3 mmol/L (3.5-5.1)   01/29/21  03:38    


 


Chloride  107 mmol/L ()   01/29/21  03:38    


 


Carbon Dioxide  24 mmol/L (23-31)   01/29/21  03:38    


 


BUN  67 mg/dL (8.4-25.7)  H  01/29/21  03:38    


 


Creatinine  2.35 mg/dL (0.7-1.3)  H  01/29/21  03:38    


 


Glucose  126 mg/dL ()  H  01/29/21  03:38    


 


Lactic Acid  1.7 mmol/L (0.5-2.2)   01/24/21  01:52    


 


Calcium  8.2 mg/dL (7.8-10.44)   01/29/21  03:38    


 


Total Bilirubin  0.8 mg/dL (0.2-1.2)   01/27/21  16:22    


 


AST  42 U/L (5-34)  H  01/27/21  16:22    


 


ALT  58 U/L (8-55)  H  01/27/21  16:22    


 


Alkaline Phosphatase  132 U/L ()  H  01/27/21  16:22    


 


CK-MB (CK-2)  1.4 ng/mL (0-6.6)   01/23/21  20:40    


 


Troponin I  0.245 ng/mL (< 0.028)  H  01/24/21  02:37    


 


C-Reactive Protein  12.35 mg/dL (= or < 0.5)  H  01/28/21  04:48    


 


B-Natriuretic Peptide  3710.8 pg/mL (0-100)  H  01/27/21  16:22    


 


Serum Total Protein  6.7 g/dL (5.8-8.1)   01/27/21  16:22    


 


Albumin  2.2 g/dL (3.4-4.8)  L  01/29/21  03:30    


 


Lipase  4 U/L (8-78)  L  01/28/21  04:48    


 


Urine Ketones  Negative mg/dL (Negative)   01/29/21  10:00    


 


Urine Blood  1+  (Negative)  A  01/29/21  10:00    


 


Urine Nitrite  Negative  (Negative)   01/29/21  10:00    


 


Ur Leukocyte Esterase  25 Shannon/uL (Negative)  A  01/29/21  10:00    


 


Urine RBC  11-20 HPF (0-3)  A  01/29/21  10:00    


 


Urine WBC  7-10 HPF (0-3)  A  01/29/21  10:00    


 


Ur Squamous Epith Cells  None Seen HPF (0-3)   01/29/21  10:00    


 


Urine Bacteria  1+ HPF (None Seen)  A  01/29/21  10:00    








                                        











Sodium  143 mmol/L (136-145)   01/29/21  03:38    


 


Potassium  4.3 mmol/L (3.5-5.1)   01/29/21  03:38    


 


Chloride  107 mmol/L ()   01/29/21  03:38    


 


Carbon Dioxide  24 mmol/L (23-31)   01/29/21  03:38    


 


Anion Gap  16 mmol/L (10-20)   01/29/21  03:38    


 


BUN  67 mg/dL (8.4-25.7)  H  01/29/21  03:38    


 


Creatinine  2.35 mg/dL (0.7-1.3)  H  01/29/21  03:38    


 


Glucose  126 mg/dL ()  H  01/29/21  03:38    


 


Calcium  8.2 mg/dL (7.8-10.44)   01/29/21  03:38    


 


Phosphorus  2.5 mg/dL (2.3-4.7)   01/28/21  04:48    


 


Phosphorus  Cancelled   01/28/21  04:48    


 


Albumin  2.2 g/dL (3.4-4.8)  L  01/29/21  03:30    














Nephrology AP PN





- Plan


MARIO: Due to hemodynamic factors related to hypotension, cardiac decompensation 

and diuretics/RAAS blocker 


CKD 3.


Hyperkalemia: Resolved.


Metabolic acidosis: Resolved with alkali therapy


Metabolic alkalosis: ? Volume contration.


Hypoalbuminemia


Acute respiratory failure requiring intubation


Cardiomyopathy








PLAN


DC alkali therapy


Give albumin.


Hold diuretic therapy.


Entresto held.


Get urine electrolytes and Urinalysis.


Follow electrolytes and renal function

## 2021-01-29 NOTE — PDOC.HOSPP
- Subjective


Encounter Date: 01/29/21


Encounter Time: 10:45


Subjective: 


is sedated, on vent





- Objective


Vital Signs & Weight: 


                             Vital Signs (12 hours)











  Temp Pulse Resp BP Pulse Ox


 


 01/29/21 14:38   57 L   


 


 01/29/21 13:54    25 H  


 


 01/29/21 12:00  98.0 F   23 H  


 


 01/29/21 10:30   59 L   


 


 01/29/21 10:00    22 H  


 


 01/29/21 08:41     108/56 L 


 


 01/29/21 08:37  97.9 F    


 


 01/29/21 08:00      98


 


 01/29/21 06:38   56 L   








                                     Weight











Admit Weight                   195 lb 3.408 oz


 


Weight                         90 lb 3.2 oz











                            Most Recent Monitor Data











Heart Rate from ECG            64


 


NIBP                           86/53


 


NIBP BP-Mean                   64


 


Respiration from ECG           29


 


SpO2                           100














I&O: 


                                        











 01/28/21 01/29/21 01/30/21





 06:59 06:59 06:59


 


Intake Total 1060 2866.9 416


 


Output Total  815 895


 


Balance 1060 2051.9 -479











Result Diagrams: 


                                 01/29/21 03:38





                                 01/29/21 03:38





Hospitalist ROS





- Medication


Medications: 


Active Medications











Generic Name Dose Route Start Last Admin





  Trade Name Freq  PRN Reason Stop Dose Admin


 


Albumin Human  25 gm  01/29/21 12:00  01/29/21 13:28





  Albumin 25% 25 Gm/100 Ml Bot  IVPB  01/29/21 20:01  25 gm





  0400,1200,2000 SHIRLEY   Administration


 


Albuterol Sulfate  2 puff  01/28/21 01:00  01/29/21 13:15





  Albuterol 200 Puff (6.7gm Inhaler)  INH   2 puff





  [id number] SHIRLEY   Administration


 


Allopurinol  100 mg  01/28/21 09:00  01/29/21 10:04





  Allopurinol 100 Mg Tab  PO   100 mg





  DAILY SHIRLEY   Administration


 


Apixaban  5 mg  01/24/21 09:00  01/29/21 10:04





  Apixaban 5 Mg Tab  PO   5 mg





  BID SHIRLEY   Administration


 


Aspirin  81 mg  01/29/21 09:00  01/29/21 10:05





  Aspirin Chewable 81 Mg Tab  PO   81 mg





  DAILY SHIRLEY   Administration


 


Atorvastatin Calcium  40 mg  01/24/21 21:00  01/28/21 21:19





  Atorvastatin Calcium 40 Mg Tab  PO   40 mg





  HS SHIRLEY   Administration


 


Carvedilol  6.25 mg  01/24/21 08:00  01/29/21 08:41





  Carvedilol 6.25 Mg Tab  PO   Not Given





  BID-WM SHIRLEY  


 


Dexamethasone  6 mg  01/25/21 09:00  01/29/21 10:05





  Dexamethasone 4 Mg/Ml Vial  IVPB   6 mg





  DAILY SHIRLEY   Administration


 


Furosemide  40 mg  01/28/21 14:00  01/29/21 05:36





  Furosemide 40 Mg/4 Ml Vial  SLOW IVP   40 mg





  0600,1400 SHIRLEY   Administration


 


Cefepime HCl 1 gm/ Sodium  100 mls @ 200 mls/hr  01/27/21 17:00  01/29/21 05:36





  Chloride  IVPB   100 mls





  0500,1700 SHIRLEY   Administration


 


Doxycycline Hyclate 100 mg/  100 mls @ 100 mls/hr  01/28/21 08:00  01/29/21 

10:02





  Sodium Chloride  IVPB   100 mls





  0800,2000 SHIRLEY   Administration


 


Sodium Chloride  1,000 mls @ 10 mls/hr  01/29/21 11:45  01/29/21 12:07





  Normal Saline 0.9%  IV   Not Given





  .Q24H SHIRLEY  


 


Lorazepam  2 mg  01/28/21 13:30  01/28/21 17:36





  Lorazepam 2 Mg/Ml Vial  SLOW IVP  02/27/21 13:30  2 mg





  Q1H PRN   Administration





  Breakthrough agitation  


 


Pantoprazole Sodium  40 mg  01/29/21 09:00  01/29/21 10:05





  Pantoprazole 40 Mg Vial  IVP   40 mg





  DAILY SHIRLEY   Administration


 


Propofol  1,000 mg  01/28/21 13:30  01/29/21 04:32





  Propofol 1,000 Mg/100 Ml Vial  IV  02/27/21 13:30  1,000 mg





  INF PRN   Administration





  TO ACHIEVE GOAL RASS  





  Protocol  


 


Sodium Chloride  10 ml  01/24/21 09:00  01/29/21 10:05





  Flush - Normal Saline 10 Ml Syringe  IVF   10 ml





  Q12HR SHIRLEY   Administration


 


Sodium Chloride  10 ml  01/24/21 02:30  01/24/21 17:44





  Flush - Normal Saline 10 Ml Syringe  IVF   10 ml





  PRN PRN   Administration





  Saline Flush  














Hospitalist Exam


Vitals: 


                             Vital Signs (12 hours)











  Temp Pulse Resp BP Pulse Ox


 


 01/29/21 14:38   57 L   


 


 01/29/21 13:54    25 H  


 


 01/29/21 12:00  98.0 F   23 H  


 


 01/29/21 10:30   59 L   


 


 01/29/21 10:00    22 H  


 


 01/29/21 08:41     108/56 L 


 


 01/29/21 08:37  97.9 F    


 


 01/29/21 08:00      98


 


 01/29/21 06:38   56 L   








                                     Weight











Admit Weight                   195 lb 3.408 oz


 


Weight                         90 lb 3.2 oz











                            Most Recent Monitor Data











Heart Rate from ECG            64


 


NIBP                           86/53


 


NIBP BP-Mean                   64


 


Respiration from ECG           29


 


SpO2                           100














General Appearance: ill appearing


Eye: PERRL, anicteric sclera


ENT: no oropharyngeal lesions, moist mucosa


Neck: supple, no JVD


Heart: RRR, no murmur


Respiratory: no wheezes, rales


Gastrointestinal: soft, non-tender, non-distended, normal bowel sounds


Extremities: no cyanosis, 1+ LE edema


Neurological: cranial nerve grossly intact, no focal deficits





Hosp A/P


(1) Acute respiratory failure with hypoxia and hypercarbia


Code(s): J96.01 - ACUTE RESPIRATORY FAILURE WITH HYPOXIA; J96.02 - ACUTE 

RESPIRATORY FAILURE WITH HYPERCAPNIA   Status: Acute   





(2) Acute on chronic systolic (congestive) heart failure


Code(s): I50.23 - ACUTE ON CHRONIC SYSTOLIC (CONGESTIVE) HEART FAILURE   Status:

Acute   





(3) Pneumonia


Code(s): J18.9 - PNEUMONIA, UNSPECIFIED ORGANISM   Status: Acute   


Qualifiers: 


   Pneumonia type: due to unspecified organism 





(4) Nonischemic cardiomyopathy


Code(s): I42.8 - OTHER CARDIOMYOPATHIES   Status: Chronic   





(5) Atrial fibrillation


Code(s): I48.91 - UNSPECIFIED ATRIAL FIBRILLATION   Status: Chronic   


Qualifiers: 


   Atrial fibrillation type: paroxysmal 





(6) GERD (gastroesophageal reflux disease)


Code(s): K21.9 - GASTRO-ESOPHAGEAL REFLUX DISEASE WITHOUT ESOPHAGITIS   Status: 

Chronic   


Qualifiers: 


   Esophagitis presence: without esophagitis   Qualified Code(s): K21.9 - 

Gastro-esophageal reflux disease without esophagitis   





(7) Hyperlipidemia


Code(s): E78.5 - HYPERLIPIDEMIA, UNSPECIFIED   Status: Chronic   


Qualifiers: 


 





(8) Hypertension


Code(s): I10 - ESSENTIAL (PRIMARY) HYPERTENSION   Status: Chronic   


Qualifiers: 


 





(9) Schizophrenia


Code(s): F20.9 - SCHIZOPHRENIA, UNSPECIFIED   Status: Chronic   


Qualifiers: 


   Schizophrenia type: unspecified   Qualified Code(s): F20.9 - Schizophrenia, 

unspecified   





(10) Type 2 diabetes mellitus


Status: Chronic   


Qualifiers: 


   Diabetes mellitus long term insulin use: without long term use 





(11) Hypocalcemia


Code(s): E83.51 - HYPOCALCEMIA   Status: Resolved   





(12) Hypokalemia


Code(s): E87.6 - HYPOKALEMIA   Status: Resolved   





- Plan





is off levophed drip, heart rate in the 50's, off bicarb drip, rate is 16 on 

vent this am.


he got intubated on 1/28


poor prognosis, has multiple med issues including non compliance and underlying 

schizoaffective disorder. 


Unclear if he has sustained anoxic brain injury.


contnue diuresis as tolerated with close monitoring of electrolytes


prior ef of 15%, likely is noncomplaint?


has nearly 6 covid pcr testing done all of which have been -ve, is presumed to 

have had covid with pulm infiltrates/fibrosis, on dexamethasone per ID advice.


continue eliquis, asp, lipitor, cefepime and doxy. 


has chronic lung changes with current volume overload.


I have give complete updates to his sister over phone 1/24, 1/25, 1/26, 1/27, 

1/28, she is aware of poor prognosis, code status was discussed with her, wants 

him to be full code (1/26).  is trying to find if he has any 

children who are in touch with him for next of kin, per sister he has many but 

none are in touch with him, they both live together and help each other.

## 2021-01-30 LAB
ANALYZER IN CARDIO: (no result)
ANION GAP SERPL CALC-SCNC: 14 MMOL/L (ref 10–20)
BASE EXCESS STD BLDA CALC-SCNC: 2.5 MEQ/L
BASOPHILS # BLD AUTO: 0 THOU/UL (ref 0–0.2)
BASOPHILS NFR BLD AUTO: 0.1 % (ref 0–1)
BUN SERPL-MCNC: 73 MG/DL (ref 8.4–25.7)
CA-I BLDA-SCNC: 1.13 MMOL/L (ref 1.12–1.3)
CALCIUM SERPL-MCNC: 8.6 MG/DL (ref 7.8–10.44)
CHLORIDE SERPL-SCNC: 108 MMOL/L (ref 98–107)
CO2 SERPL-SCNC: 25 MMOL/L (ref 23–31)
CREAT CL PREDICTED SERPL C-G-VRATE: 13 ML/MIN (ref 70–130)
EOSINOPHIL # BLD AUTO: 0 THOU/UL (ref 0–0.7)
EOSINOPHIL NFR BLD AUTO: 0.1 % (ref 0–10)
GLUCOSE SERPL-MCNC: 120 MG/DL (ref 83–110)
HCO3 BLDA-SCNC: 26.4 MEQ/L (ref 22–28)
HCT VFR BLDA CALC: 28 % (ref 42–52)
HGB BLD-MCNC: 8.8 G/DL (ref 14–18)
HGB BLDA-MCNC: 9.6 G/DL (ref 14–18)
LYMPHOCYTES # BLD: 0.9 THOU/UL (ref 1.2–3.4)
LYMPHOCYTES NFR BLD AUTO: 7.8 % (ref 21–51)
MCH RBC QN AUTO: 28 PG (ref 27–31)
MCV RBC AUTO: 84.8 FL (ref 78–98)
MONOCYTES # BLD AUTO: 0.9 THOU/UL (ref 0.11–0.59)
MONOCYTES NFR BLD AUTO: 8.2 % (ref 0–10)
NEUTROPHILS # BLD AUTO: 9.6 THOU/UL (ref 1.4–6.5)
NEUTROPHILS NFR BLD AUTO: 83.9 % (ref 42–75)
PCO2 BLDA: 38.2 MMHG (ref 35–45)
PH BLDA: 7.46 [PH] (ref 7.35–7.45)
PLATELET # BLD AUTO: 278 THOU/UL (ref 130–400)
PO2 BLDA: 65.5 MMHG (ref 70–?)
POTASSIUM BLD-SCNC: 4.13 MMOL/L (ref 3.7–5.3)
POTASSIUM SERPL-SCNC: 4.2 MMOL/L (ref 3.5–5.1)
RBC # BLD AUTO: 3.15 MILL/UL (ref 4.7–6.1)
SODIUM SERPL-SCNC: 143 MMOL/L (ref 136–145)
SPECIMEN DRAWN FROM PATIENT: (no result)
WBC # BLD AUTO: 11.4 THOU/UL (ref 4.8–10.8)

## 2021-01-30 RX ADMIN — ALBUTEROL SULFATE SCH PUFF: 108 INHALANT RESPIRATORY (INHALATION) at 07:01

## 2021-01-30 RX ADMIN — ASPIRIN 81 MG CHEWABLE TABLET SCH MG: 81 TABLET CHEWABLE at 09:35

## 2021-01-30 RX ADMIN — ALBUTEROL SULFATE SCH PUFF: 108 INHALANT RESPIRATORY (INHALATION) at 01:36

## 2021-01-30 RX ADMIN — Medication SCH ML: at 20:20

## 2021-01-30 RX ADMIN — ALBUTEROL SULFATE SCH PUFF: 108 INHALANT RESPIRATORY (INHALATION) at 13:00

## 2021-01-30 RX ADMIN — ALBUTEROL SULFATE SCH PUFF: 108 INHALANT RESPIRATORY (INHALATION) at 01:00

## 2021-01-30 RX ADMIN — GUAIFENESIN SCH MG: 200 SOLUTION ORAL at 20:20

## 2021-01-30 RX ADMIN — Medication SCH ML: at 09:35

## 2021-01-30 RX ADMIN — ALBUTEROL SULFATE SCH PUFF: 108 INHALANT RESPIRATORY (INHALATION) at 18:42

## 2021-01-30 RX ADMIN — GUAIFENESIN SCH MG: 200 SOLUTION ORAL at 10:02

## 2021-01-30 NOTE — RAD
RADIOGRAPH CHEST 1 VIEW:



DATE:

1/30/2021



TIME:

5:09 AM



HISTORY:

75-year-old male in respiratory distress



COMPARISON:

1/29/2021



FINDINGS:

Endotracheal tube and esophagogastric tube.

Diffuse bilateral mixed interstitial alveolar infiltrates.

No interval change.



IMPRESSION:

No interval change in extensive infiltrates



Reported By: Andrey Pagan 

Electronically Signed:  1/30/2021 3:54 PM

## 2021-01-30 NOTE — PDOC.HOSPP
- Subjective


Encounter Date: 01/30/21


Encounter Time: 08:00


Subjective: 


sedated, on vent





- Objective


Vital Signs & Weight: 


                             Vital Signs (12 hours)











  Temp Pulse Pulse Resp BP Pulse Ox


 


 01/30/21 14:54   60    


 


 01/30/21 12:00  98.8 F    19  


 


 01/30/21 10:44   66    


 


 01/30/21 10:00     22 H  


 


 01/30/21 09:35    61   112/60 


 


 01/30/21 08:00     25 H   98


 


 01/30/21 07:01   61    


 


 01/30/21 07:00  98.4 F     


 


 01/30/21 05:51     18  








                                     Weight











Admit Weight                   195 lb 3.408 oz


 


Weight                         89 lb











                            Most Recent Monitor Data











Heart Rate from ECG            62


 


NIBP                           104/59


 


NIBP BP-Mean                   74


 


Respiration from ECG           18


 


SpO2                           100














I&O: 


                                        











 01/29/21 01/30/21 01/31/21





 06:59 06:59 06:59


 


Intake Total 2866.9 1923 


 


Output Total 815 2020 525


 


Balance 2051.9 -97 -525











Result Diagrams: 


                                 01/30/21 03:05





                                 01/30/21 03:05





Hospitalist ROS





- Medication


Medications: 


Active Medications











Generic Name Dose Route Start Last Admin





  Trade Name Freq  PRN Reason Stop Dose Admin


 


Albuterol Sulfate  2 puff  01/28/21 01:00  01/30/21 13:00





  Albuterol 200 Puff (6.7gm Inhaler)  INH   2 puff





  [id number] SHIRLEY   Administration


 


Allopurinol  100 mg  01/28/21 09:00  01/30/21 09:35





  Allopurinol 100 Mg Tab  PO   100 mg





  DAILY SHIRLEY   Administration


 


Aspirin  81 mg  01/29/21 09:00  01/30/21 09:35





  Aspirin Chewable 81 Mg Tab  PO   81 mg





  DAILY SHIRLEY   Administration


 


Atorvastatin Calcium  40 mg  01/24/21 21:00  01/29/21 20:20





  Atorvastatin Calcium 40 Mg Tab  PO   40 mg





  HS SHIRLEY   Administration


 


Carvedilol  3.125 mg  01/29/21 17:00  01/30/21 16:25





  Carvedilol 3.125 Mg Tab  PO   Not Given





  BID-WM SHIRLEY  


 


Furosemide  40 mg  01/28/21 14:00  01/30/21 07:35





  Furosemide 40 Mg/4 Ml Vial  SLOW IVP   Not Given





  0600,1400 [facility]  


 


Guaifenesin  600 mg  01/30/21 21:00  01/30/21 10:02





  Guaifenesin Sf Soln 200 Mg/10 Ml Udcup  PER TUBE   600 mg





  BID SHIRLEY   Administration


 


Cefepime HCl 1 gm/ Sodium  100 mls @ 200 mls/hr  01/27/21 17:00  01/30/21 16:25





  Chloride  IVPB   100 mls





  0500,1700 SHIRLEY   Administration


 


Doxycycline Hyclate 100 mg/  100 mls @ 100 mls/hr  01/28/21 08:00  01/30/21 

09:34





  Sodium Chloride  IVPB   100 mls





  0800,2000 SHIRLEY   Administration


 


Fentanyl  100 mls @ 0 mls/hr  01/28/21 13:30  01/29/21 21:22





  Fentanyl Cadd  IV  02/27/21 13:30  100 mls





  INF SHIRLEY   Administration





  Protocol  





  Per Protocol  


 


Sodium Chloride  1,000 mls @ 10 mls/hr  01/29/21 11:45  01/30/21 13:31





  Normal Saline 0.9%  IV   Not Given





  .Q24H SHIRLEY  


 


Lorazepam  2 mg  01/28/21 13:30  01/28/21 17:36





  Lorazepam 2 Mg/Ml Vial  SLOW IVP  02/27/21 13:30  2 mg





  Q1H PRN   Administration





  Breakthrough agitation  


 


Metoclopramide HCl  10 mg  01/30/21 07:30  01/30/21 13:31





  Metoclopramide Hcl 10 Mg/2 Ml Vial  IVP   10 mg





  Q6H SHIRLEY   Administration


 


Pantoprazole Sodium  40 mg  01/29/21 09:00  01/30/21 09:34





  Pantoprazole 40 Mg Vial  IVP   40 mg





  DAILY SHIRLEY   Administration


 


Propofol  1,000 mg  01/28/21 13:30  01/30/21 01:18





  Propofol 1,000 Mg/100 Ml Vial  IV  02/27/21 13:30  1,000 mg





  INF PRN   Administration





  TO ACHIEVE GOAL RASS  





  Protocol  


 


Sodium Chloride  10 ml  01/24/21 09:00  01/30/21 09:35





  Flush - Normal Saline 10 Ml Syringe  IVF   10 ml





  Q12HR SHIRLEY   Administration


 


Sodium Chloride  10 ml  01/24/21 02:30  01/24/21 17:44





  Flush - Normal Saline 10 Ml Syringe  IVF   10 ml





  PRN PRN   Administration





  Saline Flush  














Hospitalist Exam


Vitals: 


                             Vital Signs (12 hours)











  Temp Pulse Pulse Resp BP Pulse Ox


 


 01/30/21 14:54   60    


 


 01/30/21 12:00  98.8 F    19  


 


 01/30/21 10:44   66    


 


 01/30/21 10:00     22 H  


 


 01/30/21 09:35    61   112/60 


 


 01/30/21 08:00     25 H   98


 


 01/30/21 07:01   61    


 


 01/30/21 07:00  98.4 F     


 


 01/30/21 05:51     18  








                                     Weight











Admit Weight                   195 lb 3.408 oz


 


Weight                         89 lb











                            Most Recent Monitor Data











Heart Rate from ECG            62


 


NIBP                           104/59


 


NIBP BP-Mean                   74


 


Respiration from ECG           18


 


SpO2                           100














General Appearance: ill appearing


Eye: PERRL, anicteric sclera


ENT: no oropharyngeal lesions, moist mucosa


Neck: supple, no JVD


Heart: no murmur, irregular


Respiratory: no wheezes, rales, rhonchi


Gastrointestinal: soft, non-tender, non-distended, normal bowel sounds


Extremities: no cyanosis, 1+ LE edema


Neurological: cranial nerve grossly intact, no focal deficits





Hosp A/P


(1) Acute respiratory failure with hypoxia and hypercarbia


Code(s): J96.01 - ACUTE RESPIRATORY FAILURE WITH HYPOXIA; J96.02 - ACUTE 

RESPIRATORY FAILURE WITH HYPERCAPNIA   Status: Acute   





(2) Acute on chronic systolic (congestive) heart failure


Code(s): I50.23 - ACUTE ON CHRONIC SYSTOLIC (CONGESTIVE) HEART FAILURE   Status:

Acute   





(3) Pneumonia


Code(s): J18.9 - PNEUMONIA, UNSPECIFIED ORGANISM   Status: Acute   


Qualifiers: 


   Pneumonia type: due to unspecified organism 





(4) Nonischemic cardiomyopathy


Code(s): I42.8 - OTHER CARDIOMYOPATHIES   Status: Chronic   





(5) Atrial fibrillation


Code(s): I48.91 - UNSPECIFIED ATRIAL FIBRILLATION   Status: Chronic   


Qualifiers: 


   Atrial fibrillation type: paroxysmal   Qualified Code(s): I48.0 - Paroxysmal 

atrial fibrillation   





(6) GERD (gastroesophageal reflux disease)


Code(s): K21.9 - GASTRO-ESOPHAGEAL REFLUX DISEASE WITHOUT ESOPHAGITIS   Status: 

Chronic   


Qualifiers: 


   Esophagitis presence: without esophagitis   Qualified Code(s): K21.9 - 

Gastro-esophageal reflux disease without esophagitis   





(7) Hyperlipidemia


Code(s): E78.5 - HYPERLIPIDEMIA, UNSPECIFIED   Status: Chronic   


Qualifiers: 


 





(8) Hypertension


Code(s): I10 - ESSENTIAL (PRIMARY) HYPERTENSION   Status: Chronic   


Qualifiers: 


 





(9) Schizophrenia


Code(s): F20.9 - SCHIZOPHRENIA, UNSPECIFIED   Status: Suspected   


Qualifiers: 


   Schizophrenia type: unspecified   Qualified Code(s): F20.9 - Schizophrenia, 

unspecified   





(10) Type 2 diabetes mellitus


Status: Chronic   


Qualifiers: 


   Diabetes mellitus long term insulin use: without long term use 





(11) Hypocalcemia


Code(s): E83.51 - HYPOCALCEMIA   Status: Resolved   





(12) Hypokalemia


Code(s): E87.6 - HYPOKALEMIA   Status: Resolved   





- Plan


electrolytes have been stable, junctional rhythm has been resolved off levophed.

No myoclonic jerks or seizures noted today, but patient is sedated


poor prognosis, has multiple med issues including non compliance and suspected 

underlying schizoaffective disorder. 


Unclear if he has sustained anoxic brain injury on top of above with today's 

code blue, although witnessed and prompt resucitative measures were done.


contnue diuresis as tolerated with close monitoring of electrolytes.


prior ef of 15%, likely is noncomplaint?


has nearly 6 covid pcr testing done all of which have been -ve, is presumed to 

have had covid with pulm infiltrates/fibrosis, on dexamethasone per ID advice.


continue eliquis, asp, lipitor.


has chronic lung changes with current volume overload.


I have given complete updates to niece including poor prognosis.

## 2021-01-30 NOTE — PDOC.CPN
- Subjective


Date: 01/30/21


Time: 15:44


Interval history: 





Remains sedated and intubated. 





- Review of Systems


ROS unobtainable: due to endotracheal tube





- Objective


Allergies/Adverse Reactions: 


                                    Allergies











Allergy/AdvReac Type Severity Reaction Status Date / Time


 


No Known Allergies Allergy   Verified 10/19/20 12:31











Visit Medications: 


                               Current Medications





Acetaminophen (Acetaminophen 325 Mg Tab)  650 mg PO Q4H PRN


   PRN Reason: Headache/Fever/Mild Pain (1-3)


Acetaminophen (Acetaminophen 650 Mg Suppository)  650 mg [place] Q4H PRN


   PRN Reason: Headache/Fever/Mild Pain (1-3)


Albuterol Sulfate (Albuterol 200 Puff (6.7gm Inhaler))  2 puff INH [id number] [facility]


   Last Admin: 01/30/21 13:00 Dose:  2 puff


   Documented by: 


Allopurinol (Allopurinol 100 Mg Tab)  100 mg PO DAILY [facility]


   Last Admin: 01/30/21 09:35 Dose:  100 mg


   Documented by: 


Apixaban (Apixaban 5 Mg Tab)  5 mg PO BID [facility]


   Last Admin: 01/30/21 09:35 Dose:  5 mg


   Documented by: 


Aspirin (Aspirin Chewable 81 Mg Tab)  81 mg PO DAILY [facility]


   Last Admin: 01/30/21 09:35 Dose:  81 mg


   Documented by: 


Atorvastatin Calcium (Atorvastatin Calcium 40 Mg Tab)  40 mg PO HS [facility]


   Last Admin: 01/29/21 20:20 Dose:  40 mg


   Documented by: 


Calcium Carbonate (Calcium Carbonate 500 Mg Chewtab)  1,000 mg PO Q4H PRN


   PRN Reason: Heartburn  or Indigestion


Carvedilol (Carvedilol 3.125 Mg Tab)  3.125 mg PO BID-WM [facility]


   Last Admin: 01/30/21 09:35 Dose:  Not Given


   Documented by: 


Furosemide (Furosemide 40 Mg/4 Ml Vial)  40 mg SLOW IVP 0600,1400 [facility]


   Last Admin: 01/30/21 07:35 Dose:  Not Given


   Documented by: 


Guaifenesin (Guaifenesin Sf Soln 200 Mg/10 Ml Udcup)  600 mg PER TUBE BID [facility]


   Last Admin: 01/30/21 10:02 Dose:  600 mg


   Documented by: 


Cefepime HCl 1 gm/ Sodium (Chloride)  100 mls @ 200 mls/hr IVPB 0500,1700 [facility]


   Last Admin: 01/30/21 05:22 Dose:  100 mls


   Documented by: 


Doxycycline Hyclate 100 mg/ (Sodium Chloride)  100 mls @ 100 mls/hr IVPB 

0800,2000 [facility]


   Last Admin: 01/30/21 09:34 Dose:  100 mls


   Documented by: 


Fentanyl (Fentanyl Cadd)  100 mls @ 0 mls/hr IV INF [facility]; Protocol


   Stop: 02/27/21 13:30


   Last Admin: 01/29/21 21:22 Dose:  100 mls


   Documented by: 


Fentanyl Citrate (Fentanyl Bolus)  250 mls @ 0 mls/hr IVPB PRN PRN


   PRN Reason: Breakthrough pain/agitation


   Stop: 02/27/21 13:30


Sodium Chloride (Normal Saline 0.9%)  1,000 mls @ 10 mls/hr IV .Q24H [facility]


   Last Admin: 01/30/21 13:31 Dose:  Not Given


   Documented by: 


Lorazepam (Lorazepam 2 Mg/Ml Vial)  2 mg SLOW IVP Q1H PRN


   PRN Reason: Breakthrough agitation


   Stop: 02/27/21 13:30


   Last Admin: 01/28/21 17:36 Dose:  2 mg


   Documented by: 


Metoclopramide HCl (Metoclopramide Hcl 10 Mg/2 Ml Vial)  10 mg IVP Q6H [facility]


   Last Admin: 01/30/21 13:31 Dose:  10 mg


   Documented by: 


Miscellaneous Medication (Electrolyte Replacement Protocol)  0 each FS ASDIR 

PRN; Protocol


   PRN Reason: ELECTROLYTE REPLACEMENT


Morphine Sulfate (Morphine 2 Mg/Ml Vial)  2 mg SLOW IVP Q1H PRN


   PRN Reason: Breakthrough Pain/Agitation


   Stop: 02/27/21 13:30


Pantoprazole Sodium (Pantoprazole 40 Mg Vial)  40 mg IVP DAILY [facility]


   Last Admin: 01/30/21 09:34 Dose:  40 mg


   Documented by: 


Propofol (Propofol 1,000 Mg/100 Ml Vial)  1,000 mg IV INF PRN; Protocol


   PRN Reason: TO ACHIEVE GOAL RASS


   Stop: 02/27/21 13:30


   Last Admin: 01/30/21 01:18 Dose:  1,000 mg


   Documented by: 


Propofol (Propofol Bolus 1,000 Mg/100 Ml Vial)  20 mg IV Q5MIN PRN


   PRN Reason: BREAKTHROUGH AGITATION


   Stop: 02/27/21 13:30


Sodium Chloride (Flush - Normal Saline 10 Ml Syringe)  10 ml IVF Q12HR [facility]


   Last Admin: 01/30/21 09:35 Dose:  10 ml


   Documented by: 


Sodium Chloride (Flush - Normal Saline 10 Ml Syringe)  10 ml IVF PRN PRN


   PRN Reason: Saline Flush


   Last Admin: 01/24/21 17:44 Dose:  10 ml


   Documented by: 


Sodium Chloride (Sodium Chloride 0.9% (Pf) 10 Ml Vial)  10 ml FS PRN PRN


   PRN Reason: RECONSTITUTION








Vital Signs & Weight: 


                                   Vital Signs











  Temp Pulse Pulse Resp BP Pulse Ox


 


 01/30/21 14:54   60    


 


 01/30/21 12:00  98.8 F    19  


 


 01/30/21 10:44   66    


 


 01/30/21 10:00     22 H  


 


 01/30/21 09:35    61   112/60 


 


 01/30/21 08:00     25 H   98


 


 01/30/21 07:01   61    


 


 01/30/21 07:00  98.4 F     


 


 01/30/21 05:51     18  


 


 01/30/21 04:00  98.7 F    21 H  








                                        











Admit Weight                   195 lb 3.408 oz


 


Weight                         89 lb

















- Physical Exam


General: other (S/I)


HEENT: normocephaly


Neck: supple neck


Cardiac: regular rate and rhythm


Lungs: clear to auscultation


Neuro: no lateralizing findings


Abdomen: active bowel sounds


Extremities: no edema


Skin: clear


Musculoskeletal: normal range of motion





- Labs


Result Diagrams: 


                                 01/30/21 03:05





                                 01/30/21 03:05


                                  Troponin/CKMB











CK-MB (CK-2)  1.4 ng/mL (0-6.6)   01/23/21  20:40    


 


Troponin I  0.245 ng/mL (< 0.028)  H  01/24/21  02:37    














- Telemetry


Sinus rhythms and dysrhythmias: sinus rhythm





- Assessment/Plan


Assessment/Plan: 





1. Post Cardiopulomnary arrest


2. Non ischemic CM EF at 15-20%


3. Likely late COVID -19 infection presentation


4. Paroxysmal afib





PLAN


- Continue supportive care.


- Poor long term prognosis.

## 2021-01-30 NOTE — PDOC.NEPPN
- Subjective


Encounter Date: 01/30/21


Subjective: 


Seen and examined. Still intubated. Urine output improved greatly with albumin 

infusion





- Objective


Vital Signs & Weight: 


                             Vital Signs (12 hours)











  Temp Pulse Resp Pulse Ox


 


 01/30/21 08:00    25 H  98


 


 01/30/21 07:01   61  


 


 01/30/21 07:00  98.4 F   


 


 01/30/21 05:51    18 


 


 01/30/21 04:00  98.7 F   21 H 


 


 01/30/21 03:08   57 L  


 


 01/30/21 02:00    15 


 


 01/30/21 00:00  97.6 F   


 


 01/29/21 23:59    16 


 


 01/29/21 23:00  97.7 F   








                                     Weight











Admit Weight                   195 lb 3.408 oz


 


Weight                         89 lb











                            Most Recent Monitor Data











Heart Rate from ECG            60


 


NIBP                           112/57


 


NIBP BP-Mean                   75


 


Respiration from ECG           13


 


SpO2                           94














I&O: 


                                        











 01/29/21 01/30/21 01/31/21





 06:59 06:59 06:59


 


Intake Total 2866.9 1923 


 


Output Total 815 2020 250


 


Balance 2051.9 -97 -250











Result Diagrams: 


                                 01/30/21 03:05





                                 01/30/21 03:05





Nephrology ROS





- Medication


Medications: 


Active Medications











Generic Name Dose Route Start Last Admin





  Trade Name Freq  PRN Reason Stop Dose Admin


 


Albuterol Sulfate  2 puff  01/28/21 01:00  01/30/21 07:01





  Albuterol 200 Puff (6.7gm Inhaler)  INH   2 puff





  [id number] [facility]LEY   Administration


 


Allopurinol  100 mg  01/28/21 09:00  01/30/21 09:35





  Allopurinol 100 Mg Tab  PO   100 mg





  DAILY [facility]   Administration


 


Apixaban  5 mg  01/24/21 09:00  01/30/21 09:35





  Apixaban 5 Mg Tab  PO   5 mg





  BID [facility]   Administration


 


Aspirin  81 mg  01/29/21 09:00  01/30/21 09:35





  Aspirin Chewable 81 Mg Tab  PO   81 mg





  DAILY [facility]   Administration


 


Atorvastatin Calcium  40 mg  01/24/21 21:00  01/29/21 20:20





  Atorvastatin Calcium 40 Mg Tab  PO   40 mg





  HS [facility]   Administration


 


Carvedilol  3.125 mg  01/29/21 17:00  01/30/21 09:35





  Carvedilol 3.125 Mg Tab  PO   Not Given





  BID-WM [facility]  


 


Furosemide  40 mg  01/28/21 14:00  01/30/21 07:35





  Furosemide 40 Mg/4 Ml Vial  SLOW IVP   Not Given





  0600,1400 [facility]LEY  


 


Guaifenesin  600 mg  01/30/21 21:00  01/30/21 10:02





  Guaifenesin Sf Soln 200 Mg/10 Ml Udcup  PER TUBE   600 mg





  BID [facility]   Administration


 


Cefepime HCl 1 gm/ Sodium  100 mls @ 200 mls/hr  01/27/21 17:00  01/30/21 05:22





  Chloride  IVPB   100 mls





  0500,1700 [facility]   Administration


 


Doxycycline Hyclate 100 mg/  100 mls @ 100 mls/hr  01/28/21 08:00  01/30/21 

09:34





  Sodium Chloride  IVPB   100 mls





  0800,2000 [facility]   Administration


 


Fentanyl  100 mls @ 0 mls/hr  01/28/21 13:30  01/29/21 21:22





  Fentanyl Cadd  IV  02/27/21 13:30  100 mls





  INF [facility]   Administration





  Protocol  





  Per Protocol  


 


Sodium Chloride  1,000 mls @ 10 mls/hr  01/29/21 11:45  01/29/21 12:07





  Normal Saline 0.9%  IV   Not Given





  .Q24H [facility]  


 


Lorazepam  2 mg  01/28/21 13:30  01/28/21 17:36





  Lorazepam 2 Mg/Ml Vial  SLOW IVP  02/27/21 13:30  2 mg





  Q1H PRN   Administration





  Breakthrough agitation  


 


Metoclopramide HCl  10 mg  01/30/21 07:30  01/30/21 09:34





  Metoclopramide Hcl 10 Mg/2 Ml Vial  IVP   10 mg





  Q6H [facility]   Administration


 


Pantoprazole Sodium  40 mg  01/29/21 09:00  01/30/21 09:34





  Pantoprazole 40 Mg Vial  IVP   40 mg





  DAILY [facility]   Administration


 


Propofol  1,000 mg  01/28/21 13:30  01/30/21 01:18





  Propofol 1,000 Mg/100 Ml Vial  IV  02/27/21 13:30  1,000 mg





  INF PRN   Administration





  TO ACHIEVE GOAL RASS  





  Protocol  


 


Sodium Chloride  10 ml  01/24/21 09:00  01/30/21 09:35





  Flush - Normal Saline 10 Ml Syringe  IVF   10 ml





  Q12HR [facility]   Administration


 


Sodium Chloride  10 ml  01/24/21 02:30  01/24/21 17:44





  Flush - Normal Saline 10 Ml Syringe  IVF   10 ml





  PRN PRN   Administration





  Saline Flush  














- Exam


General - other findings: sedated


Eye: anicteric sclera


ENT: normocephalic atraumatic


ENT - other findings: ET tube in place


Respiratory - other findings: ventilator transmitted sound noted


Cardiovascular: RRR


Gastrointestinal: soft, non-distended, normal bowel sounds


Gastrointestinal - other findings: Preciado catheter in place


Extremities - other findings: bilateral feet fullness but no overt edema


Neurological - other findings: sedated 





Nephrology Results





- Labs


Result Diagrams: 


                                 01/30/21 03:05





                                 01/30/21 03:05


Lab results: 


                                        











WBC  11.4 thou/uL (4.8-10.8)  H  01/30/21  03:05    


 


Hgb  8.8 g/dL (14.0-18.0)  L  01/30/21  03:05    


 


Hct  26.7 % (42.0-52.0)  L  01/30/21  03:05    


 


MCV  84.8 fL (78.0-98.0)   01/30/21  03:05    


 


Plt Count  278 thou/uL (130-400)   01/30/21  03:05    


 


Neutrophils %  83.9 % (42.0-75.0)  H  01/30/21  03:05    


 


Band Neuts % (Manual)  1 % (5-11)  L  01/24/21  05:33    


 


ABG pH  7.46  (7.35-7.45)  H  01/30/21  06:49    


 


ABG pCO2  38.2 mmHg (35.0-45.0)   01/30/21  06:49    


 


ABG pO2  65.5 mmHg (> 70.0)   01/30/21  06:49    


 


VBG pH  7.42  (7.32-7.43)   01/28/21  06:30    


 


VBG pCO2  30.9 mmHg (42.0-51.0)  L  01/28/21  06:30    


 


VBG pO2  65.3 mmHg (35.0-45.0)  H  01/28/21  06:30    


 


Sodium  143 mmol/L (136-145)   01/30/21  03:05    


 


Potassium  4.2 mmol/L (3.5-5.1)   01/30/21  03:05    


 


Chloride  108 mmol/L ()  H  01/30/21  03:05    


 


Carbon Dioxide  25 mmol/L (23-31)   01/30/21  03:05    


 


BUN  73 mg/dL (8.4-25.7)  H  01/30/21  03:05    


 


Creatinine  2.74 mg/dL (0.7-1.3)  H  01/30/21  03:05    


 


Glucose  120 mg/dL ()  H  01/30/21  03:05    


 


Lactic Acid  1.7 mmol/L (0.5-2.2)   01/24/21  01:52    


 


Calcium  8.6 mg/dL (7.8-10.44)   01/30/21  03:05    


 


Total Bilirubin  0.8 mg/dL (0.2-1.2)   01/27/21  16:22    


 


AST  42 U/L (5-34)  H  01/27/21  16:22    


 


ALT  58 U/L (8-55)  H  01/27/21  16:22    


 


Alkaline Phosphatase  132 U/L ()  H  01/27/21  16:22    


 


CK-MB (CK-2)  1.4 ng/mL (0-6.6)   01/23/21  20:40    


 


Troponin I  0.245 ng/mL (< 0.028)  H  01/24/21  02:37    


 


C-Reactive Protein  12.35 mg/dL (= or < 0.5)  H  01/28/21  04:48    


 


B-Natriuretic Peptide  3710.8 pg/mL (0-100)  H  01/27/21  16:22    


 


Serum Total Protein  6.7 g/dL (5.8-8.1)   01/27/21  16:22    


 


Albumin  2.8 g/dL (3.4-4.8)  L  01/30/21  03:05    


 


Lipase  4 U/L (8-78)  L  01/28/21  04:48    


 


Urine Ketones  Negative mg/dL (Negative)   01/29/21  10:00    


 


Urine Blood  1+  (Negative)  A  01/29/21  10:00    


 


Urine Nitrite  Negative  (Negative)   01/29/21  10:00    


 


Ur Leukocyte Esterase  25 Shannon/uL (Negative)  A  01/29/21  10:00    


 


Urine RBC  11-20 HPF (0-3)  A  01/29/21  10:00    


 


Urine WBC  7-10 HPF (0-3)  A  01/29/21  10:00    


 


Ur Squamous Epith Cells  None Seen HPF (0-3)   01/29/21  10:00    


 


Urine Bacteria  1+ HPF (None Seen)  A  01/29/21  10:00    








                                        











Sodium  143 mmol/L (136-145)   01/30/21  03:05    


 


Potassium  4.2 mmol/L (3.5-5.1)   01/30/21  03:05    


 


Chloride  108 mmol/L ()  H  01/30/21  03:05    


 


Carbon Dioxide  25 mmol/L (23-31)   01/30/21  03:05    


 


Anion Gap  14 mmol/L (10-20)   01/30/21  03:05    


 


BUN  73 mg/dL (8.4-25.7)  H  01/30/21  03:05    


 


Creatinine  2.74 mg/dL (0.7-1.3)  H  01/30/21  03:05    


 


Glucose  120 mg/dL ()  H  01/30/21  03:05    


 


Calcium  8.6 mg/dL (7.8-10.44)   01/30/21  03:05    


 


Phosphorus  2.5 mg/dL (2.3-4.7)   01/28/21  04:48    


 


Phosphorus  Cancelled   01/28/21  04:48    


 


Albumin  2.8 g/dL (3.4-4.8)  L  01/30/21  03:05    














Nephrology AP PN





- Plan


MARIO: Due to hemodynamic factors related to hypotension, cardiac decompensation 

and diuretics/RAAS blocker. Creat continues to trend up. Recieved lasix 

yesterday morning. Cardiorenal syndrome remained a concern.


CKD 3.


Hyperkalemia: Resolved.


Metabolic acidosis: Resolved with alkali therapy


Metabolic alkalosis: Due to Volume contraction. Resolved with albumin.


Hypoalbuminemia


Acute respiratory failure requiring intubation


Cardiomyopathy with EF of 15%


Pulmonary infiltrates. ? Pulmonaray edema from CHF vs infective process. Has 

remained afebrile and Covid remained negative. Normal procalcitonin makes 

pulmonary congestion from CHF exacerbation more likely








PLAN


Give another dose of albumin.


Continue to hold diuretic therapy as urine output has improved greatly.


Avoid all overt and potentially nephrotic agents including Entresto.


Will make a case for ionotropic agent if creat continue to trend up by tomorrow 

and pulmonary infiltrates persist


Get urine electrolytes and Urinalysis


Follow electrolytes and renal function

## 2021-01-31 LAB
ALBUMIN SERPL BCG-MCNC: 3.1 G/DL (ref 3.4–4.8)
ALP SERPL-CCNC: 130 U/L (ref 40–110)
ALT SERPL W P-5'-P-CCNC: 96 U/L (ref 8–55)
ANALYZER IN CARDIO: (no result)
ANION GAP SERPL CALC-SCNC: 15 MMOL/L (ref 10–20)
AST SERPL-CCNC: 49 U/L (ref 5–34)
BASE EXCESS STD BLDA CALC-SCNC: 2.5 MEQ/L
BASOPHILS # BLD AUTO: 0 THOU/UL (ref 0–0.2)
BASOPHILS NFR BLD AUTO: 0.1 % (ref 0–1)
BILIRUB DIRECT SERPL-MCNC: 0.6 MG/DL (ref 0.1–0.3)
BILIRUB SERPL-MCNC: 0.7 MG/DL (ref 0.2–1.2)
BUN SERPL-MCNC: 82 MG/DL (ref 8.4–25.7)
CA-I BLDA-SCNC: 1.16 MMOL/L (ref 1.12–1.3)
CALCIUM SERPL-MCNC: 8.9 MG/DL (ref 7.8–10.44)
CHLORIDE SERPL-SCNC: 109 MMOL/L (ref 98–107)
CO2 SERPL-SCNC: 28 MMOL/L (ref 23–31)
CREAT CL PREDICTED SERPL C-G-VRATE: 11 ML/MIN (ref 70–130)
EOSINOPHIL # BLD AUTO: 0.7 THOU/UL (ref 0–0.7)
EOSINOPHIL NFR BLD AUTO: 5.1 % (ref 0–10)
GLUCOSE SERPL-MCNC: 93 MG/DL (ref 83–110)
HCO3 BLDA-SCNC: 27.9 MEQ/L (ref 22–28)
HCT VFR BLDA CALC: 29 % (ref 42–52)
HGB BLD-MCNC: 9.4 G/DL (ref 14–18)
HGB BLDA-MCNC: 9.9 G/DL (ref 14–18)
LYMPHOCYTES # BLD: 1.2 THOU/UL (ref 1.2–3.4)
LYMPHOCYTES NFR BLD AUTO: 8.8 % (ref 21–51)
MCH RBC QN AUTO: 27.6 PG (ref 27–31)
MCV RBC AUTO: 85.8 FL (ref 78–98)
MDIFF COMPLETE?: YES
MONOCYTES # BLD AUTO: 1.2 THOU/UL (ref 0.11–0.59)
MONOCYTES NFR BLD AUTO: 8.6 % (ref 0–10)
NEUTROPHILS # BLD AUTO: 10.6 THOU/UL (ref 1.4–6.5)
NEUTROPHILS NFR BLD AUTO: 77.4 % (ref 42–75)
O2 A-A PPRESDIFF RESPIRATORY: 173.03 MMHG (ref 0–20)
PCO2 BLDA: 46.7 MMHG (ref 35–45)
PH BLDA: 7.39 [PH] (ref 7.35–7.45)
PLATELET # BLD AUTO: 291 THOU/UL (ref 130–400)
PO2 BLDA: 53.8 MMHG (ref 70–?)
POTASSIUM BLD-SCNC: 4.58 MMOL/L (ref 3.7–5.3)
POTASSIUM SERPL-SCNC: 4.6 MMOL/L (ref 3.5–5.1)
RBC # BLD AUTO: 3.42 MILL/UL (ref 4.7–6.1)
SODIUM SERPL-SCNC: 147 MMOL/L (ref 136–145)
SPECIMEN DRAWN FROM PATIENT: (no result)
TARGETS BLD QL SMEAR: (no result) (100X)
WBC # BLD AUTO: 13.7 THOU/UL (ref 4.8–10.8)

## 2021-01-31 RX ADMIN — Medication SCH ML: at 23:14

## 2021-01-31 RX ADMIN — Medication SCH ML: at 08:44

## 2021-01-31 RX ADMIN — GUAIFENESIN SCH MG: 200 SOLUTION ORAL at 09:59

## 2021-01-31 RX ADMIN — Medication SCH MLS: at 23:45

## 2021-01-31 RX ADMIN — ALBUMIN HUMAN SCH GM: 250 SOLUTION INTRAVENOUS at 08:42

## 2021-01-31 RX ADMIN — ALBUTEROL SULFATE SCH PUFF: 108 INHALANT RESPIRATORY (INHALATION) at 19:16

## 2021-01-31 RX ADMIN — DOBUTAMINE HYDROCHLORIDE SCH MLS: 200 INJECTION INTRAVENOUS at 13:47

## 2021-01-31 RX ADMIN — GUAIFENESIN SCH MG: 200 SOLUTION ORAL at 20:26

## 2021-01-31 RX ADMIN — ALBUTEROL SULFATE SCH PUFF: 108 INHALANT RESPIRATORY (INHALATION) at 07:53

## 2021-01-31 RX ADMIN — ASPIRIN 81 MG CHEWABLE TABLET SCH MG: 81 TABLET CHEWABLE at 08:41

## 2021-01-31 RX ADMIN — ALBUTEROL SULFATE SCH PUFF: 108 INHALANT RESPIRATORY (INHALATION) at 13:07

## 2021-01-31 NOTE — PDOC.CPN
- Subjective


Date: 01/31/21


Time: 15:04


Interval history: 





Remains sedated and intubated. 





- Review of Systems


ROS unobtainable: due to endotracheal tube





- Objective


Allergies/Adverse Reactions: 


                                    Allergies











Allergy/AdvReac Type Severity Reaction Status Date / Time


 


No Known Allergies Allergy   Verified 10/19/20 12:31











Visit Medications: 


                               Current Medications





Acetaminophen (Acetaminophen 325 Mg Tab)  650 mg PO Q4H PRN


   PRN Reason: Headache/Fever/Mild Pain (1-3)


Acetaminophen (Acetaminophen 650 Mg Suppository)  650 mg VT Q4H PRN


   PRN Reason: Headache/Fever/Mild Pain (1-3)


Albumin Human (Albumin 25% 25 Gm/100 Ml Bot)  50 gm IVPB NOW [facility]


   Stop: 02/01/21 06:55


   Last Admin: 01/31/21 08:42 Dose:  50 gm


   Documented by: 


Albuterol Sulfate (Albuterol 200 Puff (6.7gm Inhaler))  2 puff INH [id number] [facility]


   Last Admin: 01/31/21 13:07 Dose:  2 puff


   Documented by: 


Allopurinol (Allopurinol 100 Mg Tab)  100 mg PO DAILY [facility]


   Last Admin: 01/31/21 08:42 Dose:  100 mg


   Documented by: 


Apixaban (Apixaban 2.5 Mg Tab)  2.5 mg PO BID [facility]


   Last Admin: 01/31/21 08:41 Dose:  2.5 mg


   Documented by: 


Aspirin (Aspirin Chewable 81 Mg Tab)  81 mg PO DAILY [facility]


   Last Admin: 01/31/21 08:41 Dose:  81 mg


   Documented by: 


Atorvastatin Calcium (Atorvastatin Calcium 40 Mg Tab)  40 mg PO [facility]


   Last Admin: 01/30/21 20:20 Dose:  40 mg


   Documented by: 


Calcium Carbonate (Calcium Carbonate 500 Mg Chewtab)  1,000 mg PO Q4H PRN


   PRN Reason: Heartburn  or Indigestion


Carvedilol (Carvedilol 3.125 Mg Tab)  3.125 mg PO BID-[facility]


   Last Admin: 01/31/21 08:41 Dose:  3.125 mg


   Documented by: 


Guaifenesin (Guaifenesin Sf Soln 200 Mg/10 Ml Udcup)  600 mg PER TUBE BID [facility]


   Last Admin: 01/31/21 09:59 Dose:  600 mg


   Documented by: 


Doxycycline Hyclate 100 mg/ (Sodium Chloride)  100 mls @ 100 mls/hr IVPB 

0800,2000 [facility]


   Last Admin: 01/31/21 08:42 Dose:  100 mls


   Documented by: 


Fentanyl (Fentanyl Cadd)  100 mls @ 0 mls/hr IV INF [facility]; Protocol


   Stop: 02/27/21 13:30


   Last Admin: 01/29/21 21:22 Dose:  100 mls


   Documented by: 


Fentanyl Citrate (Fentanyl Bolus)  250 mls @ 0 mls/hr IVPB PRN PRN


   PRN Reason: Breakthrough pain/agitation


   Stop: 02/27/21 13:30


Sodium Chloride (Normal Saline 0.9%)  1,000 mls @ 10 mls/hr IV .Q24H [facility]


   Last Admin: 01/31/21 10:25 Dose:  Not Given


   Documented by: 


Cefepime HCl 1 gm/ Sodium (Chloride)  100 mls @ 200 mls/hr IVPB DAILY [facility]


   Last Admin: 01/31/21 08:42 Dose:  100 mls


   Documented by: 


Norepinephrine Bitartrate (Levophed)  250 mls @ 0 mls/hr IVPB INF [facility]; Protocol


Dobutamine HCl/Dextrose 500 mg (/ Device)  250 mls @ 0 mls/hr IVPB INF [facility]; 

Protocol


   Last Admin: 01/31/21 13:47 Dose:  250 mls


   Documented by: 


Lorazepam (Lorazepam 2 Mg/Ml Vial)  2 mg SLOW IVP Q1H PRN


   PRN Reason: Breakthrough agitation


   Stop: 02/27/21 13:30


   Last Admin: 01/28/21 17:36 Dose:  2 mg


   Documented by: 


Metoclopramide HCl (Metoclopramide Hcl 10 Mg/2 Ml Vial)  10 mg IVP Q6H [facility]


   Last Admin: 01/31/21 12:37 Dose:  10 mg


   Documented by: 


Miscellaneous Medication (Electrolyte Replacement Protocol)  0 each FS ASDIR 

PRN; Protocol


   PRN Reason: ELECTROLYTE REPLACEMENT


Morphine Sulfate (Morphine 2 Mg/Ml Vial)  2 mg SLOW IVP Q1H PRN


   PRN Reason: Breakthrough Pain/Agitation


   Stop: 02/27/21 13:30


Pantoprazole Sodium (Pantoprazole 40 Mg Vial)  40 mg IVP DAILY [facility]


   Last Admin: 01/31/21 08:42 Dose:  40 mg


   Documented by: 


Propofol (Propofol 1,000 Mg/100 Ml Vial)  1,000 mg IV INF PRN; Protocol


   PRN Reason: TO ACHIEVE GOAL RASS


   Stop: 02/27/21 13:30


   Last Admin: 01/31/21 12:39 Dose:  1,000 mg


   Documented by: 


Propofol (Propofol Bolus 1,000 Mg/100 Ml Vial)  20 mg IV Q5MIN PRN


   PRN Reason: BREAKTHROUGH AGITATION


   Stop: 02/27/21 13:30


Sodium Chloride (Flush - Normal Saline 10 Ml Syringe)  10 ml IVF Q12HR [facility]


   Last Admin: 01/31/21 08:44 Dose:  10 ml


   Documented by: 


Sodium Chloride (Flush - Normal Saline 10 Ml Syringe)  10 ml IVF PRN PRN


   PRN Reason: Saline Flush


   Last Admin: 01/24/21 17:44 Dose:  10 ml


   Documented by: 


Sodium Chloride (Sodium Chloride 0.9% (Pf) 10 Ml Vial)  10 ml FS PRN PRN


   PRN Reason: RECONSTITUTION








Vital Signs & Weight: 


                                   Vital Signs











  Temp Pulse Resp BP Pulse Ox


 


 01/31/21 14:28   74   120/62 


 


 01/31/21 14:00    17  


 


 01/31/21 13:08   64   94/51 L 


 


 01/31/21 11:55    19  


 


 01/31/21 10:34   65   


 


 01/31/21 10:00    17  


 


 01/31/21 08:00      89 L


 


 01/31/21 07:53   65   103/62 


 


 01/31/21 07:44    19  


 


 01/31/21 06:00    16  


 


 01/31/21 05:00  98.2 F    


 


 01/31/21 04:00    24 H  


 


 01/31/21 03:26   64   








                                        











Admit Weight                   195 lb 3.408 oz


 


Weight                         195 lb

















- Physical Exam


General: other (S/I)


HEENT: normocephaly


Neck: midline trachea


Cardiac: regular rate and rhythm


Lungs: normal breath sounds


Neuro: no lateralizing findings


Abdomen: active bowel sounds


Extremities: no edema


Skin: clear


Musculoskeletal: normal range of motion





- Labs


Result Diagrams: 


                                 01/31/21 03:40





                                 01/31/21 03:40


                                  Troponin/CKMB











CK-MB (CK-2)  1.4 ng/mL (0-6.6)   01/23/21  20:40    


 


Troponin I  0.245 ng/mL (< 0.028)  H  01/24/21  02:37    














- Telemetry


Sinus rhythms and dysrhythmias: sinus rhythm





- Assessment/Plan


Assessment/Plan: 





1. Post Cardiopulomnary arrest


2. Non ischemic CM EF at 15-20%


3. Likely late COVID -19 infection presentation


4. Paroxysmal afib


5. MARIO on CKD





PLAN


- Continue supportive care.


- Renal function worsening. Likely Cardiorenal with such low EF. Will start 

dobutamine gtt to help CO. 


- Will restart IV lasix. 


- Poor long term prognosis.

## 2021-01-31 NOTE — PDOC.HOSPP
- Subjective


Encounter Date: 01/31/21


Encounter Time: 08:00


Subjective: 


is on vent, sedated





- Objective


Vital Signs & Weight: 


                             Vital Signs (12 hours)











  Temp Pulse Resp BP Pulse Ox


 


 01/31/21 14:28   74   120/62 


 


 01/31/21 14:00    17  


 


 01/31/21 13:08   64   94/51 L 


 


 01/31/21 11:55    19  


 


 01/31/21 10:34   65   


 


 01/31/21 10:00    17  


 


 01/31/21 08:00      89 L


 


 01/31/21 07:53   65   103/62 


 


 01/31/21 07:44    19  


 


 01/31/21 06:00    16  


 


 01/31/21 05:00  98.2 F    


 


 01/31/21 04:00    24 H  


 


 01/31/21 03:26   64   


 


 01/31/21 03:00  98.0 F    








                                     Weight











Admit Weight                   195 lb 3.408 oz


 


Weight                         195 lb











                            Most Recent Monitor Data











Heart Rate from ECG            70


 


NIBP                           120/62


 


NIBP BP-Mean                   81


 


Respiration from ECG           16


 


SpO2                           93














I&O: 


                                        











 01/30/21 01/31/21 02/01/21





 06:59 06:59 06:59


 


Intake Total 1923 1454.5 185.60


 


Output Total 2020 1007 370


 


Balance -97 447.5 -184.40











Result Diagrams: 


                                 01/31/21 03:40





                                 01/31/21 03:40





Hospitalist ROS





- Medication


Medications: 


Active Medications











Generic Name Dose Route Start Last Admin





  Trade Name Freq  PRN Reason Stop Dose Admin


 


Albumin Human  50 gm  01/31/21 06:54  01/31/21 08:42





  Albumin 25% 25 Gm/100 Ml Bot  IVPB  02/01/21 06:55  50 gm





  NOW SHIRLEY   Administration


 


Albuterol Sulfate  2 puff  01/28/21 01:00  01/31/21 13:07





  Albuterol 200 Puff (6.7gm Inhaler)  INH   2 puff





  [id number] SHIRLEY   Administration


 


Allopurinol  100 mg  01/28/21 09:00  01/31/21 08:42





  Allopurinol 100 Mg Tab  PO   100 mg





  DAILY SHIRLEY   Administration


 


Apixaban  2.5 mg  01/30/21 21:00  01/31/21 08:41





  Apixaban 2.5 Mg Tab  PO   2.5 mg





  BID SHIRLEY   Administration


 


Aspirin  81 mg  01/29/21 09:00  01/31/21 08:41





  Aspirin Chewable 81 Mg Tab  PO   81 mg





  DAILY SHIRLEY   Administration


 


Atorvastatin Calcium  40 mg  01/24/21 21:00  01/30/21 20:20





  Atorvastatin Calcium 40 Mg Tab  PO   40 mg





  HS SHIRLEY   Administration


 


Carvedilol  3.125 mg  01/29/21 17:00  01/31/21 08:41





  Carvedilol 3.125 Mg Tab  PO   3.125 mg





  BID-WM SHIRLEY   Administration


 


Guaifenesin  600 mg  01/30/21 21:00  01/31/21 09:59





  Guaifenesin Sf Soln 200 Mg/10 Ml Udcup  PER TUBE   600 mg





  BID SHIRLEY   Administration


 


Doxycycline Hyclate 100 mg/  100 mls @ 100 mls/hr  01/28/21 08:00  01/31/21 

08:42





  Sodium Chloride  IVPB   100 mls





  0800,2000 SHIRLEY   Administration


 


Fentanyl  100 mls @ 0 mls/hr  01/28/21 13:30  01/29/21 21:22





  Fentanyl Cadd  IV  02/27/21 13:30  100 mls





  INF SHIRLEY   Administration





  Protocol  





  Per Protocol  


 


Sodium Chloride  1,000 mls @ 10 mls/hr  01/29/21 11:45  01/31/21 10:25





  Normal Saline 0.9%  IV   Not Given





  .Q24H SHIRLEY  


 


Cefepime HCl 1 gm/ Sodium  100 mls @ 200 mls/hr  01/31/21 09:00  01/31/21 08:42





  Chloride  IVPB   100 mls





  DAILY SHIRLEY   Administration


 


Dobutamine HCl/Dextrose 500 mg  250 mls @ 0 mls/hr  01/31/21 13:45  01/31/21 

13:47





  / Device  IVPB   250 mls





  INF SHIRLEY   Administration





  Protocol  





  As Directed  


 


Lorazepam  2 mg  01/28/21 13:30  01/28/21 17:36





  Lorazepam 2 Mg/Ml Vial  SLOW IVP  02/27/21 13:30  2 mg





  Q1H PRN   Administration





  Breakthrough agitation  


 


Metoclopramide HCl  10 mg  01/30/21 07:30  01/31/21 12:37





  Metoclopramide Hcl 10 Mg/2 Ml Vial  IVP   10 mg





  Q6H SHIRLEY   Administration


 


Pantoprazole Sodium  40 mg  01/29/21 09:00  01/31/21 08:42





  Pantoprazole 40 Mg Vial  IVP   40 mg





  DAILY SHIRLEY   Administration


 


Propofol  1,000 mg  01/28/21 13:30  01/31/21 12:39





  Propofol 1,000 Mg/100 Ml Vial  IV  02/27/21 13:30  1,000 mg





  INF PRN   Administration





  TO ACHIEVE GOAL RASS  





  Protocol  


 


Sodium Chloride  10 ml  01/24/21 09:00  01/31/21 08:44





  Flush - Normal Saline 10 Ml Syringe  IVF   10 ml





  Q12HR SHIRLEY   Administration


 


Sodium Chloride  10 ml  01/24/21 02:30  01/24/21 17:44





  Flush - Normal Saline 10 Ml Syringe  IVF   10 ml





  PRN PRN   Administration





  Saline Flush  














Hospitalist Exam


Vitals: 


                             Vital Signs (12 hours)











  Temp Pulse Resp BP Pulse Ox


 


 01/31/21 14:28   74   120/62 


 


 01/31/21 14:00    17  


 


 01/31/21 13:08   64   94/51 L 


 


 01/31/21 11:55    19  


 


 01/31/21 10:34   65   


 


 01/31/21 10:00    17  


 


 01/31/21 08:00      89 L


 


 01/31/21 07:53   65   103/62 


 


 01/31/21 07:44    19  


 


 01/31/21 06:00    16  


 


 01/31/21 05:00  98.2 F    


 


 01/31/21 04:00    24 H  


 


 01/31/21 03:26   64   


 


 01/31/21 03:00  98.0 F    








                                     Weight











Admit Weight                   195 lb 3.408 oz


 


Weight                         195 lb











                            Most Recent Monitor Data











Heart Rate from ECG            70


 


NIBP                           120/62


 


NIBP BP-Mean                   81


 


Respiration from ECG           16


 


SpO2                           93














General Appearance: ill appearing


Eye: PERRL, anicteric sclera


ENT: no oropharyngeal lesions, moist mucosa


Neck: supple, no JVD


Heart: RRR, no murmur


Respiratory: no wheezes, rales, rhonchi


Gastrointestinal: soft, non-tender, non-distended, normal bowel sounds


Extremities: no cyanosis, no edema


Neurological: cranial nerve grossly intact, no focal deficits





Hosp A/P


(1) Acute respiratory failure with hypoxia and hypercarbia


Code(s): J96.01 - ACUTE RESPIRATORY FAILURE WITH HYPOXIA; J96.02 - ACUTE 

RESPIRATORY FAILURE WITH HYPERCAPNIA   Status: Acute   





(2) Acute on chronic systolic (congestive) heart failure


Code(s): I50.23 - ACUTE ON CHRONIC SYSTOLIC (CONGESTIVE) HEART FAILURE   Status:

Acute   





(3) Pneumonia


Code(s): J18.9 - PNEUMONIA, UNSPECIFIED ORGANISM   Status: Acute   


Qualifiers: 


   Pneumonia type: due to unspecified organism 





(4) Nonischemic cardiomyopathy


Code(s): I42.8 - OTHER CARDIOMYOPATHIES   Status: Chronic   





(5) Atrial fibrillation


Code(s): I48.91 - UNSPECIFIED ATRIAL FIBRILLATION   Status: Chronic   


Qualifiers: 


   Atrial fibrillation type: paroxysmal   Qualified Code(s): I48.0 - Paroxysmal 

atrial fibrillation   





(6) GERD (gastroesophageal reflux disease)


Code(s): K21.9 - GASTRO-ESOPHAGEAL REFLUX DISEASE WITHOUT ESOPHAGITIS   Status: 

Chronic   


Qualifiers: 


   Esophagitis presence: without esophagitis   Qualified Code(s): K21.9 - 

Gastro-esophageal reflux disease without esophagitis   





(7) Hyperlipidemia


Code(s): E78.5 - HYPERLIPIDEMIA, UNSPECIFIED   Status: Chronic   


Qualifiers: 


 





(8) Hypertension


Code(s): I10 - ESSENTIAL (PRIMARY) HYPERTENSION   Status: Chronic   


Qualifiers: 


 





(9) Schizophrenia


Code(s): F20.9 - SCHIZOPHRENIA, UNSPECIFIED   Status: Suspected   


Qualifiers: 


   Schizophrenia type: unspecified   Qualified Code(s): F20.9 - Schizophrenia, 

unspecified   





(10) Type 2 diabetes mellitus


Status: Chronic   


Qualifiers: 


   Diabetes mellitus long term insulin use: without long term use 





(11) Hypocalcemia


Code(s): E83.51 - HYPOCALCEMIA   Status: Resolved   





(12) Hypokalemia


Code(s): E87.6 - HYPOKALEMIA   Status: Resolved   





- Plan





diuretics were held due to volume contraction and low sbp


electrolytes have been stable, junctional rhythm resolved. No myoclonic jerks or

seizures after episodes of it on the day he got intubated.


poor prognosis, has multiple med issues including non compliance and suspected 

underlying schizoaffective disorder. 


Unclear if he has sustained anoxic brain injury on top of above with today's 

code blue, although witnessed and prompt resuscitative measures were done.


contnue diuresis as tolerated with close monitoring of electrolytes.


prior ef of 15%, likely is noncomplaint?


has nearly 6 covid pcr testing done all of which have been -ve, is presumed to 

have had covid with pulm infiltrates/fibrosis, on dexamethasone per ID advice.


continue eliquis, asp, lipitor.


has chronic lung changes with current volume overload.


I have given complete updates to niece including poor prognosis on 1/30,  unable

to reach her today on phone.

## 2021-01-31 NOTE — PRG
DATE OF SERVICE:  01/31/2021



35 minutes critical care time.



SUBJECTIVE:  The patient remains intubated on mechanical ventilation.  I cannot get

him to open his eyes to voice. 



OBJECTIVE:  VITAL SIGNS:  Temperature 98.2, pulse 65, blood pressure 100/58.

24-hour intake 1454, output 1007. 

HEENT:  Unremarkable. 

NECK:  No JVD. 

LUNGS:  Coarse rhonchi. 

CARDIAC:  S1 and S2.  Regular. 

ABDOMEN:  Soft. 

EXTREMITIES:  No edema.



LABORATORY DATA:  Sodium 147, potassium 4.6, chloride 109, CO2 of 28, BUN 82,

creatinine 3.3, glucose 93, albumin 3.1.  PH of 7.39, pCO2 of 46, PO2 of 53 on SIMV

rate of 10, tidal volume 500, PEEP 5, pressure support 15, FiO2 of 40%.  His O2 sats

on the monitor running about 90%.  White blood count 13.7, hematocrit 29.3, and

platelet count 291. 



IMAGING STUDIES:  His x-ray continues to show diffuse bilateral infiltrates.



ASSESSMENT:  

1. Status post cardiopulmonary arrest.

2. Acute hypoxic respiratory failure requiring mechanical ventilation.

3. Improved metabolic acidosis.

4. Continued severe hypoxemia.

5. Cardiomyopathy.



PLAN:  He is not weanable at this time.  He is to continue on antibiotic coverage

for possible pneumonia/aspiration pneumonia.  His diuretics are being held today

because of the developing hypernatremia.  He is currently on tube feeds with

__________ at 10 mL/h - the rate is intentionally low because the patient is on

propofol.  I would anticipate the patient being on ventilator for several more days. 







Job ID:  [id number]

## 2021-01-31 NOTE — PDOC.NEPPN
- Subjective


Encounter Date: 01/31/21


Subjective: 


Became hypotensive most of yesterday into today. Still intubated.





- Objective


Vital Signs & Weight: 


                             Vital Signs (12 hours)











  Temp Pulse Resp BP Pulse Ox


 


 01/31/21 14:28   74   120/62 


 


 01/31/21 14:00    17  


 


 01/31/21 13:08   64   94/51 L 


 


 01/31/21 11:55    19  


 


 01/31/21 10:34   65   


 


 01/31/21 10:00    17  


 


 01/31/21 08:00      89 L


 


 01/31/21 07:53   65   103/62 


 


 01/31/21 07:44    19  


 


 01/31/21 06:00    16  


 


 01/31/21 05:00  98.2 F    


 


 01/31/21 04:00    24 H  








                                     Weight











Admit Weight                   195 lb 3.408 oz


 


Weight                         195 lb











                            Most Recent Monitor Data











Heart Rate from ECG            76


 


NIBP                           132/68


 


NIBP BP-Mean                   89


 


Respiration from ECG           16


 


SpO2                           93














I&O: 


                                        











 01/30/21 01/31/21 02/01/21





 06:59 06:59 06:59


 


Intake Total 1923 1454.5 238.90


 


Output Total 2020 1007 420


 


Balance -97 447.5 -181.10











Result Diagrams: 


                                 01/31/21 03:40





                                 01/31/21 03:40





Nephrology ROS





- Medication


Medications: 


Active Medications











Generic Name Dose Route Start Last Admin





  Trade Name Freq  PRN Reason Stop Dose Admin


 


Albumin Human  50 gm  01/31/21 06:54  01/31/21 08:42





  Albumin 25% 25 Gm/100 Ml Bot  IVPB  02/01/21 06:55  50 gm





  NOW SHIRLEY   Administration


 


Albuterol Sulfate  2 puff  01/28/21 01:00  01/31/21 13:07





  Albuterol 200 Puff (6.7gm Inhaler)  INH   2 puff





  [id number] SHIRLEY   Administration


 


Allopurinol  100 mg  01/28/21 09:00  01/31/21 08:42





  Allopurinol 100 Mg Tab  PO   100 mg





  DAILY SHIRLEY   Administration


 


Apixaban  2.5 mg  01/30/21 21:00  01/31/21 08:41





  Apixaban 2.5 Mg Tab  PO   2.5 mg





  BID SHIRLEY   Administration


 


Aspirin  81 mg  01/29/21 09:00  01/31/21 08:41





  Aspirin Chewable 81 Mg Tab  PO   81 mg





  DAILY SHIRLEY   Administration


 


Atorvastatin Calcium  40 mg  01/24/21 21:00  01/30/21 20:20





  Atorvastatin Calcium 40 Mg Tab  PO   40 mg





  HS SHIRLEY   Administration


 


Carvedilol  3.125 mg  01/29/21 17:00  01/31/21 08:41





  Carvedilol 3.125 Mg Tab  PO   3.125 mg





  BID-WM SHIRLEY   Administration


 


Guaifenesin  600 mg  01/30/21 21:00  01/31/21 09:59





  Guaifenesin Sf Soln 200 Mg/10 Ml Udcup  PER TUBE   600 mg





  BID SHIRLEY   Administration


 


Doxycycline Hyclate 100 mg/  100 mls @ 100 mls/hr  01/28/21 08:00  01/31/21 

08:42





  Sodium Chloride  IVPB   100 mls





  0800,2000 SHIRLEY   Administration


 


Fentanyl  100 mls @ 0 mls/hr  01/28/21 13:30  01/29/21 21:22





  Fentanyl Cadd  IV  02/27/21 13:30  100 mls





  INF SHIRLEY   Administration





  Protocol  





  Per Protocol  


 


Sodium Chloride  1,000 mls @ 10 mls/hr  01/29/21 11:45  01/31/21 10:25





  Normal Saline 0.9%  IV   Not Given





  .Q24H SHIRLEY  


 


Cefepime HCl 1 gm/ Sodium  100 mls @ 200 mls/hr  01/31/21 09:00  01/31/21 08:42





  Chloride  IVPB   100 mls





  DAILY SHIRLEY   Administration


 


Dobutamine HCl/Dextrose 500 mg  250 mls @ 0 mls/hr  01/31/21 13:45  01/31/21 

13:47





  / Device  IVPB   250 mls





  INF SHIRLEY   Administration





  Protocol  





  As Directed  


 


Lorazepam  2 mg  01/28/21 13:30  01/28/21 17:36





  Lorazepam 2 Mg/Ml Vial  SLOW IVP  02/27/21 13:30  2 mg





  Q1H PRN   Administration





  Breakthrough agitation  


 


Metoclopramide HCl  10 mg  01/30/21 07:30  01/31/21 12:37





  Metoclopramide Hcl 10 Mg/2 Ml Vial  IVP   10 mg





  Q6H SHIRLEY   Administration


 


Pantoprazole Sodium  40 mg  01/29/21 09:00  01/31/21 08:42





  Pantoprazole 40 Mg Vial  IVP   40 mg





  DAILY SHIRLEY   Administration


 


Propofol  1,000 mg  01/28/21 13:30  01/31/21 12:39





  Propofol 1,000 Mg/100 Ml Vial  IV  02/27/21 13:30  1,000 mg





  INF PRN   Administration





  TO ACHIEVE GOAL RASS  





  Protocol  


 


Sodium Chloride  10 ml  01/24/21 09:00  01/31/21 08:44





  Flush - Normal Saline 10 Ml Syringe  IVF   10 ml





  Q12HR SHIRLEY   Administration


 


Sodium Chloride  10 ml  01/24/21 02:30  01/24/21 17:44





  Flush - Normal Saline 10 Ml Syringe  IVF   10 ml





  PRN PRN   Administration





  Saline Flush  














- Exam


General - other findings: sedated


Eye: anicteric sclera


ENT: normocephalic atraumatic


ENT - other findings: ET tube in place


Neck: symmetric


Respiratory - other findings: ventilator transmitted breath sound noted


Cardiovascular: RRR


Gastrointestinal: soft, non-tender, non-distended, normal bowel sounds


Extremities - other findings: trace ana of the upper limbs


Neurological - other findings: sedated





Nephrology Results





- Labs


Result Diagrams: 


                                 01/31/21 03:40





                                 01/31/21 03:40


Lab results: 


                                        











WBC  13.7 thou/uL (4.8-10.8)  H  01/31/21  03:40    


 


Hgb  9.4 g/dL (14.0-18.0)  L  01/31/21  03:40    


 


Hct  29.3 % (42.0-52.0)  L  01/31/21  03:40    


 


MCV  85.8 fL (78.0-98.0)   01/31/21  03:40    


 


Plt Count  291 thou/uL (130-400)   01/31/21  03:40    


 


Neutrophils %  77.4 % (42.0-75.0)  H  01/31/21  03:40    


 


Band Neuts % (Manual)  1 % (5-11)  L  01/24/21  05:33    


 


ABG pH  7.39  (7.35-7.45)   01/31/21  06:40    


 


ABG pCO2  46.7 mmHg (35.0-45.0)  H  01/31/21  06:40    


 


ABG pO2  53.8 mmHg (> 70.0)  L*  01/31/21  06:40    


 


VBG pH  7.42  (7.32-7.43)   01/28/21  06:30    


 


VBG pCO2  30.9 mmHg (42.0-51.0)  L  01/28/21  06:30    


 


VBG pO2  65.3 mmHg (35.0-45.0)  H  01/28/21  06:30    


 


Sodium  147 mmol/L (136-145)  H  01/31/21  03:40    


 


Potassium  4.6 mmol/L (3.5-5.1)   01/31/21  03:40    


 


Chloride  109 mmol/L ()  H  01/31/21  03:40    


 


Carbon Dioxide  28 mmol/L (23-31)   01/31/21  03:40    


 


BUN  82 mg/dL (8.4-25.7)  H  01/31/21  03:40    


 


Creatinine  3.33 mg/dL (0.7-1.3)  H  01/31/21  03:40    


 


Glucose  93 mg/dL ()   01/31/21  03:40    


 


Lactic Acid  1.7 mmol/L (0.5-2.2)   01/24/21  01:52    


 


Calcium  8.9 mg/dL (7.8-10.44)   01/31/21  03:40    


 


Total Bilirubin  0.7 mg/dL (0.2-1.2)   01/31/21  03:40    


 


AST  49 U/L (5-34)  H  01/31/21  03:40    


 


ALT  96 U/L (8-55)  H  01/31/21  03:40    


 


Alkaline Phosphatase  130 U/L ()  H  01/31/21  03:40    


 


CK-MB (CK-2)  1.4 ng/mL (0-6.6)   01/23/21  20:40    


 


Troponin I  0.245 ng/mL (< 0.028)  H  01/24/21  02:37    


 


C-Reactive Protein  12.35 mg/dL (= or < 0.5)  H  01/28/21  04:48    


 


B-Natriuretic Peptide  3710.8 pg/mL (0-100)  H  01/27/21  16:22    


 


Serum Total Protein  6.1 g/dL (5.8-8.1)   01/31/21  03:40    


 


Albumin  3.1 g/dL (3.4-4.8)  L  01/31/21  03:40    


 


Lipase  4 U/L (8-78)  L  01/28/21  04:48    


 


Urine Ketones  Negative mg/dL (Negative)   01/29/21  10:00    


 


Urine Blood  1+  (Negative)  A  01/29/21  10:00    


 


Urine Nitrite  Negative  (Negative)   01/29/21  10:00    


 


Ur Leukocyte Esterase  25 Shannon/uL (Negative)  A  01/29/21  10:00    


 


Urine RBC  11-20 HPF (0-3)  A  01/29/21  10:00    


 


Urine WBC  7-10 HPF (0-3)  A  01/29/21  10:00    


 


Ur Squamous Epith Cells  None Seen HPF (0-3)   01/29/21  10:00    


 


Urine Bacteria  1+ HPF (None Seen)  A  01/29/21  10:00    








                                        











Sodium  147 mmol/L (136-145)  H  01/31/21  03:40    


 


Potassium  4.6 mmol/L (3.5-5.1)   01/31/21  03:40    


 


Chloride  109 mmol/L ()  H  01/31/21  03:40    


 


Carbon Dioxide  28 mmol/L (23-31)   01/31/21  03:40    


 


Anion Gap  15 mmol/L (10-20)   01/31/21  03:40    


 


BUN  82 mg/dL (8.4-25.7)  H  01/31/21  03:40    


 


Creatinine  3.33 mg/dL (0.7-1.3)  H  01/31/21  03:40    


 


Glucose  93 mg/dL ()   01/31/21  03:40    


 


Calcium  8.9 mg/dL (7.8-10.44)   01/31/21  03:40    


 


Phosphorus  2.5 mg/dL (2.3-4.7)   01/28/21  04:48    


 


Phosphorus  Cancelled   01/28/21  04:48    


 


Albumin  3.1 g/dL (3.4-4.8)  L  01/31/21  03:40    














Nephrology AP PN





- Plan


MARIO: Due to hemodynamic factors related to hypotension, cardiac decompensation 

and diuretics/RAAS blocker. Creat continues to trend up. Cardiorenal syndrome 

remained a concern.


CKD 3.


Presumed Cardiogenic shock.


Hyperkalemia: Resolved.


Metabolic acidosis: Resolved with alkali therapy


Metabolic alkalosis: Due to Volume contraction. Resolved with albumin.


Hypoalbuminemia


Acute respiratory failure requiring intubation


Cardiomyopathy with EF of 15%


Pulmonary infiltrates. ? Pulmonaray edema from CHF vs infective process. Has 

remained afebrile and Covid remained negative. Normal procalcitonin makes 

pulmonary congestion from CHF exacerbation more likely








PLAN


Give 50 grams of albumin.


Discuussed ionotropic therapy with Cardiology. To be started on dobutamine and 

lasix.


Monitor vitals.


Follow electrolytes and renal function

## 2021-01-31 NOTE — RAD
EXAM:

Chest one view:



HISTORY:

Follow-up pneumonia



COMPARISON:

1/30/2021



FINDINGS:

Stable life-support tubes.

Heart size: Stable hepatomegaly.

Lungs: Fairly extensive bilateral interstitial, alveolar, and groundglass opacity changes throughout 
both lungs.

No pneumothorax.



IMPRESSION:

Extensive bilateral pneumonia with little change from prior exam.

Continued short-term follow-up.





Reported By: Mir Ceballos 

Electronically Signed:  1/31/2021 9:52 AM

## 2021-01-31 NOTE — PRG
DATE OF SERVICE:  01/30/2021



SUBJECTIVE:  The patient remains intubated on mechanical ventilation.  He is on some

sedation.  We turned it off and he does not follow commands, although he does move

around. 



OBJECTIVE:  VITAL SIGNS:  His temperature 98.7 with T-max __________, pulse rate 58,

blood pressure 109/60.  He has been weaned off the Levophed.  His O2 saturations

generally running in the mid 90s.  His 24-hour intake was 1923 and output 2020. 

HEENT:  Unremarkable. 

NECK:  No JVD. 

LUNGS:  Coarse rhonchi bilaterally. 

CARDIOVASCULAR:  S1 and S2, regular. 

ABDOMEN:  Soft and nontender. 

EXTREMITIES:  No edema. 

NEUROLOGIC:  He will not follow commands for us.



LABORATORY DATA:  Sodium 143, potassium 4.2, chloride 108, CO2 of 25, BUN 73,

creatinine 2.7, glucose 120, albumin 2.8.  White blood cell count 11.4, hematocrit

26.7, and platelet count 278.  PH of 7.46, pCO2 of 38, pO2 of 65. SIMV rate 10,

tidal volume 500, PEEP 5, pressure 10, FiO2 40%.  His x-ray shows diffuse bilateral

infiltrates that have not changed.  ET tube is in good position. 



ASSESSMENT:  

1. Acute hypoxic respiratory failure requiring mechanical ventilation.

2. Status post cardiopulmonary arrest.

3. Metabolic acidosis, which is corrected and the bicarbonate drip has been stopped.

4. Cardiomyopathy.

5. Thick secretions.



PLAN:  

1. We will add guaifenesin for his secretions.

2. We will try to minimize his sedation.  Weaning will be predicated on improvement

in his neurologic status.  I will review his MAR and discontinue unnecessary

medications. 







Job ID:  [id number]

## 2021-02-01 LAB
ANALYZER IN CARDIO: (no result)
ANION GAP SERPL CALC-SCNC: 16 MMOL/L (ref 10–20)
BASE EXCESS STD BLDA CALC-SCNC: 1.3 MEQ/L
BASOPHILS # BLD AUTO: 0 THOU/UL (ref 0–0.2)
BASOPHILS NFR BLD AUTO: 0.1 % (ref 0–1)
BUN SERPL-MCNC: 80 MG/DL (ref 8.4–25.7)
CA-I BLDA-SCNC: 1.15 MMOL/L (ref 1.12–1.3)
CALCIUM SERPL-MCNC: 8.8 MG/DL (ref 7.8–10.44)
CHLORIDE SERPL-SCNC: 112 MMOL/L (ref 98–107)
CO2 SERPL-SCNC: 27 MMOL/L (ref 23–31)
CREAT CL PREDICTED SERPL C-G-VRATE: 23 ML/MIN (ref 70–130)
EOSINOPHIL # BLD AUTO: 0.8 THOU/UL (ref 0–0.7)
EOSINOPHIL NFR BLD AUTO: 6.1 % (ref 0–10)
GLUCOSE SERPL-MCNC: 79 MG/DL (ref 83–110)
HCO3 BLDA-SCNC: 27.2 MEQ/L (ref 22–28)
HCT VFR BLDA CALC: 27 % (ref 42–52)
HGB BLD-MCNC: 8.7 G/DL (ref 14–18)
HGB BLDA-MCNC: 9.2 G/DL (ref 14–18)
LYMPHOCYTES # BLD: 1.3 THOU/UL (ref 1.2–3.4)
LYMPHOCYTES NFR BLD AUTO: 9.9 % (ref 21–51)
MCH RBC QN AUTO: 27.7 PG (ref 27–31)
MCV RBC AUTO: 85.8 FL (ref 78–98)
MONOCYTES # BLD AUTO: 1.2 THOU/UL (ref 0.11–0.59)
MONOCYTES NFR BLD AUTO: 9 % (ref 0–10)
NEUTROPHILS # BLD AUTO: 9.8 THOU/UL (ref 1.4–6.5)
NEUTROPHILS NFR BLD AUTO: 74.9 % (ref 42–75)
O2 A-A PPRESDIFF RESPIRATORY: 280.12 MMHG (ref 0–20)
PCO2 BLDA: 50.1 MMHG (ref 35–45)
PH BLDA: 7.35 [PH] (ref 7.35–7.45)
PLATELET # BLD AUTO: 248 THOU/UL (ref 130–400)
PO2 BLDA: 49.4 MMHG (ref 70–?)
POTASSIUM BLD-SCNC: 4.2 MMOL/L (ref 3.7–5.3)
POTASSIUM SERPL-SCNC: 4.5 MMOL/L (ref 3.5–5.1)
RBC # BLD AUTO: 3.14 MILL/UL (ref 4.7–6.1)
SODIUM SERPL-SCNC: 150 MMOL/L (ref 136–145)
SPECIMEN DRAWN FROM PATIENT: (no result)
WBC # BLD AUTO: 13.1 THOU/UL (ref 4.8–10.8)

## 2021-02-01 RX ADMIN — Medication SCH ML: at 21:17

## 2021-02-01 RX ADMIN — DOBUTAMINE HYDROCHLORIDE SCH MLS: 200 INJECTION INTRAVENOUS at 08:08

## 2021-02-01 RX ADMIN — GUAIFENESIN SCH MG: 200 SOLUTION ORAL at 08:09

## 2021-02-01 RX ADMIN — GUAIFENESIN SCH MG: 200 SOLUTION ORAL at 21:17

## 2021-02-01 RX ADMIN — ALBUTEROL SULFATE SCH PUFF: 108 INHALANT RESPIRATORY (INHALATION) at 06:36

## 2021-02-01 RX ADMIN — Medication SCH ML: at 08:10

## 2021-02-01 RX ADMIN — ALBUTEROL SULFATE SCH PUFF: 108 INHALANT RESPIRATORY (INHALATION) at 18:56

## 2021-02-01 RX ADMIN — ALBUTEROL SULFATE SCH PUFF: 108 INHALANT RESPIRATORY (INHALATION) at 14:08

## 2021-02-01 RX ADMIN — ALBUMIN HUMAN SCH GM: 250 SOLUTION INTRAVENOUS at 08:08

## 2021-02-01 RX ADMIN — ALBUTEROL SULFATE SCH PUFF: 108 INHALANT RESPIRATORY (INHALATION) at 00:16

## 2021-02-01 RX ADMIN — ASPIRIN 81 MG CHEWABLE TABLET SCH MG: 81 TABLET CHEWABLE at 08:09

## 2021-02-01 NOTE — PDOC.NEPPN
- Subjective


Encounter Date: 02/01/21


Subjective: 


Seen and evaluated. Still intubated.





- Objective


Vital Signs & Weight: 


                             Vital Signs (12 hours)











  Temp Pulse Resp BP Pulse Ox


 


 02/01/21 10:18   72   115/60 


 


 02/01/21 09:54    17  


 


 02/01/21 08:00      93 L


 


 02/01/21 07:47    14  


 


 02/01/21 07:00  98.8 F    


 


 02/01/21 06:37   70   106/55 L 


 


 02/01/21 06:00    28 H  


 


 02/01/21 04:00  98.8 F   28 H  


 


 02/01/21 02:35   72   125/62 


 


 02/01/21 02:00    18  


 


 02/01/21 00:17   75   124/58 L 


 


 02/01/21 00:00  99.2 F   17  








                                     Weight











Admit Weight                   195 lb 3.408 oz


 


Weight                         196 lb 0.32 oz











                            Most Recent Monitor Data











Heart Rate from ECG            72


 


NIBP                           115/60


 


NIBP BP-Mean                   78


 


Respiration from ECG           24


 


SpO2                           92














I&O: 


                                        











 01/31/21 02/01/21 02/02/21





 06:59 06:59 06:59


 


Intake Total 1454.5 823.80 116.0


 


Output Total 1007 1630 625


 


Balance 447.5 -806.20 -509.0











Result Diagrams: 


                                 02/01/21 02:43





                                 02/01/21 02:43





Nephrology ROS





- Medication


Medications: 


Active Medications











Generic Name Dose Route Start Last Admin





  Trade Name Freq  PRN Reason Stop Dose Admin


 


Albuterol Sulfate  2 puff  01/28/21 01:00  02/01/21 06:36





  Albuterol 200 Puff (6.7gm Inhaler)  INH   2 puff





  [id number] SHIRLEY   Administration


 


Allopurinol  100 mg  01/28/21 09:00  02/01/21 08:09





  Allopurinol 100 Mg Tab  PO   100 mg





  DAILY SHIRLEY   Administration


 


Apixaban  2.5 mg  01/30/21 21:00  02/01/21 08:09





  Apixaban 2.5 Mg Tab  PO   2.5 mg





  BID SHIRLEY   Administration


 


Aspirin  81 mg  01/29/21 09:00  02/01/21 08:09





  Aspirin Chewable 81 Mg Tab  PO   81 mg





  DAILY SHIRLEY   Administration


 


Atorvastatin Calcium  40 mg  01/24/21 21:00  01/31/21 20:26





  Atorvastatin Calcium 40 Mg Tab  PO   40 mg





  HS SHIRLEY   Administration


 


Carvedilol  3.125 mg  01/29/21 17:00  02/01/21 08:09





  Carvedilol 3.125 Mg Tab  PO   3.125 mg





  BID-WM SHIRLEY   Administration


 


Guaifenesin  600 mg  01/30/21 21:00  02/01/21 08:09





  Guaifenesin Sf Soln 200 Mg/10 Ml Udcup  PER TUBE   600 mg





  BID SHIRLEY   Administration


 


Doxycycline Hyclate 100 mg/  100 mls @ 100 mls/hr  01/28/21 08:00  02/01/21 08:0

9





  Sodium Chloride  IVPB   100 mls





  0800,2000 SHIRLEY   Administration


 


Fentanyl  100 mls @ 0 mls/hr  01/28/21 13:30  01/31/21 23:45





  Fentanyl Cadd  IV  02/27/21 13:30  100 mls





  INF SHIRLEY   Administration





  Protocol  





  Per Protocol  


 


Cefepime HCl 1 gm/ Sodium  100 mls @ 200 mls/hr  01/31/21 09:00  02/01/21 08:08





  Chloride  IVPB   100 mls





  DAILY SHIRLEY   Administration


 


Dobutamine HCl/Dextrose 500 mg  250 mls @ 0 mls/hr  01/31/21 13:45  02/01/21 

08:08





  / Device  IVPB   250 mls





  INF SHIRLEY   Administration





  Protocol  





  As Directed  


 


Lorazepam  2 mg  01/28/21 13:30  01/28/21 17:36





  Lorazepam 2 Mg/Ml Vial  SLOW IVP  02/27/21 13:30  2 mg





  Q1H PRN   Administration





  Breakthrough agitation  


 


Metoclopramide HCl  10 mg  01/30/21 07:30  02/01/21 08:09





  Metoclopramide Hcl 10 Mg/2 Ml Vial  IVP   10 mg





  Q6H SHIRLEY   Administration


 


Pantoprazole Sodium  40 mg  01/29/21 09:00  02/01/21 08:09





  Pantoprazole 40 Mg Vial  IVP   40 mg





  DAILY SHIRLEY   Administration


 


Propofol  1,000 mg  01/28/21 13:30  02/01/21 05:50





  Propofol 1,000 Mg/100 Ml Vial  IV  02/27/21 13:30  1,000 mg





  INF PRN   Administration





  TO ACHIEVE GOAL RASS  





  Protocol  


 


Sodium Chloride  10 ml  01/24/21 09:00  02/01/21 08:10





  Flush - Normal Saline 10 Ml Syringe  IVF   10 ml





  Q12HR SHIRLEY   Administration


 


Sodium Chloride  10 ml  01/24/21 02:30  01/24/21 17:44





  Flush - Normal Saline 10 Ml Syringe  IVF   10 ml





  PRN PRN   Administration





  Saline Flush  














- Exam


General - other findings: sedated


Eye: anicteric sclera


ENT: normocephalic atraumatic


ENT - other findings: ET tube in place


Neck: symmetric


Respiratory - other findings: coarse ventilator transmitted sound noted


Cardiovascular: RRR


Gastrointestinal: soft, non-tender, non-distended


Extremities - other findings: mild edema of upper limbs


Neurological - other findings: sedated.





Nephrology Results





- Labs


Result Diagrams: 


                                 02/01/21 02:43





                                 02/01/21 02:43


Lab results: 


                                        











WBC  13.1 thou/uL (4.8-10.8)  H  02/01/21  02:43    


 


Hgb  8.7 g/dL (14.0-18.0)  L  02/01/21  02:43    


 


Hct  26.9 % (42.0-52.0)  L  02/01/21  02:43    


 


MCV  85.8 fL (78.0-98.0)   02/01/21  02:43    


 


Plt Count  248 thou/uL (130-400)   02/01/21  02:43    


 


Neutrophils %  74.9 % (42.0-75.0)   02/01/21  02:43    


 


Band Neuts % (Manual)  1 % (5-11)  L  01/24/21  05:33    


 


ABG pH  7.35  (7.35-7.45)   02/01/21  06:30    


 


ABG pCO2  50.1 mmHg (35.0-45.0)  H  02/01/21  06:30    


 


ABG pO2  49.4 mmHg (> 70.0)  L*  02/01/21  06:30    


 


VBG pH  7.42  (7.32-7.43)   01/28/21  06:30    


 


VBG pCO2  30.9 mmHg (42.0-51.0)  L  01/28/21  06:30    


 


VBG pO2  65.3 mmHg (35.0-45.0)  H  01/28/21  06:30    


 


Sodium  150 mmol/L (136-145)  H  02/01/21  02:43    


 


Potassium  4.5 mmol/L (3.5-5.1)   02/01/21  02:43    


 


Chloride  112 mmol/L ()  H  02/01/21  02:43    


 


Carbon Dioxide  27 mmol/L (23-31)   02/01/21  02:43    


 


BUN  80 mg/dL (8.4-25.7)  H  02/01/21  02:43    


 


Creatinine  3.42 mg/dL (0.7-1.3)  H  02/01/21  02:43    


 


Glucose  79 mg/dL ()  L  02/01/21  02:43    


 


Lactic Acid  1.7 mmol/L (0.5-2.2)   01/24/21  01:52    


 


Calcium  8.8 mg/dL (7.8-10.44)   02/01/21  02:43    


 


Total Bilirubin  0.7 mg/dL (0.2-1.2)   01/31/21  03:40    


 


AST  49 U/L (5-34)  H  01/31/21  03:40    


 


ALT  96 U/L (8-55)  H  01/31/21  03:40    


 


Alkaline Phosphatase  130 U/L ()  H  01/31/21  03:40    


 


CK-MB (CK-2)  1.4 ng/mL (0-6.6)   01/23/21  20:40    


 


Troponin I  0.245 ng/mL (< 0.028)  H  01/24/21  02:37    


 


C-Reactive Protein  12.35 mg/dL (= or < 0.5)  H  01/28/21  04:48    


 


B-Natriuretic Peptide  3710.8 pg/mL (0-100)  H  01/27/21  16:22    


 


Serum Total Protein  6.1 g/dL (5.8-8.1)   01/31/21  03:40    


 


Albumin  3.1 g/dL (3.4-4.8)  L  01/31/21  03:40    


 


Lipase  4 U/L (8-78)  L  01/28/21  04:48    


 


Urine Ketones  Negative mg/dL (Negative)   01/29/21  10:00    


 


Urine Blood  1+  (Negative)  A  01/29/21  10:00    


 


Urine Nitrite  Negative  (Negative)   01/29/21  10:00    


 


Ur Leukocyte Esterase  25 Shannon/uL (Negative)  A  01/29/21  10:00    


 


Urine RBC  11-20 HPF (0-3)  A  01/29/21  10:00    


 


Urine WBC  7-10 HPF (0-3)  A  01/29/21  10:00    


 


Ur Squamous Epith Cells  None Seen HPF (0-3)   01/29/21  10:00    


 


Urine Bacteria  1+ HPF (None Seen)  A  01/29/21  10:00    








                                        











Sodium  150 mmol/L (136-145)  H  02/01/21  02:43    


 


Potassium  4.5 mmol/L (3.5-5.1)   02/01/21  02:43    


 


Chloride  112 mmol/L ()  H  02/01/21  02:43    


 


Carbon Dioxide  27 mmol/L (23-31)   02/01/21  02:43    


 


Anion Gap  16 mmol/L (10-20)   02/01/21  02:43    


 


BUN  80 mg/dL (8.4-25.7)  H  02/01/21  02:43    


 


Creatinine  3.42 mg/dL (0.7-1.3)  H  02/01/21  02:43    


 


Glucose  79 mg/dL ()  L  02/01/21  02:43    


 


Calcium  8.8 mg/dL (7.8-10.44)   02/01/21  02:43    


 


Phosphorus  2.5 mg/dL (2.3-4.7)   01/28/21  04:48    


 


Phosphorus  Cancelled   01/28/21  04:48    


 


Albumin  3.1 g/dL (3.4-4.8)  L  01/31/21  03:40    














Nephrology AP PN





- Plan


MARIO: Due to hemodynamic factors related to hypotension, cardiac decompensation 

and diuretics/RAAS blocker. Creat continues to trend up. Cardiorenal syndrome 

remained a concern.


CKD 3.


Presumed Cardiogenic shock. Hypotension improved with dobutamine


Hyperkalemia: Resolved.


Metabolic acidosis: Resolved with alkali therapy


Metabolic alkalosis: Due to Volume contraction. Resolved with albumin.


Hypoalbuminemia


Hypernatremia


Acute respiratory failure requiring intubation


Cardiomyopathy with EF of 15%


Pulmonary infiltrates. ? Pulmonaray edema from CHF vs infective process. Has 

remained afebrile and Covid remained negative. Normal procalcitonin makes 

pulmonary congestion from CHF exacerbation more likely








PLAN


Start 5dw for hypernatremia. tube feeding was held due to high residuals.


Increase lasix to 40 q8h


Add metolazone.


Continue dobutamine


Monitor vitals.


Follow I/O, electrolytes and renal function

## 2021-02-01 NOTE — PRG
DATE OF SERVICE:  02/01/2021



SUBJECTIVE:  Mr. Wick remains mechanically ventilated.



OBJECTIVE:  VITAL SIGNS:  Respiratory rates in the 20s, FiO2 is at 60, blood

pressure 119/65, and heart rate is in the 70s. 

LUNGS:  Clear anteriorly. 

HEART:  Regular rhythm. 

ABDOMEN:  Soft. 

EXTREMITIES:  Without asymmetry.



LABORATORY DATA:  White count 13.1, hemoglobin 8.7, and platelets 248.  Sodium 
150,

potassium 4.5, chloride 112, bicarb 27, BUN 80, and creatinine 3.42. 



IMPRESSION:  

1. Status post cardiorespiratory arrest with severe decrease in left ventricular

ejection fraction, complicated by mitral regurgitation and aortic stenosis. 

2. Acute-on-chronic kidney disease with progressive decline in renal function.

3. 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

4. Hyperosmolar state that is likely iatrogenic. 

The only caregiver with any type of input would be a niece who said that she 
would

authorize the doctors to do whatever they need to do to take care of him and

whatever the doctors felt would be okay with her.  I do feel strongly that Mr. Wick

should be do not resuscitate patient.  He continues to not progress from a

neurological standpoint, dialysis should not be offered and consideration 
towards

withdrawal of care should be entertained.  Neurology input would be helpful in 
my

opinion.  If we are into position when we are making these decisions for the 
niece. 



Critical care time 30 min.



Job ID:  [id number]



MTDD

## 2021-02-01 NOTE — RAD
Portable frontal chest radiograph:

2/1/2021



COMPARISON: 1/31/2021



HISTORY: Pneumonia



FINDINGS: Stable endotracheal tube and nasogastric tube. Stable extensive interstitial and alveolar o
pacity throughout both lungs. Stable heart and mediastinal contours. There supine imaging limits

assessment for pneumothorax and pleural fluid.



IMPRESSION: No significant interval change.



Reported By: Ubaldo Linares 

Electronically Signed:  2/1/2021 8:23 AM

## 2021-02-01 NOTE — PDOC.HOSPP
- Subjective


Encounter Date: 02/01/21


Encounter Time: 11:20


Subjective: 


pt intubated





- Objective


Vital Signs & Weight: 


                             Vital Signs (12 hours)











  Temp Pulse Pulse Pulse Resp BP BP


 


 02/01/21 16:00      22 H  


 


 02/01/21 14:08   71     123/65 


 


 02/01/21 14:00      14  


 


 02/01/21 12:00      14  


 


 02/01/21 10:56    72  72    120/62


 


 02/01/21 10:18   72     115/60 


 


 02/01/21 09:54      17  


 


 02/01/21 08:00       


 


 02/01/21 07:47      14  


 


 02/01/21 07:00  98.8 F      


 


 02/01/21 06:37   70     106/55 L 


 


 02/01/21 06:00      28 H  














  BP Pulse Ox Pulse Ox Pulse Ox


 


 02/01/21 16:00    


 


 02/01/21 14:08    


 


 02/01/21 14:00    


 


 02/01/21 12:00    


 


 02/01/21 10:56  111/62   92 L  91 L


 


 02/01/21 10:18    


 


 02/01/21 09:54    


 


 02/01/21 08:00   93 L  


 


 02/01/21 07:47    


 


 02/01/21 07:00    


 


 02/01/21 06:37    


 


 02/01/21 06:00    








                                     Weight











Admit Weight                   195 lb 3.408 oz


 


Weight                         196 lb 0.32 oz











                            Most Recent Monitor Data











Heart Rate from ECG            70


 


NIBP                           120/68


 


NIBP BP-Mean                   85


 


Respiration from ECG           16


 


SpO2                           94














I&O: 


                                        











 01/31/21 02/01/21 02/02/21





 06:59 06:59 06:59


 


Intake Total 1454.5 823.80 663.8


 


Output Total 1007 1630 1475


 


Balance 447.5 -806.20 -811.2











Result Diagrams: 


                                 02/05/21 04:00





                                 02/05/21 04:00





Hospitalist ROS





- Review of Systems


Other: 





intubated





- Medication


Medications: 


Active Medications











Generic Name Dose Route Start Last Admin





  Trade Name Freq  PRN Reason Stop Dose Admin


 


Albuterol Sulfate  2 puff  01/28/21 01:00  02/01/21 14:08





  Albuterol 200 Puff (6.7gm Inhaler)  INH   2 puff





  [id number] SHIRLEY   Administration


 


Allopurinol  100 mg  01/28/21 09:00  02/01/21 08:09





  Allopurinol 100 Mg Tab  PO   100 mg





  DAILY SHIRLEY   Administration


 


Apixaban  2.5 mg  01/30/21 21:00  02/01/21 08:09





  Apixaban 2.5 Mg Tab  PO   2.5 mg





  BID SHIRLEY   Administration


 


Aspirin  81 mg  01/29/21 09:00  02/01/21 08:09





  Aspirin Chewable 81 Mg Tab  PO   81 mg





  DAILY SHIRLEY   Administration


 


Atorvastatin Calcium  40 mg  01/24/21 21:00  01/31/21 20:26





  Atorvastatin Calcium 40 Mg Tab  PO   40 mg





  HS SHIRLEY   Administration


 


Carvedilol  3.125 mg  01/29/21 17:00  02/01/21 08:09





  Carvedilol 3.125 Mg Tab  PO   3.125 mg





  BID-WM SHIRLEY   Administration


 


Furosemide  40 mg  02/01/21 14:00  02/01/21 14:03





  Furosemide 40 Mg/4 Ml Vial  SLOW IVP   40 mg





  Q8HR SHIRLEY   Administration


 


Guaifenesin  600 mg  01/30/21 21:00  02/01/21 08:09





  Guaifenesin Sf Soln 200 Mg/10 Ml Udcup  PER TUBE   600 mg





  BID SHIRLEY   Administration


 


Doxycycline Hyclate 100 mg/  100 mls @ 100 mls/hr  01/28/21 08:00  02/01/21 

08:09





  Sodium Chloride  IVPB   100 mls





  0800,2000 SHIRLEY   Administration


 


Fentanyl  100 mls @ 0 mls/hr  01/28/21 13:30  01/31/21 23:45





  Fentanyl Cadd  IV  02/27/21 13:30  100 mls





  INF SHIRLEY   Administration





  Protocol  





  Per Protocol  


 


Cefepime HCl 1 gm/ Sodium  100 mls @ 200 mls/hr  01/31/21 09:00  02/01/21 08:08





  Chloride  IVPB   100 mls





  DAILY SHIRLEY   Administration


 


Dobutamine HCl/Dextrose 500 mg  250 mls @ 0 mls/hr  01/31/21 13:45  02/01/21 

08:08





  / Device  IVPB   250 mls





  INF SHIRLEY   Administration





  Protocol  





  As Directed  


 


Dextrose/Water  1,000 mls @ 100 mls/hr  02/01/21 10:45  02/01/21 11:48





  D5w  IV   1,000 mls





  .Q10H SHIRLEY   Administration


 


Lorazepam  2 mg  01/28/21 13:30  01/28/21 17:36





  Lorazepam 2 Mg/Ml Vial  SLOW IVP  02/27/21 13:30  2 mg





  Q1H PRN   Administration





  Breakthrough agitation  


 


Metoclopramide HCl  10 mg  01/30/21 07:30  02/01/21 14:03





  Metoclopramide Hcl 10 Mg/2 Ml Vial  IVP   10 mg





  Q6H SHIRLEY   Administration


 


Pantoprazole Sodium  40 mg  01/29/21 09:00  02/01/21 08:09





  Pantoprazole 40 Mg Vial  IVP   40 mg





  DAILY SHILREY   Administration


 


Propofol  1,000 mg  01/28/21 13:30  02/01/21 05:50





  Propofol 1,000 Mg/100 Ml Vial  IV  02/27/21 13:30  1,000 mg





  INF PRN   Administration





  TO ACHIEVE GOAL RASS  





  Protocol  


 


Sodium Chloride  10 ml  01/24/21 09:00  02/01/21 08:10





  Flush - Normal Saline 10 Ml Syringe  IVF   10 ml





  Q12HR SHIRLEY   Administration


 


Sodium Chloride  10 ml  01/24/21 02:30  01/24/21 17:44





  Flush - Normal Saline 10 Ml Syringe  IVF   10 ml





  PRN PRN   Administration





  Saline Flush  














Hospitalist Exam


Vitals: 


                             Vital Signs (12 hours)











  Temp Pulse Pulse Pulse Resp BP BP


 


 02/01/21 16:00      22 H  


 


 02/01/21 14:08   71     123/65 


 


 02/01/21 14:00      14  


 


 02/01/21 12:00      14  


 


 02/01/21 10:56    72  72    120/62


 


 02/01/21 10:18   72     115/60 


 


 02/01/21 09:54      17  


 


 02/01/21 08:00       


 


 02/01/21 07:47      14  


 


 02/01/21 07:00  98.8 F      


 


 02/01/21 06:37   70     106/55 L 


 


 02/01/21 06:00      28 H  














  BP Pulse Ox Pulse Ox Pulse Ox


 


 02/01/21 16:00    


 


 02/01/21 14:08    


 


 02/01/21 14:00    


 


 02/01/21 12:00    


 


 02/01/21 10:56  111/62   92 L  91 L


 


 02/01/21 10:18    


 


 02/01/21 09:54    


 


 02/01/21 08:00   93 L  


 


 02/01/21 07:47    


 


 02/01/21 07:00    


 


 02/01/21 06:37    


 


 02/01/21 06:00    








                                     Weight











Admit Weight                   195 lb 3.408 oz


 


Weight                         196 lb 0.32 oz











                            Most Recent Monitor Data











Heart Rate from ECG            70


 


NIBP                           120/68


 


NIBP BP-Mean                   85


 


Respiration from ECG           16


 


SpO2                           94














Neck: supple


Heart: RRR, no murmur, no gallops


Respiratory: no wheezes, no rales, no ronchi


Gastrointestinal: soft, non-tender, normal bowel sounds





Hosp A/P





- Plan





MARIO: Due to hemodynamic factors related to hypotension, cardiac decompensation 

and diuretics/RAAS blocker. Creat continues to trend up. Cardiorenal syndrome 

remained a concern.


CKD 3.


Presumed Cardiogenic shock. Hypotension improved with dobutamine


Hyperkalemia: Resolved.


Metabolic acidosis: Resolved with alkali therapy


Metabolic alkalosis: Due to Volume contraction. Resolved with albumin.


Hypoalbuminemia


Hypernatremia


Acute respiratory failure requiring intubation


Cardiomyopathy with EF of 15%


Pulmonary infiltrates. ? Pulmonaray edema from CHF vs infective process. Has 

remained afebrile and Covid remained negative. Normal procalcitonin makes 

pulmonary congestion from CHF exacerbation more likely








PLAN


Start 5dw for hypernatremia. tube feeding was held due to high residuals.


Increase lasix to 40 q8h


Add metolazone.


Continue dobutamine


Monitor vitals.


Follow I/O, electrolytes and renal function


 


2/1 pt diuresing well on dobutamine drip.

## 2021-02-02 LAB
ANALYZER IN CARDIO: (no result)
ANION GAP SERPL CALC-SCNC: 16 MMOL/L (ref 10–20)
BASE EXCESS STD BLDA CALC-SCNC: -0.3 MEQ/L
BASOPHILS # BLD AUTO: 0 THOU/UL (ref 0–0.2)
BASOPHILS NFR BLD AUTO: 0.1 % (ref 0–1)
BUN SERPL-MCNC: 75 MG/DL (ref 8.4–25.7)
CA-I BLDA-SCNC: 1.12 MMOL/L (ref 1.12–1.3)
CALCIUM SERPL-MCNC: 8.3 MG/DL (ref 7.8–10.44)
CHLORIDE SERPL-SCNC: 106 MMOL/L (ref 98–107)
CO2 SERPL-SCNC: 25 MMOL/L (ref 23–31)
CREAT CL PREDICTED SERPL C-G-VRATE: 23 ML/MIN (ref 70–130)
EOSINOPHIL # BLD AUTO: 0.7 THOU/UL (ref 0–0.7)
EOSINOPHIL NFR BLD AUTO: 4 % (ref 0–10)
GLUCOSE SERPL-MCNC: 101 MG/DL (ref 83–110)
HCO3 BLDA-SCNC: 25.4 MEQ/L (ref 22–28)
HCT VFR BLDA CALC: 28 % (ref 42–52)
HGB BLD-MCNC: 8.5 G/DL (ref 14–18)
HGB BLDA-MCNC: 9.4 G/DL (ref 14–18)
LYMPHOCYTES # BLD: 1.1 THOU/UL (ref 1.2–3.4)
LYMPHOCYTES NFR BLD AUTO: 6.8 % (ref 21–51)
MAGNESIUM SERPL-MCNC: 1.9 MG/DL (ref 1.6–2.6)
MCH RBC QN AUTO: 27.9 PG (ref 27–31)
MCV RBC AUTO: 85.9 FL (ref 78–98)
MONOCYTES # BLD AUTO: 1.3 THOU/UL (ref 0.11–0.59)
MONOCYTES NFR BLD AUTO: 7.7 % (ref 0–10)
NEUTROPHILS # BLD AUTO: 13.3 THOU/UL (ref 1.4–6.5)
NEUTROPHILS NFR BLD AUTO: 81.4 % (ref 42–75)
O2 A-A PPRESDIFF RESPIRATORY: 246.68 MMHG (ref 0–20)
PCO2 BLDA: 46.9 MMHG (ref 35–45)
PH BLDA: 7.35 [PH] (ref 7.35–7.45)
PLATELET # BLD AUTO: 226 THOU/UL (ref 130–400)
PO2 BLDA: 51.2 MMHG (ref 70–?)
POTASSIUM BLD-SCNC: 3.91 MMOL/L (ref 3.7–5.3)
POTASSIUM SERPL-SCNC: 4 MMOL/L (ref 3.5–5.1)
RBC # BLD AUTO: 3.06 MILL/UL (ref 4.7–6.1)
SODIUM SERPL-SCNC: 143 MMOL/L (ref 136–145)
SPECIMEN DRAWN FROM PATIENT: (no result)
WBC # BLD AUTO: 16.4 THOU/UL (ref 4.8–10.8)

## 2021-02-02 RX ADMIN — GUAIFENESIN SCH MG: 200 SOLUTION ORAL at 08:47

## 2021-02-02 RX ADMIN — DOBUTAMINE HYDROCHLORIDE SCH MLS: 200 INJECTION INTRAVENOUS at 02:31

## 2021-02-02 RX ADMIN — Medication SCH ML: at 20:29

## 2021-02-02 RX ADMIN — ASPIRIN 81 MG CHEWABLE TABLET SCH MG: 81 TABLET CHEWABLE at 08:45

## 2021-02-02 RX ADMIN — Medication SCH MLS: at 17:10

## 2021-02-02 RX ADMIN — Medication SCH ML: at 08:45

## 2021-02-02 RX ADMIN — GUAIFENESIN SCH MG: 200 SOLUTION ORAL at 20:54

## 2021-02-02 RX ADMIN — ALBUTEROL SULFATE SCH PUFF: 108 INHALANT RESPIRATORY (INHALATION) at 14:15

## 2021-02-02 RX ADMIN — ALBUTEROL SULFATE SCH PUFF: 108 INHALANT RESPIRATORY (INHALATION) at 00:22

## 2021-02-02 RX ADMIN — ALBUTEROL SULFATE SCH PUFF: 108 INHALANT RESPIRATORY (INHALATION) at 06:30

## 2021-02-02 RX ADMIN — ALBUTEROL SULFATE SCH PUFF: 108 INHALANT RESPIRATORY (INHALATION) at 19:34

## 2021-02-02 NOTE — RAD
XR Chest 1 View Portable



HISTORY: Respiratory failure, pneumonia



COMPARISON: Previous day



FINDINGS: No significant interval change is seen since the previous day's exam.



IMPRESSION: Stable exam.



Reported By: Abdullahi Barrientos 

Electronically Signed:  2/2/2021 8:00 AM

## 2021-02-02 NOTE — PDOC.NEPPN
- Subjective


Encounter Date: 02/02/21


Subjective: 


Still intubated and mechanically ventilated.





- Objective


Vital Signs & Weight: 


                             Vital Signs (12 hours)











  Temp Pulse Resp BP


 


 02/02/21 07:53    15 


 


 02/02/21 06:30   76   116/60


 


 02/02/21 06:00    15 


 


 02/02/21 04:00  98.9 F   25 H 


 


 02/02/21 02:56   74  


 


 02/02/21 02:00    23 H 


 


 02/02/21 01:00  98.9 F   


 


 02/02/21 00:00    25 H 


 


 02/01/21 23:05   74   124/62


 


 02/01/21 22:00    28 H 








                                     Weight











Admit Weight                   195 lb 3.408 oz


 


Weight                         196 lb 0.32 oz











                            Most Recent Monitor Data











Heart Rate from ECG            74


 


NIBP                           103/61


 


NIBP BP-Mean                   75


 


Respiration from ECG           23


 


SpO2                           88














I&O: 


                                        











 02/01/21 02/02/21 02/03/21





 06:59 06:59 06:59


 


Intake Total 823.80 2023.8 264.0


 


Output Total 1630 2255 300


 


Balance -806.20 -231.2 -36.0











Result Diagrams: 


                                 02/02/21 03:20





                                 02/02/21 03:20





Nephrology ROS





- Medication


Medications: 


Active Medications











Generic Name Dose Route Start Last Admin





  Trade Name Freq  PRN Reason Stop Dose Admin


 


Albuterol Sulfate  2 puff  01/28/21 01:00  02/02/21 06:30





  Albuterol 200 Puff (6.7gm Inhaler)  INH   2 puff





  [id number] SHIRLEY   Administration


 


Allopurinol  100 mg  01/28/21 09:00  02/02/21 08:45





  Allopurinol 100 Mg Tab  PO   100 mg





  DAILY SHIRLEY   Administration


 


Apixaban  2.5 mg  01/30/21 21:00  02/02/21 08:45





  Apixaban 2.5 Mg Tab  PO   2.5 mg





  BID SHIRLEY   Administration


 


Aspirin  81 mg  01/29/21 09:00  02/02/21 08:45





  Aspirin Chewable 81 Mg Tab  PO   81 mg





  DAILY SHIRLEY   Administration


 


Atorvastatin Calcium  40 mg  01/24/21 21:00  02/01/21 21:04





  Atorvastatin Calcium 40 Mg Tab  PO   40 mg





  HS SHIRLEY   Administration


 


Carvedilol  3.125 mg  01/29/21 17:00  02/02/21 08:45





  Carvedilol 3.125 Mg Tab  PO   3.125 mg





  BID-WM SHIRLEY   Administration


 


Guaifenesin  600 mg  01/30/21 21:00  02/02/21 08:47





  Guaifenesin Sf Soln 200 Mg/10 Ml Udcup  PER TUBE   600 mg





  BID SHIRLEY   Administration


 


Doxycycline Hyclate 100 mg/  100 mls @ 100 mls/hr  01/28/21 08:00  02/02/21 

08:46





  Sodium Chloride  IVPB   100 mls





  0800,2000 SHIRLEY   Administration


 


Fentanyl  100 mls @ 0 mls/hr  01/28/21 13:30  01/31/21 23:45





  Fentanyl Cadd  IV  02/27/21 13:30  100 mls





  INF SHIRLEY   Administration





  Protocol  





  Per Protocol  


 


Cefepime HCl 1 gm/ Sodium  100 mls @ 200 mls/hr  01/31/21 09:00  02/02/21 08:46





  Chloride  IVPB   100 mls





  DAILY SHIRLEY   Administration


 


Dextrose/Water  1,000 mls @ 100 mls/hr  02/01/21 10:45  02/01/21 21:14





  D5w  IV   1,000 mls





  .Q10H SHIRLEY   Administration


 


Lorazepam  2 mg  01/28/21 13:30  01/28/21 17:36





  Lorazepam 2 Mg/Ml Vial  SLOW IVP  02/27/21 13:30  2 mg





  Q1H PRN   Administration





  Breakthrough agitation  


 


Metoclopramide HCl  10 mg  01/30/21 07:30  02/02/21 08:45





  Metoclopramide Hcl 10 Mg/2 Ml Vial  IVP   10 mg





  Q6H SHIRLEY   Administration


 


Pantoprazole Sodium  40 mg  01/29/21 09:00  02/02/21 08:46





  Pantoprazole 40 Mg Vial  IVP   40 mg





  DAILY SHIRLEY   Administration


 


Propofol  1,000 mg  01/28/21 13:30  02/02/21 05:47





  Propofol 1,000 Mg/100 Ml Vial  IV  02/27/21 13:30  1,000 mg





  INF PRN   Administration





  TO ACHIEVE GOAL RASS  





  Protocol  


 


Sodium Chloride  10 ml  01/24/21 09:00  02/02/21 08:45





  Flush - Normal Saline 10 Ml Syringe  IVF   10 ml





  Q12HR SHIRLEY   Administration


 


Sodium Chloride  10 ml  01/24/21 02:30  01/24/21 17:44





  Flush - Normal Saline 10 Ml Syringe  IVF   10 ml





  PRN PRN   Administration





  Saline Flush  














- Exam


General - other findings: Sedated


Eye: anicteric sclera


ENT: normocephalic atraumatic


ENT - other findings: ET tube in place


Neck: symmetric


Respiratory - other findings: ventilator transmitted sound noted


Cardiovascular: RRR


Extremities - other findings: mild bilateral elbow edema


Neurological - other findings: sedated and unresponsive





Nephrology Results





- Labs


Result Diagrams: 


                                 02/02/21 03:20





                                 02/02/21 03:20


Lab results: 


                                        











WBC  16.4 thou/uL (4.8-10.8)  H  02/02/21  03:20    


 


Hgb  8.5 g/dL (14.0-18.0)  L  02/02/21  03:20    


 


Hct  26.3 % (42.0-52.0)  L  02/02/21  03:20    


 


MCV  85.9 fL (78.0-98.0)   02/02/21  03:20    


 


Plt Count  226 thou/uL (130-400)   02/02/21  03:20    


 


Neutrophils %  81.4 % (42.0-75.0)  H  02/02/21  03:20    


 


Band Neuts % (Manual)  1 % (5-11)  L  01/24/21  05:33    


 


ABG pH  7.35  (7.35-7.45)   02/02/21  06:30    


 


ABG pCO2  46.9 mmHg (35.0-45.0)  H  02/02/21  06:30    


 


ABG pO2  51.2 mmHg (> 70.0)  L*  02/02/21  06:30    


 


VBG pH  7.42  (7.32-7.43)   01/28/21  06:30    


 


VBG pCO2  30.9 mmHg (42.0-51.0)  L  01/28/21  06:30    


 


VBG pO2  65.3 mmHg (35.0-45.0)  H  01/28/21  06:30    


 


Sodium  143 mmol/L (136-145)   02/02/21  03:20    


 


Potassium  4.0 mmol/L (3.5-5.1)   02/02/21  03:20    


 


Chloride  106 mmol/L ()   02/02/21  03:20    


 


Carbon Dioxide  25 mmol/L (23-31)   02/02/21  03:20    


 


BUN  75 mg/dL (8.4-25.7)  H  02/02/21  03:20    


 


Creatinine  3.47 mg/dL (0.7-1.3)  H  02/02/21  03:20    


 


Glucose  101 mg/dL ()   02/02/21  03:20    


 


Lactic Acid  1.7 mmol/L (0.5-2.2)   01/24/21  01:52    


 


Calcium  8.3 mg/dL (7.8-10.44)   02/02/21  03:20    


 


Total Bilirubin  0.7 mg/dL (0.2-1.2)   01/31/21  03:40    


 


AST  49 U/L (5-34)  H  01/31/21  03:40    


 


ALT  96 U/L (8-55)  H  01/31/21  03:40    


 


Alkaline Phosphatase  130 U/L ()  H  01/31/21  03:40    


 


CK-MB (CK-2)  1.4 ng/mL (0-6.6)   01/23/21  20:40    


 


Troponin I  0.245 ng/mL (< 0.028)  H  01/24/21  02:37    


 


C-Reactive Protein  12.35 mg/dL (= or < 0.5)  H  01/28/21  04:48    


 


B-Natriuretic Peptide  3710.8 pg/mL (0-100)  H  01/27/21  16:22    


 


Serum Total Protein  6.1 g/dL (5.8-8.1)   01/31/21  03:40    


 


Albumin  3.1 g/dL (3.4-4.8)  L  01/31/21  03:40    


 


Lipase  4 U/L (8-78)  L  01/28/21  04:48    


 


Urine Ketones  Negative mg/dL (Negative)   01/29/21  10:00    


 


Urine Blood  1+  (Negative)  A  01/29/21  10:00    


 


Urine Nitrite  Negative  (Negative)   01/29/21  10:00    


 


Ur Leukocyte Esterase  25 Shannon/uL (Negative)  A  01/29/21  10:00    


 


Urine RBC  11-20 HPF (0-3)  A  01/29/21  10:00    


 


Urine WBC  7-10 HPF (0-3)  A  01/29/21  10:00    


 


Ur Squamous Epith Cells  None Seen HPF (0-3)   01/29/21  10:00    


 


Urine Bacteria  1+ HPF (None Seen)  A  01/29/21  10:00    








                                        











Sodium  143 mmol/L (136-145)   02/02/21  03:20    


 


Potassium  4.0 mmol/L (3.5-5.1)   02/02/21  03:20    


 


Chloride  106 mmol/L ()   02/02/21  03:20    


 


Carbon Dioxide  25 mmol/L (23-31)   02/02/21  03:20    


 


Anion Gap  16 mmol/L (10-20)   02/02/21  03:20    


 


BUN  75 mg/dL (8.4-25.7)  H  02/02/21  03:20    


 


Creatinine  3.47 mg/dL (0.7-1.3)  H  02/02/21  03:20    


 


Glucose  101 mg/dL ()   02/02/21  03:20    


 


Calcium  8.3 mg/dL (7.8-10.44)   02/02/21  03:20    


 


Phosphorus  2.5 mg/dL (2.3-4.7)   01/28/21  04:48    


 


Phosphorus  Cancelled   01/28/21  04:48    


 


Albumin  3.1 g/dL (3.4-4.8)  L  01/31/21  03:40    














Nephrology AP PN





- Plan


MARIO: Due to hemodynamic factors related to hypotension, cardiac decompensation 

and diuretics/RAAS blocker. Creat continues to trend up. Cardiorenal syndrome 

remained a concern. Creat is still trending up with diuretic.


CKD 3.


Presumed Cardiogenic shock. Hypotension improved with dobutamine


Hyperkalemia: Resolved.


Metabolic acidosis: Resolved with alkali therapy


Metabolic alkalosis: Due to Volume contraction. Resolved with albumin.


Hypoalbuminemia


Hypernatremia


Acute respiratory failure requiring intubation


Cardiomyopathy with EF of 15%


Pulmonary infiltrates. ? Pulmonaray edema from CHF vs infective process. Has 

remained afebrile and Covid remained negative. Normal procalcitonin makes 

pulmonary congestion from CHF exacerbation more likely








PLAN


Increase lasix to 40 q6h


Increase metolazone to 5 mg daily


DC D5W as hypernatremia has resolved.


Continue dobutamine


Monitor vitals.


Follow I/O, electrolytes and renal function

## 2021-02-02 NOTE — RAD
Exam: 1 view abdomen



HISTORY: High TF residual



FINDINGS: Nonspecific bowel gas pattern. Nasogastric tube terminates in the distal stomach. No suspic
ious densities in the abdomen or pelvis. Moderate fecal material in the right hemicolon

There are degenerative changes of the lumbar spine with osteophyte formation

Right-sided vascular catheter appears to terminate in the right common iliac arteries

Bibasilar opacities are once again demonstrated

IMPRESSION: Nonspecific bowel gas pattern.



Reported By: Armando Grubbs 

Electronically Signed:  2/2/2021 3:54 PM

## 2021-02-02 NOTE — PRG
DATE OF SERVICE:  02/02/2021



SUBJECTIVE:  Mr. Wick remains sedated for ventilation.



OBJECTIVE:  VITAL SIGNS:  Heart rates in the 70s, blood pressure 116/60, 
respiratory

rates in the teens, FiO2 is at 60.  Intake and output, negative 231. 

LUNGS:  Remarkable for coarse equal breath sounds. 

HEART:  Regular rhythm. 

ABDOMEN:  Soft. 

EXTREMITIES:  Without asymmetry.



LABORATORY STUDIES:  White count 16.4, hemoglobin 8.5, and platelets 226.  
Sodium

143, potassium 4, chloride 106, bicarb 25, BUN 75, and creatinine 3.47. 



IMPRESSION:  

1. Congestive heart failure.

2. Aortic stenosis, mitral regurgitation.

3. Pulmonary edema.

4. Possible coexistent pneumonia.

5. Progressive renal dysfunction.

6. Elevated liver enzymes, most likely from hepatic congestion in my opinion.

7. Respiratory failure, currently not weanable, diffuse infiltrates, and 
impaired

gas exchange. 



His prognosis for functional recovery is extremely poor.  He should be a do not

resuscitate status patient.  Apparently, the niece has turned over these 
decisions

to the physicians.  I would appreciate it if Palliative Care can confirm this. 



Critical care time 30 min.



Job ID:  [id number]



MTDD

## 2021-02-03 LAB
ANION GAP SERPL CALC-SCNC: 20 MMOL/L (ref 10–20)
BASOPHILS # BLD AUTO: 0 THOU/UL (ref 0–0.2)
BASOPHILS NFR BLD AUTO: 0.1 % (ref 0–1)
BUN SERPL-MCNC: 81 MG/DL (ref 8.4–25.7)
CALCIUM SERPL-MCNC: 8.8 MG/DL (ref 7.8–10.44)
CHLORIDE SERPL-SCNC: 102 MMOL/L (ref 98–107)
CO2 SERPL-SCNC: 24 MMOL/L (ref 23–31)
CREAT CL PREDICTED SERPL C-G-VRATE: 18 ML/MIN (ref 70–130)
EOSINOPHIL # BLD AUTO: 0.4 THOU/UL (ref 0–0.7)
EOSINOPHIL NFR BLD AUTO: 2.1 % (ref 0–10)
GLUCOSE SERPL-MCNC: 116 MG/DL (ref 83–110)
HGB BLD-MCNC: 9.3 G/DL (ref 14–18)
LYMPHOCYTES # BLD: 1.1 THOU/UL (ref 1.2–3.4)
LYMPHOCYTES NFR BLD AUTO: 5.7 % (ref 21–51)
MCH RBC QN AUTO: 27.6 PG (ref 27–31)
MCV RBC AUTO: 87.1 FL (ref 78–98)
MONOCYTES # BLD AUTO: 1.1 THOU/UL (ref 0.11–0.59)
MONOCYTES NFR BLD AUTO: 5.9 % (ref 0–10)
NEUTROPHILS # BLD AUTO: 16.8 THOU/UL (ref 1.4–6.5)
NEUTROPHILS NFR BLD AUTO: 86.2 % (ref 42–75)
PLATELET # BLD AUTO: 256 THOU/UL (ref 130–400)
POTASSIUM SERPL-SCNC: 4.5 MMOL/L (ref 3.5–5.1)
RBC # BLD AUTO: 3.37 MILL/UL (ref 4.7–6.1)
SODIUM SERPL-SCNC: 141 MMOL/L (ref 136–145)
WBC # BLD AUTO: 19.5 THOU/UL (ref 4.8–10.8)

## 2021-02-03 RX ADMIN — ALBUTEROL SULFATE SCH PUFF: 108 INHALANT RESPIRATORY (INHALATION) at 08:02

## 2021-02-03 RX ADMIN — ALBUTEROL SULFATE SCH PUFF: 108 INHALANT RESPIRATORY (INHALATION) at 18:31

## 2021-02-03 RX ADMIN — ALBUTEROL SULFATE SCH PUFF: 108 INHALANT RESPIRATORY (INHALATION) at 23:01

## 2021-02-03 RX ADMIN — GUAIFENESIN SCH MG: 200 SOLUTION ORAL at 22:38

## 2021-02-03 RX ADMIN — Medication SCH MLS: at 15:05

## 2021-02-03 RX ADMIN — GUAIFENESIN SCH MG: 200 SOLUTION ORAL at 09:59

## 2021-02-03 RX ADMIN — Medication SCH ML: at 09:56

## 2021-02-03 RX ADMIN — Medication SCH ML: at 21:43

## 2021-02-03 RX ADMIN — ALBUTEROL SULFATE SCH PUFF: 108 INHALANT RESPIRATORY (INHALATION) at 14:34

## 2021-02-03 RX ADMIN — ASPIRIN 81 MG CHEWABLE TABLET SCH MG: 81 TABLET CHEWABLE at 09:50

## 2021-02-03 RX ADMIN — ALBUTEROL SULFATE SCH PUFF: 108 INHALANT RESPIRATORY (INHALATION) at 00:02

## 2021-02-03 NOTE — PRG
DATE OF SERVICE:  02/03/2021



SUBJECTIVE:  Louie Wick remains mechanically ventilated.



OBJECTIVE:  VITAL SIGNS:  Heart rate is 111, oximetry is 100%, blood pressure

108/65. 

LUNGS:  Remarkable for equal breath sounds. 

HEART:  Regular rhythm. 

ABDOMEN:  Soft. 

EXTREMITIES:  Without asymmetry.



DIAGNOSTIC STUDIES:  Chest radiograph shows diffuse infiltrates.



LABORATORY DATA:  White count 19.5, hemoglobin 9.3, and platelets 256.  Sodium 
141,

potassium 4.5, chloride 102, bicarb 24, BUN 81, and creatinine 4.42. 



IMPRESSION:  

1. Respiratory failure associated with a code.

2. Progressive renal failure.

3. Diffuse infiltrates, most likely secondary to refractory congestive heart 
failure.

4. Aortic stenosis.

5. Mitral regurgitation.

6. Left ventricular systolic dysfunction.

7. Probable anoxic brain injury.



PLAN:  After multiple codes, leading to transfer to the critical care unit, we 
will

continue to follow. 



Critical care time 30 min.



Job ID:  [id number]



MTDD

## 2021-02-03 NOTE — CT
PRELIMINARY REPORT/[facility]/EMERGENCY AFTER HOURS PROCEDURE



This report was discussed with Philippe Whtiehead RN by Tati Schulz on Feb 03, 2021 01:59:00 CS
T.



Addendum electronically signed by Tati Schulz on February 3, 2021 1:59:53 AM CST



 





CT brain without contrast: 



Comparison: 01/21/2021 



Findings: 



No intracranial hemorrhage. 

No mass lesion. 

No midline shift or herniation. 

There is attenuation and streak artifact limiting detail at several levels. Gray-white differentiatio
n is diffusely indistinct in both cerebral hemispheres but preserved in the cerebellum. There is

some gray-white differentiation in the basal ganglia. No hydrocephalus. 

Chronic damage in the left temporal lobe. The bones, sinuses and soft tissues are otherwise unremarka
ble. 



Impression: 



Indistinct gray-white differentiation in both cerebral hemispheres. This could be benign artifact. Ea
rly edema related to diffuse infarct from hypoperfusion or an anoxic injury can have this

appearance. No evidence of hemorrhage. Correlate with clinical symptoms. Consider MRI to further eval
uate if indicated.



 

ELECTRONICALLY SIGNED BY:

Carlton Mckay MD

Feb 3, 2021 1:48:19 AM CST



This report is intended for review by the ordering physician only, in accordance of law. If you recei
ve this report in error, please call [facility] at [phone].



 





FINAL REPORT 





Exam: Head CT without contrast



HISTORY: Deterioration of loss of consciousness



COMPARISON: 1/21/2021



FINDINGS:

Hemorrhage: No intraparenchymal hemorrhage or extra-axial hematoma.

Brain parenchyma: Stable encephalomalacia and gliosis involving the left temporal lobe. Grossly gray-
white matter differentiation appears be preserved but is limited due to motion degradation streak

artifact.

Ventricular system: Ventricles and sulci are patent and symmetric.

Calvarium: Intact.

Sinuses and mastoid air cells: Bilateral opacification of the mastoid air cells. Bilateral middle ear
 opacification.



IMPRESSION:



1. This report is in agreement with initial report by [facility]. Limited evaluation due to mackenzie
m attenuation artifact and motion attenuation artifact.

2. Questionable loss of gray-white matter differentiation. Further evaluation with brain MRI is recom
mended.

3. Bilateral otomastoiditis.







Transcribed Date/Time: 2/3/2021 7:38 AM



Reported By: Armando Grubbs 

Electronically Signed:  2/3/2021 7:56 AM

## 2021-02-03 NOTE — PDOC.NEPPN
- Subjective


Encounter Date: 02/03/21


Subjective: 


Still intubated and mechanically ventilated. Developed hypotension and atrial 

fibrillation. Now on levophed. Urine output dropped significantly afterwards 

despite diuretics.





- Objective


Vital Signs & Weight: 


                             Vital Signs (12 hours)











  Temp Pulse Pulse Pulse Resp BP BP


 


 02/03/21 14:38   103 H     


 


 02/03/21 14:00      14  


 


 02/03/21 12:00      14  


 


 02/03/21 10:45   111 H     


 


 02/03/21 10:11    104 H  109 H   119/68  104/66


 


 02/03/21 10:00      17  


 


 02/03/21 09:50   121 H     


 


 02/03/21 08:00  98.7 F  120 H    16  














  Pulse Ox Pulse Ox Pulse Ox


 


 02/03/21 14:38   


 


 02/03/21 14:00   


 


 02/03/21 12:00   


 


 02/03/21 10:45   


 


 02/03/21 10:11   98  97


 


 02/03/21 10:00   


 


 02/03/21 09:50   


 


 02/03/21 08:00  91 L  








                                     Weight











Admit Weight                   195 lb 3.408 oz


 


Weight                         208 lb 1.862 oz











                            Most Recent Monitor Data











Heart Rate from ECG            95


 


NIBP                           105/65


 


NIBP BP-Mean                   78


 


Respiration from ECG           12


 


SpO2                           97














I&O: 


                                        











 02/02/21 02/03/21 02/04/21





 06:59 06:59 06:59


 


Intake Total 2023.8 1584.0 246


 


Output Total 2255 1015 30


 


Balance -231.2 569.0 216











Result Diagrams: 


                                 02/03/21 04:15





                                 02/03/21 04:15





Nephrology ROS





- Medication


Medications: 


Active Medications











Generic Name Dose Route Start Last Admin





  Trade Name Freq  PRN Reason Stop Dose Admin


 


Albuterol Sulfate  2 puff  01/28/21 01:00  02/03/21 14:34





  Albuterol 200 Puff (6.7gm Inhaler)  INH   2 puff





  [id number] SHIRLEY   Administration


 


Allopurinol  100 mg  01/28/21 09:00  02/03/21 09:50





  Allopurinol 100 Mg Tab  PO   100 mg





  DAILY SHIRLEY   Administration


 


Apixaban  2.5 mg  01/30/21 21:00  02/03/21 09:50





  Apixaban 2.5 Mg Tab  PO   2.5 mg





  BID SHIRLEY   Administration


 


Aspirin  81 mg  01/29/21 09:00  02/03/21 09:50





  Aspirin Chewable 81 Mg Tab  PO   81 mg





  DAILY SHIRLEY   Administration


 


Atorvastatin Calcium  40 mg  01/24/21 21:00  02/02/21 20:27





  Atorvastatin Calcium 40 Mg Tab  PO   40 mg





  HS SHIRELY   Administration


 


Carvedilol  3.125 mg  01/29/21 17:00  02/03/21 09:50





  Carvedilol 3.125 Mg Tab  PO   3.125 mg





  BID-WM SHIRLEY   Administration


 


Furosemide  40 mg  02/02/21 12:00  02/03/21 10:52





  Furosemide 40 Mg/4 Ml Vial  SLOW IVP   Not Given





  Q6H SHIRLEY  


 


Guaifenesin  600 mg  01/30/21 21:00  02/03/21 09:59





  Guaifenesin Sf Soln 200 Mg/10 Ml Udcup  PER TUBE   600 mg





  BID SHIRLEY   Administration


 


Doxycycline Hyclate 100 mg/  100 mls @ 100 mls/hr  01/28/21 08:00  02/03/21 

09:50





  Sodium Chloride  IVPB   100 mls





  0800,2000 SHIRLEY   Administration


 


Fentanyl  100 mls @ 0 mls/hr  01/28/21 13:30  01/31/21 23:45





  Fentanyl Cadd  IV  02/27/21 13:30  100 mls





  INF SHIRLEY   Administration





  Protocol  





  Per Protocol  


 


Cefepime HCl 1 gm/ Sodium  100 mls @ 200 mls/hr  01/31/21 09:00  02/03/21 09:51





  Chloride  IVPB   100 mls





  DAILY SHIRLEY   Administration


 


Norepinephrine Bitartrate  250 mls @ 0 mls/hr  01/31/21 10:45  02/03/21 15:05





  Levophed  IVPB   250 mls





  INF SHIRLEY   Administration





  Protocol  





  Titrate  


 


Dextrose/Water  1,000 mls @ 100 mls/hr  02/01/21 10:45  02/03/21 11:59





  D5w  IV   1,000 mls





  .Q10H SHIRLEY   Administration


 


Lorazepam  2 mg  01/28/21 13:30  01/28/21 17:36





  Lorazepam 2 Mg/Ml Vial  SLOW IVP  02/27/21 13:30  2 mg





  Q1H PRN   Administration





  Breakthrough agitation  


 


Metoclopramide HCl  10 mg  01/30/21 07:30  02/03/21 14:19





  Metoclopramide Hcl 10 Mg/2 Ml Vial  IVP   10 mg





  Q6H SHIRLEY   Administration


 


Propofol  1,000 mg  01/28/21 13:30  02/02/21 16:44





  Propofol 1,000 Mg/100 Ml Vial  IV  02/27/21 13:30  1,000 mg





  INF PRN   Administration





  TO ACHIEVE GOAL RASS  





  Protocol  


 


Sodium Chloride  10 ml  01/24/21 09:00  02/03/21 09:56





  Flush - Normal Saline 10 Ml Syringe  IVF   10 ml





  Q12HR SHIRLEY   Administration


 


Sodium Chloride  10 ml  01/24/21 02:30  01/24/21 17:44





  Flush - Normal Saline 10 Ml Syringe  IVF   10 ml





  PRN PRN   Administration





  Saline Flush  














- Exam


General - other findings: unresponsive


ENT: normocephalic atraumatic


Neck: symmetric


Respiratory - other findings: ventilator transmitted sound noted


Cardiovascular: irregular


Gastrointestinal: soft


Extremities - other findings: edema of the extremities noted


Neurological - other findings: unresponsive.





Nephrology Results





- Labs


Result Diagrams: 


                                 02/03/21 04:15





                                 02/03/21 04:15


Lab results: 


                                        











WBC  19.5 thou/uL (4.8-10.8)  H  02/03/21  04:15    


 


Hgb  9.3 g/dL (14.0-18.0)  L  02/03/21  04:15    


 


Hct  29.3 % (42.0-52.0)  L  02/03/21  04:15    


 


MCV  87.1 fL (78.0-98.0)   02/03/21  04:15    


 


Plt Count  256 thou/uL (130-400)   02/03/21  04:15    


 


Neutrophils %  86.2 % (42.0-75.0)  H  02/03/21  04:15    


 


Band Neuts % (Manual)  1 % (5-11)  L  01/24/21  05:33    


 


ABG pH  7.35  (7.35-7.45)   02/02/21  06:30    


 


ABG pCO2  46.9 mmHg (35.0-45.0)  H  02/02/21  06:30    


 


ABG pO2  51.2 mmHg (> 70.0)  L*  02/02/21  06:30    


 


VBG pH  7.42  (7.32-7.43)   01/28/21  06:30    


 


VBG pCO2  30.9 mmHg (42.0-51.0)  L  01/28/21  06:30    


 


VBG pO2  65.3 mmHg (35.0-45.0)  H  01/28/21  06:30    


 


Sodium  141 mmol/L (136-145)   02/03/21  04:15    


 


Potassium  4.5 mmol/L (3.5-5.1)   02/03/21  04:15    


 


Chloride  102 mmol/L ()   02/03/21  04:15    


 


Carbon Dioxide  24 mmol/L (23-31)   02/03/21  04:15    


 


BUN  81 mg/dL (8.4-25.7)  H  02/03/21  04:15    


 


Creatinine  4.42 mg/dL (0.7-1.3)  H  02/03/21  04:15    


 


Glucose  116 mg/dL ()  H  02/03/21  04:15    


 


Lactic Acid  1.7 mmol/L (0.5-2.2)   01/24/21  01:52    


 


Calcium  8.8 mg/dL (7.8-10.44)   02/03/21  04:15    


 


Total Bilirubin  0.7 mg/dL (0.2-1.2)   01/31/21  03:40    


 


AST  49 U/L (5-34)  H  01/31/21  03:40    


 


ALT  96 U/L (8-55)  H  01/31/21  03:40    


 


Alkaline Phosphatase  130 U/L ()  H  01/31/21  03:40    


 


CK-MB (CK-2)  1.4 ng/mL (0-6.6)   01/23/21  20:40    


 


Troponin I  0.245 ng/mL (< 0.028)  H  01/24/21  02:37    


 


C-Reactive Protein  12.35 mg/dL (= or < 0.5)  H  01/28/21  04:48    


 


B-Natriuretic Peptide  3710.8 pg/mL (0-100)  H  01/27/21  16:22    


 


Serum Total Protein  6.1 g/dL (5.8-8.1)   01/31/21  03:40    


 


Albumin  3.1 g/dL (3.4-4.8)  L  01/31/21  03:40    


 


Lipase  4 U/L (8-78)  L  01/28/21  04:48    


 


Urine Ketones  Negative mg/dL (Negative)   01/29/21  10:00    


 


Urine Blood  1+  (Negative)  A  01/29/21  10:00    


 


Urine Nitrite  Negative  (Negative)   01/29/21  10:00    


 


Ur Leukocyte Esterase  25 Shannon/uL (Negative)  A  01/29/21  10:00    


 


Urine RBC  11-20 HPF (0-3)  A  01/29/21  10:00    


 


Urine WBC  7-10 HPF (0-3)  A  01/29/21  10:00    


 


Ur Squamous Epith Cells  None Seen HPF (0-3)   01/29/21  10:00    


 


Urine Bacteria  1+ HPF (None Seen)  A  01/29/21  10:00    








                                        











Sodium  141 mmol/L (136-145)   02/03/21  04:15    


 


Potassium  4.5 mmol/L (3.5-5.1)   02/03/21  04:15    


 


Chloride  102 mmol/L ()   02/03/21  04:15    


 


Carbon Dioxide  24 mmol/L (23-31)   02/03/21  04:15    


 


Anion Gap  20 mmol/L (10-20)   02/03/21  04:15    


 


BUN  81 mg/dL (8.4-25.7)  H  02/03/21  04:15    


 


Creatinine  4.42 mg/dL (0.7-1.3)  H  02/03/21  04:15    


 


Glucose  116 mg/dL ()  H  02/03/21  04:15    


 


Calcium  8.8 mg/dL (7.8-10.44)   02/03/21  04:15    


 


Phosphorus  4.7 mg/dL (2.3-4.7)   02/02/21  03:20    


 


Magnesium  1.9 mg/dL (1.6-2.6)   02/02/21  03:20    


 


Albumin  3.1 g/dL (3.4-4.8)  L  01/31/21  03:40    














Nephrology AP PN





- Plan


MARIO: Due to hemodynamic factors related to hypotension, cardiac decompensation 

and diuretics/RAAS blocker. Creat continues to trend up. Cardiorenal syndrome 

remained a concern. Had another hypotensive episode and now on levophed.  Creat 

is still trending up.


CKD 3.


Presumed Cardiogenic shock. Hypotension improved with dobutamine. However 

developed atrial fib and hypotension hence now on levophed.


Hyperkalemia: Resolved.


Metabolic acidosis: Resolved with alkali therapy


Metabolic alkalosis: Due to Volume contraction. Resolved


Hypoalbuminemia


Hypernatremia


Acute respiratory failure requiring intubation


Cardiomyopathy with EF of 15%


Valvular heart disease: Moderate aortic stenosis/regurgitation as well as 

moderate mitral regurgitation


Pulmonary infiltrates. From CHF most likely. Has normal procalcitonin,remained 

afebrile and Covid remained negative. 








PLAN


Will give a dose of lasix 80 mg IV x 1 and monitor response. Lasix was held 

earlier on due to hypotension. 


Follow I/O, electrolytes and renal function





Critically ill with guarded prognosis at best.

## 2021-02-03 NOTE — RAD
Chest AP view



INDICATION: History of pneumonia



COMPARISON: February 2, 2021



FINDINGS:



Lungs:  Bilateral airspace disease persists. Patient remains intubated with gastric catheter placemen
t.



Cardiac silhouette:  Mild cardiomegaly persists



Pulmonary vasculature:  Mild pulmonary vascular congestion persists



Pleural spaces:  No pleural effusion or pneumothorax is demonstrated.



Upper abdomen:  No abnormality seen.



Osseous structures:  No acute osseous abnormality.



Additional findings:  None.



IMPRESSION: 

Stable exam. No pneumothorax.



Reported By: Wilfredo Wright 

Electronically Signed:  2/3/2021 7:36 AM

## 2021-02-04 LAB
ANION GAP SERPL CALC-SCNC: 19 MMOL/L (ref 10–20)
BASOPHILS # BLD AUTO: 0 THOU/UL (ref 0–0.2)
BASOPHILS NFR BLD AUTO: 0 % (ref 0–1)
BUN SERPL-MCNC: 92 MG/DL (ref 8.4–25.7)
CALCIUM SERPL-MCNC: 8.6 MG/DL (ref 7.8–10.44)
CHLORIDE SERPL-SCNC: 100 MMOL/L (ref 98–107)
CO2 SERPL-SCNC: 24 MMOL/L (ref 23–31)
CREAT CL PREDICTED SERPL C-G-VRATE: 14 ML/MIN (ref 70–130)
EOSINOPHIL # BLD AUTO: 0.4 THOU/UL (ref 0–0.7)
EOSINOPHIL NFR BLD AUTO: 2.3 % (ref 0–10)
GLUCOSE SERPL-MCNC: 97 MG/DL (ref 83–110)
HGB BLD-MCNC: 9 G/DL (ref 14–18)
LYMPHOCYTES # BLD: 1.3 THOU/UL (ref 1.2–3.4)
LYMPHOCYTES NFR BLD AUTO: 8 % (ref 21–51)
MCH RBC QN AUTO: 27.9 PG (ref 27–31)
MCV RBC AUTO: 87.2 FL (ref 78–98)
MONOCYTES # BLD AUTO: 1.1 THOU/UL (ref 0.11–0.59)
MONOCYTES NFR BLD AUTO: 6.8 % (ref 0–10)
NEUTROPHILS # BLD AUTO: 13.8 THOU/UL (ref 1.4–6.5)
NEUTROPHILS NFR BLD AUTO: 83 % (ref 42–75)
PLATELET # BLD AUTO: 218 THOU/UL (ref 130–400)
POTASSIUM SERPL-SCNC: 4.7 MMOL/L (ref 3.5–5.1)
RBC # BLD AUTO: 3.22 MILL/UL (ref 4.7–6.1)
SODIUM SERPL-SCNC: 138 MMOL/L (ref 136–145)
WBC # BLD AUTO: 16.7 THOU/UL (ref 4.8–10.8)

## 2021-02-04 RX ADMIN — Medication SCH ML: at 09:02

## 2021-02-04 RX ADMIN — GUAIFENESIN SCH: 200 SOLUTION ORAL at 11:13

## 2021-02-04 RX ADMIN — ALBUTEROL SULFATE SCH PUFF: 108 INHALANT RESPIRATORY (INHALATION) at 07:13

## 2021-02-04 RX ADMIN — PANTOPRAZOLE SODIUM SCH: 40 GRANULE, DELAYED RELEASE ORAL at 11:13

## 2021-02-04 RX ADMIN — ASPIRIN 81 MG CHEWABLE TABLET SCH: 81 TABLET CHEWABLE at 11:13

## 2021-02-04 RX ADMIN — Medication SCH MLS: at 12:07

## 2021-02-04 RX ADMIN — ALBUTEROL SULFATE SCH PUFF: 108 INHALANT RESPIRATORY (INHALATION) at 14:44

## 2021-02-04 RX ADMIN — ALBUTEROL SULFATE SCH PUFF: 108 INHALANT RESPIRATORY (INHALATION) at 18:15

## 2021-02-04 RX ADMIN — Medication SCH ML: at 21:25

## 2021-02-04 RX ADMIN — GUAIFENESIN SCH MG: 200 SOLUTION ORAL at 21:24

## 2021-02-04 NOTE — PRG
DATE OF SERVICE:  02/04/2021



SUBJECTIVE:  Louie Wick opens his eyes, but does not follow commands.  He does
not

make eye contact. 



OBJECTIVE:  LUNGS:  He has equal breath sounds. 

HEART:  Regular rhythm. 

ABDOMEN:  Soft. 

EXTREMITIES:  Without asymmetry.



LABORATORY DATA:  White count 16.7, hemoglobin 9, and platelets 218.  Sodium 
130,

potassium 4.7, chloride 100, bicarb 24, BUN 92, and creatinine 5.8.  Intake and

outputs, positive 2832.  He has only had 70 mL of urine out. 



IMPRESSION AND PLAN:  

1. Acute renal failure.

2. Systolic cardiomyopathy.

3. Aortic stenosis.

4. Mitral regurgitation.

5. Anoxic brain injury after a code.  He should not be dialyzed in my opinion. 



He is off his pressors this morning.  We will continue with supportive and 
comfort

care.  I do not expect him to survive this illness. 



Critical care time 30 min.



Job ID:  [id number]



MTDD

## 2021-02-04 NOTE — EKG
Test Reason : STAT

Blood Pressure : ***/*** mmHG

Vent. Rate : 113 BPM     Atrial Rate : 086 BPM

   P-R Int : 000 ms          QRS Dur : 114 ms

    QT Int : 346 ms       P-R-T Axes : 000 003 157 degrees

   QTc Int : 474 ms

 

Atrial fibrillation with rapid ventricular response

Incomplete left bundle branch block



Abnormal ECG

When compared with ECG of 28-JAN-2021 13:16,

Atrial fibrillation has replaced Junctional rhythm

Vent. rate has increased BY  54 BPM

Incomplete left bundle branch block is now Present

Confirmed by DR. YARI TATUM ([id number]) on 2/4/2021 1:23:40 PM

 

Referred By:  AMILCAR           Confirmed By:DR. YARI TATUM

## 2021-02-04 NOTE — RAD
Portable frontal chest radiograph:

2/4/2021



COMPARISON: 2/3/2021



HISTORY: Reevaluate pneumonia



FINDINGS: Stable endotracheal tube and nasogastric tube. There is diffuse coarse linear interstitial 
density with superimposed airspace disease throughout both lungs, left greater than right, with a

perihilar/bibasilar predominance. These findings do not appear significantly changed.



IMPRESSION: No significant interval change.



Reported By: Ubaldo Linares 

Electronically Signed:  2/4/2021 8:08 AM

## 2021-02-04 NOTE — PDOC.NEPPN
- Subjective


Encounter Date: 02/04/21


Subjective: 


Still intubated and mechanically ventilated. Made only about 100 cc of urine 

after lasix 80 mg.





- Objective


Vital Signs & Weight: 


                             Vital Signs (12 hours)











  Temp Pulse Resp


 


 02/04/21 09:58    17


 


 02/04/21 08:00  97.2 F L   24 H


 


 02/04/21 07:14   71 


 


 02/04/21 05:54    14


 


 02/04/21 04:00  98.6 F  


 


 02/04/21 03:40    14


 


 02/04/21 02:18   71 


 


 02/04/21 02:00    14


 


 02/04/21 00:00  98.6 F   14


 


 02/03/21 22:59   71 








                                     Weight











Admit Weight                   195 lb 3.408 oz


 


Weight                         208 lb 12.444 oz











                            Most Recent Monitor Data











Heart Rate from ECG            74


 


NIBP                           119/57


 


NIBP BP-Mean                   77


 


Respiration from ECG           15


 


SpO2                           94














I&O: 


                                        











 02/03/21 02/04/21 02/05/21





 06:59 06:59 06:59


 


Intake Total 1584.0 2902.1 


 


Output Total 1015 70 1200


 


Balance 569.0 2832.1 -1200











Result Diagrams: 


                                 02/04/21 03:40





                                 02/04/21 03:40





Nephrology ROS





- Medication


Medications: 


Active Medications











Generic Name Dose Route Start Last Admin





  Trade Name Freq  PRN Reason Stop Dose Admin


 


Albuterol Sulfate  2 puff  01/28/21 01:00  02/04/21 07:13





  Albuterol 200 Puff (6.7gm Inhaler)  INH   2 puff





  [id number] SHIRLEY   Administration


 


Allopurinol  100 mg  01/28/21 09:00  02/03/21 09:50





  Allopurinol 100 Mg Tab  PO   100 mg





  DAILY SHIRLEY   Administration


 


Aspirin  81 mg  01/29/21 09:00  02/03/21 09:50





  Aspirin Chewable 81 Mg Tab  PO   81 mg





  DAILY SHIRLEY   Administration


 


Atorvastatin Calcium  40 mg  01/24/21 21:00  02/03/21 21:42





  Atorvastatin Calcium 40 Mg Tab  PO   40 mg





  HS SHIRLEY   Administration


 


Carvedilol  3.125 mg  01/29/21 17:00  02/03/21 15:50





  Carvedilol 3.125 Mg Tab  PO   3.125 mg





  BID-WM SHIRLEY   Administration


 


Guaifenesin  600 mg  01/30/21 21:00  02/03/21 22:38





  Guaifenesin Sf Soln 200 Mg/10 Ml Udcup  PER TUBE   600 mg





  BID SHIRLEY   Administration


 


Doxycycline Hyclate 100 mg/  100 mls @ 100 mls/hr  01/28/21 08:00  02/04/21 

08:55





  Sodium Chloride  IVPB   100 mls





  0800,2000 SHIRLEY   Administration


 


Fentanyl  100 mls @ 0 mls/hr  01/28/21 13:30  01/31/21 23:45





  Fentanyl Cadd  IV  02/27/21 13:30  100 mls





  INF SHIRLEY   Administration





  Protocol  





  Per Protocol  


 


Cefepime HCl 1 gm/ Sodium  100 mls @ 200 mls/hr  01/31/21 09:00  02/04/21 08:55





  Chloride  IVPB   100 mls





  DAILY SHIRLEY   Administration


 


Norepinephrine Bitartrate  250 mls @ 0 mls/hr  01/31/21 10:45  02/03/21 15:05





  Levophed  IVPB   250 mls





  INF SHIRLEY   Administration





  Protocol  





  Titrate  


 


Dextrose/Water  1,000 mls @ 50 mls/hr  02/03/21 17:50  02/03/21 17:45





  D5w  IV   1,000 mls





  .Q20H SHIRLEY   Administration


 


Lorazepam  2 mg  01/28/21 13:30  01/28/21 17:36





  Lorazepam 2 Mg/Ml Vial  SLOW IVP  02/27/21 13:30  2 mg





  Q1H PRN   Administration





  Breakthrough agitation  


 


Metoclopramide HCl  10 mg  01/30/21 07:30  02/04/21 08:55





  Metoclopramide Hcl 10 Mg/2 Ml Vial  IVP   10 mg





  Q6H SHIRLEY   Administration


 


Propofol  1,000 mg  01/28/21 13:30  02/02/21 16:44





  Propofol 1,000 Mg/100 Ml Vial  IV  02/27/21 13:30  1,000 mg





  INF PRN   Administration





  TO ACHIEVE GOAL RASS  





  Protocol  


 


Sodium Chloride  10 ml  01/24/21 09:00  02/04/21 09:02





  Flush - Normal Saline 10 Ml Syringe  IVF   10 ml





  Q12HR SHIRLEY   Administration


 


Sodium Chloride  10 ml  01/24/21 02:30  01/24/21 17:44





  Flush - Normal Saline 10 Ml Syringe  IVF   10 ml





  PRN PRN   Administration





  Saline Flush  














- Exam


General - other findings: unresponsive


Eye: anicteric sclera


ENT: normocephalic atraumatic


ENT - other findings: ET tube is in place


Neck: symmetric


Respiratory - other findings: ventilator transmitted sound noted


Gastrointestinal: soft, non-distended, diminished bowl sounds


Extremities - other findings: edema of the upper extremities noted


Neurological - other findings: unresponsive





Nephrology Results





- Labs


Result Diagrams: 


                                 02/04/21 03:40





                                 02/04/21 03:40


Lab results: 


                                        











WBC  16.7 thou/uL (4.8-10.8)  H  02/04/21  03:40    


 


Hgb  9.0 g/dL (14.0-18.0)  L  02/04/21  03:40    


 


Hct  28.1 % (42.0-52.0)  L  02/04/21  03:40    


 


MCV  87.2 fL (78.0-98.0)   02/04/21  03:40    


 


Plt Count  218 thou/uL (130-400)   02/04/21  03:40    


 


Neutrophils %  83.0 % (42.0-75.0)  H  02/04/21  03:40    


 


Band Neuts % (Manual)  1 % (5-11)  L  01/24/21  05:33    


 


ABG pH  7.35  (7.35-7.45)   02/02/21  06:30    


 


ABG pCO2  46.9 mmHg (35.0-45.0)  H  02/02/21  06:30    


 


ABG pO2  51.2 mmHg (> 70.0)  L*  02/02/21  06:30    


 


VBG pH  7.42  (7.32-7.43)   01/28/21  06:30    


 


VBG pCO2  30.9 mmHg (42.0-51.0)  L  01/28/21  06:30    


 


VBG pO2  65.3 mmHg (35.0-45.0)  H  01/28/21  06:30    


 


Sodium  138 mmol/L (136-145)   02/04/21  03:40    


 


Potassium  4.7 mmol/L (3.5-5.1)   02/04/21  03:40    


 


Chloride  100 mmol/L ()   02/04/21  03:40    


 


Carbon Dioxide  24 mmol/L (23-31)   02/04/21  03:40    


 


BUN  92 mg/dL (8.4-25.7)  H  02/04/21  03:40    


 


Creatinine  5.80 mg/dL (0.7-1.3)  H  02/04/21  03:40    


 


Glucose  97 mg/dL ()   02/04/21  03:40    


 


Lactic Acid  1.7 mmol/L (0.5-2.2)   01/24/21  01:52    


 


Calcium  8.6 mg/dL (7.8-10.44)   02/04/21  03:40    


 


Total Bilirubin  0.7 mg/dL (0.2-1.2)   01/31/21  03:40    


 


AST  49 U/L (5-34)  H  01/31/21  03:40    


 


ALT  96 U/L (8-55)  H  01/31/21  03:40    


 


Alkaline Phosphatase  130 U/L ()  H  01/31/21  03:40    


 


CK-MB (CK-2)  1.4 ng/mL (0-6.6)   01/23/21  20:40    


 


Troponin I  0.245 ng/mL (< 0.028)  H  01/24/21  02:37    


 


C-Reactive Protein  12.35 mg/dL (= or < 0.5)  H  01/28/21  04:48    


 


B-Natriuretic Peptide  3710.8 pg/mL (0-100)  H  01/27/21  16:22    


 


Serum Total Protein  6.1 g/dL (5.8-8.1)   01/31/21  03:40    


 


Albumin  3.1 g/dL (3.4-4.8)  L  01/31/21  03:40    


 


Lipase  4 U/L (8-78)  L  01/28/21  04:48    


 


Urine Ketones  Negative mg/dL (Negative)   01/29/21  10:00    


 


Urine Blood  1+  (Negative)  A  01/29/21  10:00    


 


Urine Nitrite  Negative  (Negative)   01/29/21  10:00    


 


Ur Leukocyte Esterase  25 Shannon/uL (Negative)  A  01/29/21  10:00    


 


Urine RBC  11-20 HPF (0-3)  A  01/29/21  10:00    


 


Urine WBC  7-10 HPF (0-3)  A  01/29/21  10:00    


 


Ur Squamous Epith Cells  None Seen HPF (0-3)   01/29/21  10:00    


 


Urine Bacteria  1+ HPF (None Seen)  A  01/29/21  10:00    








                                        











Sodium  138 mmol/L (136-145)   02/04/21  03:40    


 


Potassium  4.7 mmol/L (3.5-5.1)   02/04/21  03:40    


 


Chloride  100 mmol/L ()   02/04/21  03:40    


 


Carbon Dioxide  24 mmol/L (23-31)   02/04/21  03:40    


 


Anion Gap  19 mmol/L (10-20)   02/04/21  03:40    


 


BUN  92 mg/dL (8.4-25.7)  H  02/04/21  03:40    


 


Creatinine  5.80 mg/dL (0.7-1.3)  H  02/04/21  03:40    


 


Glucose  97 mg/dL ()   02/04/21  03:40    


 


Calcium  8.6 mg/dL (7.8-10.44)   02/04/21  03:40    


 


Phosphorus  4.7 mg/dL (2.3-4.7)   02/02/21  03:20    


 


Magnesium  1.9 mg/dL (1.6-2.6)   02/02/21  03:20    


 


Albumin  3.1 g/dL (3.4-4.8)  L  01/31/21  03:40    














Nephrology AP PN





- Plan


Acute renal failure: Due to hemodynamic factors related to hypotension, cardiac 

decompensation and diuretics/RAAS blocker with superimposed ATN. Creat continues

to trend up. Creat is still trending up. Now anuric.


CKD 3.


Presumed Cardiogenic shock. S/p Dobutamin and later Levophed. Off Levophed


Hyperkalemia: Resolved.


Metabolic acidosis: Resolved with alkali therapy


Metabolic alkalosis: Due to Volume contraction. Resolved


Hypoalbuminemia


Hypernatremia


Acute respiratory failure requiring intubation


Cardiomyopathy with EF of 15%


Valvular heart disease: Moderate aortic stenosis/regurgitation as well as 

moderate mitral regurgitation


Pulmonary infiltrates. From CHF most likely. Had normal procalcitonin,remained 

afebrile and Covid remained negative. 








PLAN


No nindication for emergent dialysis today as electrolytes are acceptable.


Discussed acute renal filure in relation to advance CHF and other medical 

conditions with patient Surrogate decision maker. She is inclinded to 

continuation of aggressive treatment including HD.


Family meeting with surrogate decision maker is planned today.


Continue supportive care.


Further direction to be obtained after family meeting.


Follow I/O, electrolytes and renal function


Critically ill with guarded prognosis at best.

## 2021-02-04 NOTE — PDOC.PALCO
Palliative Care Consult





- Consult Details


Requesting Physician: Dr Sifuentes/Hospitalist


Reason for Consult: goals of care, complex decision-making





- Pertinent HPI





75 year old male who has a significant medical history including cardiomyopathy,

ER 10-15%, DM, CKD show initially presented to [facility] emergency room for 

increase in altered mental status and unresolving pneumonia.  Negative for 

nausea, vomiting.  Prior admission for heart failure/covid pneumonia. After 

evaluation at the [facility] ER he was identified to have worsening pneumonia 

with sepsis. Transferred to [facility] for higher level of care. Despite 

aggressive measures patient required intubation to sustain airway and optimal 

lung perfusion. 





- Pertinent PMH





DM, Schizophrenia, Atrial Fib, Dementia, cardiomyopathy, CKD





- Social History


Smoking: no tobacco exposure


Alcohol Use: none


Drug Use History: none


Living Situation: other (LIves with neice)





- Medications


MAR Reviewed: Yes





- Allergies


Allergies/Adverse Reactions: 


                                    Allergies











Allergy/AdvReac Type Severity Reaction Status Date / Time


 


No Known Allergies Allergy   Verified 10/19/20 12:31














- Subjective





Intubated, mechanical ventilation. Opens eyes, however no purposeful engagement





- ROS


Non Response: due to endotracheal tube, due to mental status





- Objective


Vital Signs: 


                            Vital Signs - Most Recent











Temp Pulse Resp BP Pulse Ox


 


 96.4 F L  76   17   119/68   95 


 


 02/04/21 11:00  02/04/21 10:57  02/04/21 12:00  02/03/21 10:11  02/04/21 08:00











Palliative Performance Scale: 10





- Physical Exam


Constitutional: encephalitic, ill appearing


HEENT: moist MMs, sclera anicteric


Respiratory: no wheezing


Deviation from normal: Mechanical ventilation


Cardiovascular: no significant murmur, RRR


Gastrointestinal: soft, non-tender


Genitourinary: anne catheter


Musculoskeletal: no cyanosis, no clubbing, diffuse muscle atrophy


Deviation from normal: Facial symmetry


Skin: cap refill <2 seconds, no lesions


Deviation from normal: encephalopathic





- Problem List


(1) Palliative care encounter


Code(s): Z51.5 - ENCOUNTER FOR PALLIATIVE CARE   Current Visit: Yes   Status: 

Acute   





(2) Acute respiratory failure with hypoxia and hypercarbia


Code(s): J96.01 - ACUTE RESPIRATORY FAILURE WITH HYPOXIA; J96.02 - ACUTE 

RESPIRATORY FAILURE WITH HYPERCAPNIA   Current Visit: Yes   Status: Acute   





(3) Altered mental status


Code(s): R41.82 - ALTERED MENTAL STATUS, UNSPECIFIED   Current Visit: Yes   

Status: Acute   





(4) Pneumonia


Code(s): J18.9 - PNEUMONIA, UNSPECIFIED ORGANISM   Current Visit: Yes   Status: 

Acute   


Qualifiers: 


   Pneumonia type: due to unspecified organism 





(5) Sepsis


Code(s): A41.9 - SEPSIS, UNSPECIFIED ORGANISM   Current Visit: Yes   Status: 

Acute   





(6) Acute on chronic systolic (congestive) heart failure


Code(s): I50.23 - ACUTE ON CHRONIC SYSTOLIC (CONGESTIVE) HEART FAILURE   Current

Visit: No   Status: Acute   





(7) Type 2 diabetes mellitus


Current Visit: No   Status: Chronic   


Qualifiers: 


   Diabetes mellitus long term insulin use: without long term use 





- Plan/Recommendations


Plan:


Met with patient moe at bedside. Discussed poor prognosis paired with "what w

ould Mr Wick want".  





She relayed her father/patient brother is coming late Friday to help "get 

affairs in order".





Niece and family do not desire for further aggressive measures, including 

dialysis. They understand that Mr Wick may pass and are accepting of this.





She and her father plan to come visit Saturday and discuss transition of care to

comfort measures, consideration of compassionate extubation.  Initially was 

overwhelmed with topic of Hospice, but accepting to revisit 2/5 and have 

available for transition to comfort measures.





Please also refer to Palliative care notes in note section.





Palliative care will follow up 2/5/2021





GOALS:


No further aggressive interventions


Niece and patient brother to come Saturday with consideration of transition to 

comfort measures after patient brother arrives from [place]




















[50] minutes spent on this encounter with >50% of the time in counseling and 

coordination of care.





Thank you for this very appropriate consult.

## 2021-02-05 VITALS — DIASTOLIC BLOOD PRESSURE: 62 MMHG | SYSTOLIC BLOOD PRESSURE: 129 MMHG

## 2021-02-05 LAB
ANION GAP SERPL CALC-SCNC: 21 MMOL/L (ref 10–20)
BASOPHILS # BLD AUTO: 0 THOU/UL (ref 0–0.2)
BASOPHILS NFR BLD AUTO: 0 % (ref 0–1)
BUN SERPL-MCNC: 103 MG/DL (ref 8.4–25.7)
CALCIUM SERPL-MCNC: 8.3 MG/DL (ref 7.8–10.44)
CHLORIDE SERPL-SCNC: 98 MMOL/L (ref 98–107)
CO2 SERPL-SCNC: 22 MMOL/L (ref 23–31)
CREAT CL PREDICTED SERPL C-G-VRATE: 12 ML/MIN (ref 70–130)
EOSINOPHIL # BLD AUTO: 0.3 THOU/UL (ref 0–0.7)
EOSINOPHIL NFR BLD AUTO: 2 % (ref 0–10)
GLUCOSE SERPL-MCNC: 90 MG/DL (ref 83–110)
HGB BLD-MCNC: 9 G/DL (ref 14–18)
LYMPHOCYTES # BLD: 1.2 THOU/UL (ref 1.2–3.4)
LYMPHOCYTES NFR BLD AUTO: 7.3 % (ref 21–51)
MCH RBC QN AUTO: 27.7 PG (ref 27–31)
MCV RBC AUTO: 85.1 FL (ref 78–98)
MONOCYTES # BLD AUTO: 1.7 THOU/UL (ref 0.11–0.59)
MONOCYTES NFR BLD AUTO: 10.1 % (ref 0–10)
NEUTROPHILS # BLD AUTO: 13.3 THOU/UL (ref 1.4–6.5)
NEUTROPHILS NFR BLD AUTO: 80.6 % (ref 42–75)
PLATELET # BLD AUTO: 251 THOU/UL (ref 130–400)
POTASSIUM SERPL-SCNC: 4.8 MMOL/L (ref 3.5–5.1)
RBC # BLD AUTO: 3.25 MILL/UL (ref 4.7–6.1)
SODIUM SERPL-SCNC: 136 MMOL/L (ref 136–145)
WBC # BLD AUTO: 16.4 THOU/UL (ref 4.8–10.8)

## 2021-02-05 RX ADMIN — ASPIRIN 81 MG CHEWABLE TABLET SCH: 81 TABLET CHEWABLE at 10:38

## 2021-02-05 RX ADMIN — GUAIFENESIN SCH MG: 200 SOLUTION ORAL at 20:43

## 2021-02-05 RX ADMIN — ALBUTEROL SULFATE SCH PUFF: 108 INHALANT RESPIRATORY (INHALATION) at 18:18

## 2021-02-05 RX ADMIN — PANTOPRAZOLE SODIUM SCH: 40 GRANULE, DELAYED RELEASE ORAL at 10:38

## 2021-02-05 RX ADMIN — GUAIFENESIN SCH: 200 SOLUTION ORAL at 10:38

## 2021-02-05 RX ADMIN — ALBUTEROL SULFATE SCH PUFF: 108 INHALANT RESPIRATORY (INHALATION) at 14:16

## 2021-02-05 RX ADMIN — Medication PRN ML: at 09:04

## 2021-02-05 RX ADMIN — Medication SCH ML: at 09:04

## 2021-02-05 RX ADMIN — ALBUTEROL SULFATE SCH PUFF: 108 INHALANT RESPIRATORY (INHALATION) at 01:20

## 2021-02-05 RX ADMIN — ALBUTEROL SULFATE SCH PUFF: 108 INHALANT RESPIRATORY (INHALATION) at 07:05

## 2021-02-05 RX ADMIN — Medication SCH ML: at 20:43

## 2021-02-05 NOTE — PDOC.NEPPN
- Subjective


Encounter Date: 02/05/21


Subjective: 


Seen and examined. Still on unresponsives though still on fentanyl.





- Objective


Vital Signs & Weight: 


                             Vital Signs (12 hours)











  Temp Pulse Resp Pulse Ox


 


 02/05/21 10:35   83  


 


 02/05/21 10:00    14 


 


 02/05/21 09:00  98.2 F   


 


 02/05/21 08:00    18  96


 


 02/05/21 07:05   83  


 


 02/05/21 06:00    16 


 


 02/05/21 04:00  98.7 F   16 


 


 02/05/21 02:12   81  


 


 02/05/21 02:00    16 


 


 02/05/21 00:00  98.6 F   


 


 02/04/21 23:42    14 








                                     Weight











Admit Weight                   195 lb 3.408 oz


 


Weight                         208 lb 15.971 oz











                            Most Recent Monitor Data











Heart Rate from ECG            83


 


NIBP                           117/63


 


NIBP BP-Mean                   81


 


Respiration from ECG           14


 


SpO2                           92














I&O: 


                                        











 02/04/21 02/05/21 02/06/21





 06:59 06:59 06:59


 


Intake Total 2902.1 1756 102


 


Output Total 70 1937 100


 


Balance 2832.1 -181 2











Result Diagrams: 


                                 02/05/21 04:00





                                 02/05/21 04:00





Nephrology ROS





- Medication


Medications: 


Active Medications











Generic Name Dose Route Start Last Admin





  Trade Name Freq  PRN Reason Stop Dose Admin


 


Albuterol Sulfate  2 puff  01/28/21 01:00  02/05/21 07:05





  Albuterol 200 Puff (6.7gm Inhaler)  INH   2 puff





  [id number] SHIRLEY   Administration


 


Allopurinol  100 mg  01/28/21 09:00  02/05/21 10:38





  Allopurinol 100 Mg Tab  PO   Not Given





  DAILY SHIRLEY  


 


Aspirin  81 mg  01/29/21 09:00  02/05/21 10:38





  Aspirin Chewable 81 Mg Tab  PO   Not Given





  DAILY SHIRLEY  


 


Atorvastatin Calcium  40 mg  01/24/21 21:00  02/04/21 21:24





  Atorvastatin Calcium 40 Mg Tab  PO   40 mg





  HS SHIRLEY   Administration


 


Carvedilol  3.125 mg  01/29/21 17:00  02/05/21 10:38





  Carvedilol 3.125 Mg Tab  PO   Not Given





  BID- SHIRLEY  


 


Guaifenesin  600 mg  01/30/21 21:00  02/05/21 10:38





  Guaifenesin Sf Soln 200 Mg/10 Ml Udcup  PER TUBE   Not Given





  BID SHIRLEY  


 


Doxycycline Hyclate 100 mg/  100 mls @ 100 mls/hr  01/28/21 08:00  02/05/21 

09:03





  Sodium Chloride  IVPB   100 mls





  0800,2000 SHIRLEY   Administration


 


Fentanyl  100 mls @ 0 mls/hr  01/28/21 13:30  02/04/21 12:07





  Fentanyl Cadd  IV  02/27/21 13:30  100 mls





  INF SHIRLEY   Administration





  Protocol  





  Per Protocol  


 


Cefepime HCl 1 gm/ Sodium  100 mls @ 200 mls/hr  01/31/21 09:00  02/05/21 09:02





  Chloride  IVPB   100 mls





  DAILY SHIRLEY   Administration


 


Norepinephrine Bitartrate  250 mls @ 0 mls/hr  01/31/21 10:45  02/03/21 15:05





  Levophed  IVPB   250 mls





  INF SHIRLEY   Administration





  Protocol  





  Titrate  


 


Dextrose/Water  1,000 mls @ 50 mls/hr  02/03/21 17:50  02/05/21 10:38





  D5w  IV   1,000 mls





  .Q20H SHIRLEY   Administration


 


Lorazepam  2 mg  01/28/21 13:30  01/28/21 17:36





  Lorazepam 2 Mg/Ml Vial  SLOW IVP  02/27/21 13:30  2 mg





  Q1H PRN   Administration





  Breakthrough agitation  


 


Metoclopramide HCl  10 mg  01/30/21 07:30  02/05/21 09:02





  Metoclopramide Hcl 10 Mg/2 Ml Vial  IVP   10 mg





  Q6H SHIRLEY   Administration


 


Pantoprazole Sodium  40 mg  02/04/21 09:00  02/05/21 10:38





  Pantoprazole 40 Mg Granules Packet  PER TUBE   Not Given





  DAILY SHIRLEY  


 


Propofol  1,000 mg  01/28/21 13:30  02/02/21 16:44





  Propofol 1,000 Mg/100 Ml Vial  IV  02/27/21 13:30  1,000 mg





  INF PRN   Administration





  TO ACHIEVE GOAL RASS  





  Protocol  


 


Sodium Chloride  10 ml  01/24/21 09:00  02/05/21 09:04





  Flush - Normal Saline 10 Ml Syringe  IVF   10 ml





  Q12HR SHIRLEY   Administration


 


Sodium Chloride  10 ml  01/24/21 02:30  02/05/21 09:04





  Flush - Normal Saline 10 Ml Syringe  IVF   10 ml





  PRN PRN   Administration





  Saline Flush  














- Exam


General - other findings: unresponsive


Eye: anicteric sclera


ENT: normocephalic atraumatic


ENT - other findings: ET tube in place


Neck: symmetric


Respiratory - other findings: ventilator transmitted sound


Cardiovascular: RRR


Gastrointestinal: soft, non-tender, non-distended


Extremities - other findings: edema of the extremities


Neurological - other findings: unresponsive. Still on fentanyl. opens eye and 

flinches with stimulation





Nephrology Results





- Labs


Result Diagrams: 


                                 02/05/21 04:00





                                 02/05/21 04:00


Lab results: 


                                        











WBC  16.4 thou/uL (4.8-10.8)  H  02/05/21  04:00    


 


Hgb  9.0 g/dL (14.0-18.0)  L  02/05/21  04:00    


 


Hct  27.6 % (42.0-52.0)  L  02/05/21  04:00    


 


MCV  85.1 fL (78.0-98.0)   02/05/21  04:00    


 


Plt Count  251 thou/uL (130-400)   02/05/21  04:00    


 


Neutrophils %  80.6 % (42.0-75.0)  H  02/05/21  04:00    


 


Band Neuts % (Manual)  1 % (5-11)  L  01/24/21  05:33    


 


ABG pH  7.35  (7.35-7.45)   02/02/21  06:30    


 


ABG pCO2  46.9 mmHg (35.0-45.0)  H  02/02/21  06:30    


 


ABG pO2  51.2 mmHg (> 70.0)  L*  02/02/21  06:30    


 


VBG pH  7.42  (7.32-7.43)   01/28/21  06:30    


 


VBG pCO2  30.9 mmHg (42.0-51.0)  L  01/28/21  06:30    


 


VBG pO2  65.3 mmHg (35.0-45.0)  H  01/28/21  06:30    


 


Sodium  136 mmol/L (136-145)   02/05/21  04:00    


 


Potassium  4.8 mmol/L (3.5-5.1)   02/05/21  04:00    


 


Chloride  98 mmol/L ()   02/05/21  04:00    


 


Carbon Dioxide  22 mmol/L (23-31)  L  02/05/21  04:00    


 


BUN  103 mg/dL (8.4-25.7)  H  02/05/21  04:00    


 


Creatinine  6.87 mg/dL (0.7-1.3)  H  02/05/21  04:00    


 


Glucose  90 mg/dL ()   02/05/21  04:00    


 


Lactic Acid  1.7 mmol/L (0.5-2.2)   01/24/21  01:52    


 


Calcium  8.3 mg/dL (7.8-10.44)   02/05/21  04:00    


 


Total Bilirubin  0.7 mg/dL (0.2-1.2)   01/31/21  03:40    


 


AST  49 U/L (5-34)  H  01/31/21  03:40    


 


ALT  96 U/L (8-55)  H  01/31/21  03:40    


 


Alkaline Phosphatase  130 U/L ()  H  01/31/21  03:40    


 


CK-MB (CK-2)  1.4 ng/mL (0-6.6)   01/23/21  20:40    


 


Troponin I  0.245 ng/mL (< 0.028)  H  01/24/21  02:37    


 


C-Reactive Protein  12.35 mg/dL (= or < 0.5)  H  01/28/21  04:48    


 


B-Natriuretic Peptide  3710.8 pg/mL (0-100)  H  01/27/21  16:22    


 


Serum Total Protein  6.1 g/dL (5.8-8.1)   01/31/21  03:40    


 


Albumin  3.1 g/dL (3.4-4.8)  L  01/31/21  03:40    


 


Lipase  4 U/L (8-78)  L  01/28/21  04:48    


 


Urine Ketones  Negative mg/dL (Negative)   01/29/21  10:00    


 


Urine Blood  1+  (Negative)  A  01/29/21  10:00    


 


Urine Nitrite  Negative  (Negative)   01/29/21  10:00    


 


Ur Leukocyte Esterase  25 Shannon/uL (Negative)  A  01/29/21  10:00    


 


Urine RBC  11-20 HPF (0-3)  A  01/29/21  10:00    


 


Urine WBC  7-10 HPF (0-3)  A  01/29/21  10:00    


 


Ur Squamous Epith Cells  None Seen HPF (0-3)   01/29/21  10:00    


 


Urine Bacteria  1+ HPF (None Seen)  A  01/29/21  10:00    








                                        











Sodium  136 mmol/L (136-145)   02/05/21  04:00    


 


Potassium  4.8 mmol/L (3.5-5.1)   02/05/21  04:00    


 


Chloride  98 mmol/L ()   02/05/21  04:00    


 


Carbon Dioxide  22 mmol/L (23-31)  L  02/05/21  04:00    


 


Anion Gap  21 mmol/L (10-20)  H  02/05/21  04:00    


 


BUN  103 mg/dL (8.4-25.7)  H  02/05/21  04:00    


 


Creatinine  6.87 mg/dL (0.7-1.3)  H  02/05/21  04:00    


 


Glucose  90 mg/dL ()   02/05/21  04:00    


 


Calcium  8.3 mg/dL (7.8-10.44)   02/05/21  04:00    


 


Phosphorus  4.7 mg/dL (2.3-4.7)   02/02/21  03:20    


 


Magnesium  1.9 mg/dL (1.6-2.6)   02/02/21  03:20    


 


Albumin  3.1 g/dL (3.4-4.8)  L  01/31/21  03:40    














Nephrology AP PN





- Plan


Acute renal failure: Due to hemodynamic factors related to hypotension, cardiac 

decompensation and diuretics/RAAS blocker with superimposed ATN. Creat continues

to trend up. Creat is still trending up. Now Oliguric


CKD 3.


Presumed Cardiogenic shock. S/p Dobutamin and later Levophed. Off Levophed


Hyperkalemia: Resolved.


Metabolic acidosis: Resolved with alkali therapy


Metabolic alkalosis: Due to Volume contraction. Resolved


Hypoalbuminemia


Hypernatremia


Acute respiratory failure requiring intubation


Cardiomyopathy with EF of 15%


Valvular heart disease: Moderate aortic stenosis/regurgitation as well as 

moderate mitral regurgitation


Pulmonary infiltrates. From CHF most likely. Had normal procalcitonin,remained 

afebrile and Covid remained negative. 








DISCUSSION/RECOMMENDATION/PLAN 


Patient has remained hemodynanically stable in the last 48 hours. CT brain of 2/ 2/21 showed indistinct grey white matter differentiation which could be artefact

or early edema from diffuse infacrt or anoxic injury.


Patient also has acute renal failure with marked azotemia.


Encephalopathy could be from several possibilities.


Patient's initial problem was pulmonary congestion which most likely is due to 

CHF exacerbation


With acute renal failure and fluid overload, patient can benefit from 

Hemodilayis with UF.


I cannot in all honesty diagnose anoxic brain injury as the cause of his 

encephalopathy at this.


I there recommend repeat CT brain since MRI cannot be obtained.


Also neurology consult and trial of hemodialysis is recommended.


With marked azotemia and likely uremia HD sooner than later is recommended


Continue supportive care.


Follow I/O, electrolytes and renal function


Critically ill with guarded prognosis at best.

## 2021-02-05 NOTE — PRG
DATE OF SERVICE:  02/05/2021



An order was placed to discontinue sedation until the morning.  His sedation was

held.  Mr. Wick cannot be ventilated, off sedation unfortunately.  He still 
only

opens his eyes, stares at the ceiling, does not respond to pain other than to 
open

his eyes, does not make eye contact and does not follow commands.  I have 
chemically

paralyzed him and turned up his ventilatory rate.  I have EEG here, doing an EEG
off

sedation while he is paralyzed to see what we have about background rhythm. 



There may be a misunderstanding about his clinical condition when he arrived in 
the

ICU, but he was pulseless when he got to the ICU after being transferred from 
the

floor, after being intubated and being noted to be bradycardic.  He required

multiple amps of epinephrine to get a pulse on him when he arrived here.  It was

anticipated that he would pass away within that first hour, but he stabilized.  
I

truly believe he has a hypoperfusion injury and at this point, he is unlikely to

recover functionally.  His other multiple comorbidities make his mortality 
extremely

high. 



Critical care time 30 min.



Job ID:  [id number]



MTDD

## 2021-02-05 NOTE — PDOC.EEG
Neurology EEG Report





- Report


Report: 





This EEG was performed using 24 channel [employer] video EEG machine with 24 disc 

electrodes. 


Digital analysis of the EEG was done for spike and seizure detection which 

revealed no abnormalities.





Background:


The posterior background rhythm is not observed.   





Hyperventilation:


Not performed.





Photic Stimulation:


No significant response.





Sleep: 


No stage change is observed.





Spells:


None





EEG Diagnosis:


 


Low amplitude EEG with generalized irregular delta  activity with rare theta  

seen throughout the recording.


Absence of posterior background rhythm.





Clinical Interpretation:


This EEG is consistent  with  severe  generalized nonspecific cerebral 

dysfunction.

## 2021-02-05 NOTE — RAD
AP CHEST:

 

Date:  02/05/2021

 

INDICATION:

Pneumonia follow-up. 

 

COMPARISON:  

02/04/2021. 

 

FINDINGS/IMPRESSION: 

ET tube and NG tube again noted. Hazy bilateral diffuse infiltrates. Probable small effusions and bib
asilar atelectasis. No acute interval change. 

 

 

POS: AGW

## 2021-02-05 NOTE — PDOC.HOSPP
- Subjective


Encounter Date: 02/03/21


Encounter Time: 11:45


Subjective: 


intubated





- Objective


Vital Signs & Weight: 


                             Vital Signs (12 hours)











  Temp Pulse Resp Pulse Ox


 


 02/05/21 14:16   83  


 


 02/05/21 14:00    16 


 


 02/05/21 13:00  98.0 F   


 


 02/05/21 12:00    14 


 


 02/05/21 10:35   83  


 


 02/05/21 10:00    14 


 


 02/05/21 09:00  98.2 F   


 


 02/05/21 08:00    18  96


 


 02/05/21 07:05   83  


 


 02/05/21 06:00    16 


 


 02/05/21 04:00  98.7 F   16 








                                     Weight











Admit Weight                   195 lb 3.408 oz


 


Weight                         208 lb 15.971 oz











                            Most Recent Monitor Data











Heart Rate from ECG            83


 


NIBP                           122/66


 


NIBP BP-Mean                   84


 


Respiration from ECG           14


 


SpO2                           93














I&O: 


                                        











 02/04/21 02/05/21 02/06/21





 06:59 06:59 06:59


 


Intake Total 2902.1 1756 234


 


Output Total 70 1937 220


 


Balance 2832.1 -181 14











Result Diagrams: 


                                 02/05/21 04:00





                                 02/05/21 04:00





Hospitalist ROS





- Review of Systems


Other: 





intubated





- Medication


Medications: 


Active Medications











Generic Name Dose Route Start Last Admin





  Trade Name Freq  PRN Reason Stop Dose Admin


 


Albuterol Sulfate  2 puff  01/28/21 01:00  02/05/21 14:16





  Albuterol 200 Puff (6.7gm Inhaler)  INH   2 puff





  [id number] SHIRLEY   Administration


 


Allopurinol  100 mg  01/28/21 09:00  02/05/21 10:38





  Allopurinol 100 Mg Tab  PO   Not Given





  DAILY SHIRLEY  


 


Aspirin  81 mg  01/29/21 09:00  02/05/21 10:38





  Aspirin Chewable 81 Mg Tab  PO   Not Given





  DAILY SHIRLEY  


 


Atorvastatin Calcium  40 mg  01/24/21 21:00  02/04/21 21:24





  Atorvastatin Calcium 40 Mg Tab  PO   40 mg





  HS SHIRLEY   Administration


 


Carvedilol  3.125 mg  01/29/21 17:00  02/05/21 15:16





  Carvedilol 3.125 Mg Tab  PO   Not Given





  BID- SHIRLEY  


 


Guaifenesin  600 mg  01/30/21 21:00  02/05/21 10:38





  Guaifenesin Sf Soln 200 Mg/10 Ml Udcup  PER TUBE   Not Given





  BID SHIRLEY  


 


Doxycycline Hyclate 100 mg/  100 mls @ 100 mls/hr  01/28/21 08:00  02/05/21 

09:03





  Sodium Chloride  IVPB   100 mls





  0800,2000 SHIRLEY   Administration


 


Fentanyl  100 mls @ 0 mls/hr  01/28/21 13:30  02/04/21 12:07





  Fentanyl Cadd  IV  02/27/21 13:30  100 mls





  INF SHIRLEY   Administration





  Protocol  





  Per Protocol  


 


Cefepime HCl 1 gm/ Sodium  100 mls @ 200 mls/hr  01/31/21 09:00  02/05/21 09:02





  Chloride  IVPB   100 mls





  DAILY SHIRLEY   Administration


 


Norepinephrine Bitartrate  250 mls @ 0 mls/hr  01/31/21 10:45  02/03/21 15:05





  Levophed  IVPB   250 mls





  INF SHIRLEY   Administration





  Protocol  





  Titrate  


 


Dextrose/Water  1,000 mls @ 50 mls/hr  02/03/21 17:50  02/05/21 10:38





  D5w  IV   1,000 mls





  .Q20H SHIRLEY   Administration


 


Lorazepam  2 mg  01/28/21 13:30  01/28/21 17:36





  Lorazepam 2 Mg/Ml Vial  SLOW IVP  02/27/21 13:30  2 mg





  Q1H PRN   Administration





  Breakthrough agitation  


 


Metoclopramide HCl  10 mg  01/30/21 07:30  02/05/21 13:25





  Metoclopramide Hcl 10 Mg/2 Ml Vial  IVP   10 mg





  Q6H SHIRLEY   Administration


 


Pantoprazole Sodium  40 mg  02/04/21 09:00  02/05/21 10:38





  Pantoprazole 40 Mg Granules Packet  PER TUBE   Not Given





  DAILY SHIRLEY  


 


Propofol  1,000 mg  01/28/21 13:30  02/02/21 16:44





  Propofol 1,000 Mg/100 Ml Vial  IV  02/27/21 13:30  1,000 mg





  INF PRN   Administration





  TO ACHIEVE GOAL RASS  





  Protocol  


 


Sodium Chloride  10 ml  01/24/21 09:00  02/05/21 09:04





  Flush - Normal Saline 10 Ml Syringe  IVF   10 ml





  Q12HR SHIRLEY   Administration


 


Sodium Chloride  10 ml  01/24/21 02:30  02/05/21 09:04





  Flush - Normal Saline 10 Ml Syringe  IVF   10 ml





  PRN PRN   Administration





  Saline Flush  














Hospitalist Exam


Vitals: 


                             Vital Signs (12 hours)











  Temp Pulse Resp Pulse Ox


 


 02/05/21 14:16   83  


 


 02/05/21 14:00    16 


 


 02/05/21 13:00  98.0 F   


 


 02/05/21 12:00    14 


 


 02/05/21 10:35   83  


 


 02/05/21 10:00    14 


 


 02/05/21 09:00  98.2 F   


 


 02/05/21 08:00    18  96


 


 02/05/21 07:05   83  


 


 02/05/21 06:00    16 


 


 02/05/21 04:00  98.7 F   16 








                                     Weight











Admit Weight                   195 lb 3.408 oz


 


Weight                         208 lb 15.971 oz











                            Most Recent Monitor Data











Heart Rate from ECG            83


 


NIBP                           122/66


 


NIBP BP-Mean                   84


 


Respiration from ECG           14


 


SpO2                           93














Neck: supple


Heart: no murmur


Respiratory: no wheezes, no rales


Gastrointestinal: soft, non-tender, normal bowel sounds


Extremities: 2+ LE edema





Hosp A/P





- Plan





MARIO: Due to hemodynamic factors related to hypotension, cardiac decompensation 

and diuretics/RAAS blocker. Creat continues to trend up. Cardiorenal syndrome 

remained a concern.


CKD 3.


Presumed Cardiogenic shock. Hypotension improved with dobutamine


Hyperkalemia: Resolved.


Metabolic acidosis: Resolved with alkali therapy


Metabolic alkalosis: Due to Volume contraction. Resolved with albumin.


Hypoalbuminemia


Hypernatremia


Acute respiratory failure requiring intubation


Cardiomyopathy with EF of 15%


Pulmonary infiltrates. ? Pulmonaray edema from CHF vs infective process. Has 

remained afebrile and Covid remained negative. Normal procalcitonin makes 

pulmonary congestion from CHF exacerbation more likely








PLAN


Start 5dw for hypernatremia. tube feeding was held due to high residuals.


Increase lasix to 40 q8h


Add metolazone.


Continue dobutamine


Monitor vitals.


Follow I/O, electrolytes and renal function


 


2/1 pt diuresing well on dobutamine drip. 


2/2 pt off doubutamine. will monitor. will continue iv diuretics. 


2/3 pt having high residuals, will get abd xray. pt on reglan.

## 2021-02-05 NOTE — PDOC.EVN
Event Note





- Event Note


Event Note: 





spoke with [place] about pt's overall medical issues. she states that she will be

there tomorrow with her uncle and will make the final decision.

## 2021-02-05 NOTE — PDOC.HOSPP
- Subjective


Encounter Date: 02/04/21


Encounter Time: 12:30


Subjective: 


pt intubated





- Objective


Vital Signs & Weight: 


                             Vital Signs (12 hours)











  Temp Pulse Resp Pulse Ox


 


 02/05/21 14:16   83  


 


 02/05/21 14:00    16 


 


 02/05/21 13:00  98.0 F   


 


 02/05/21 12:00    14 


 


 02/05/21 10:35   83  


 


 02/05/21 10:00    14 


 


 02/05/21 09:00  98.2 F   


 


 02/05/21 08:00    18  96


 


 02/05/21 07:05   83  


 


 02/05/21 06:00    16 








                                     Weight











Admit Weight                   195 lb 3.408 oz


 


Weight                         208 lb 15.971 oz











                            Most Recent Monitor Data











Heart Rate from ECG            83


 


NIBP                           122/66


 


NIBP BP-Mean                   84


 


Respiration from ECG           14


 


SpO2                           93














I&O: 


                                        











 02/04/21 02/05/21 02/06/21





 06:59 06:59 06:59


 


Intake Total 2902.1 1756 234


 


Output Total 70 1937 220


 


Balance 2832.1 -181 14











Result Diagrams: 


                                 02/05/21 04:00





                                 02/05/21 04:00





Hospitalist ROS





- Review of Systems


Other: 





intubated





- Medication


Medications: 


Active Medications











Generic Name Dose Route Start Last Admin





  Trade Name Freq  PRN Reason Stop Dose Admin


 


Albuterol Sulfate  2 puff  01/28/21 01:00  02/05/21 14:16





  Albuterol 200 Puff (6.7gm Inhaler)  INH   2 puff





  [id number] SHIRLEY   Administration


 


Allopurinol  100 mg  01/28/21 09:00  02/05/21 10:38





  Allopurinol 100 Mg Tab  PO   Not Given





  DAILY SHIRLEY  


 


Aspirin  81 mg  01/29/21 09:00  02/05/21 10:38





  Aspirin Chewable 81 Mg Tab  PO   Not Given





  DAILY SHIRLEY  


 


Atorvastatin Calcium  40 mg  01/24/21 21:00  02/04/21 21:24





  Atorvastatin Calcium 40 Mg Tab  PO   40 mg





  HS SHIRLEY   Administration


 


Carvedilol  3.125 mg  01/29/21 17:00  02/05/21 15:16





  Carvedilol 3.125 Mg Tab  PO   Not Given





  BID- SHIRLEY  


 


Guaifenesin  600 mg  01/30/21 21:00  02/05/21 10:38





  Guaifenesin Sf Soln 200 Mg/10 Ml Udcup  PER TUBE   Not Given





  BID SHIRLEY  


 


Doxycycline Hyclate 100 mg/  100 mls @ 100 mls/hr  01/28/21 08:00  02/05/21 

09:03





  Sodium Chloride  IVPB   100 mls





  0800,2000 SHIRLEY   Administration


 


Fentanyl  100 mls @ 0 mls/hr  01/28/21 13:30  02/04/21 12:07





  Fentanyl Cadd  IV  02/27/21 13:30  100 mls





  INF SHIRLEY   Administration





  Protocol  





  Per Protocol  


 


Cefepime HCl 1 gm/ Sodium  100 mls @ 200 mls/hr  01/31/21 09:00  02/05/21 09:02





  Chloride  IVPB   100 mls





  DAILY SHIRLEY   Administration


 


Norepinephrine Bitartrate  250 mls @ 0 mls/hr  01/31/21 10:45  02/03/21 15:05





  Levophed  IVPB   250 mls





  INF SHIRLEY   Administration





  Protocol  





  Titrate  


 


Dextrose/Water  1,000 mls @ 50 mls/hr  02/03/21 17:50  02/05/21 10:38





  D5w  IV   1,000 mls





  .Q20H SHIRLEY   Administration


 


Lorazepam  2 mg  01/28/21 13:30  01/28/21 17:36





  Lorazepam 2 Mg/Ml Vial  SLOW IVP  02/27/21 13:30  2 mg





  Q1H PRN   Administration





  Breakthrough agitation  


 


Metoclopramide HCl  10 mg  01/30/21 07:30  02/05/21 13:25





  Metoclopramide Hcl 10 Mg/2 Ml Vial  IVP   10 mg





  Q6H SHIRLEY   Administration


 


Pantoprazole Sodium  40 mg  02/04/21 09:00  02/05/21 10:38





  Pantoprazole 40 Mg Granules Packet  PER TUBE   Not Given





  DAILY SHIRLEY  


 


Propofol  1,000 mg  01/28/21 13:30  02/02/21 16:44





  Propofol 1,000 Mg/100 Ml Vial  IV  02/27/21 13:30  1,000 mg





  INF PRN   Administration





  TO ACHIEVE GOAL RASS  





  Protocol  


 


Sodium Chloride  10 ml  01/24/21 09:00  02/05/21 09:04





  Flush - Normal Saline 10 Ml Syringe  IVF   10 ml





  Q12HR SHIRLEY   Administration


 


Sodium Chloride  10 ml  01/24/21 02:30  02/05/21 09:04





  Flush - Normal Saline 10 Ml Syringe  IVF   10 ml





  PRN PRN   Administration





  Saline Flush  














Hospitalist Exam


Vitals: 


                             Vital Signs (12 hours)











  Temp Pulse Resp Pulse Ox


 


 02/05/21 14:16   83  


 


 02/05/21 14:00    16 


 


 02/05/21 13:00  98.0 F   


 


 02/05/21 12:00    14 


 


 02/05/21 10:35   83  


 


 02/05/21 10:00    14 


 


 02/05/21 09:00  98.2 F   


 


 02/05/21 08:00    18  96


 


 02/05/21 07:05   83  


 


 02/05/21 06:00    16 








                                     Weight











Admit Weight                   195 lb 3.408 oz


 


Weight                         208 lb 15.971 oz











                            Most Recent Monitor Data











Heart Rate from ECG            83


 


NIBP                           122/66


 


NIBP BP-Mean                   84


 


Respiration from ECG           14


 


SpO2                           93














Neck: supple


Heart: RRR


Respiratory: CTAB, no wheezes, no rales





Hosp A/P





- Plan





MARIO: Due to hemodynamic factors related to hypotension, cardiac decompensation 

and diuretics/RAAS blocker. Creat continues to trend up. Cardiorenal syndrome 

remained a concern.


CKD 3.


Presumed Cardiogenic shock. Hypotension improved with dobutamine


Hyperkalemia: Resolved.


Metabolic acidosis: Resolved with alkali therapy


Metabolic alkalosis: Due to Volume contraction. Resolved with albumin.


Hypoalbuminemia


Hypernatremia


Acute respiratory failure requiring intubation


Cardiomyopathy with EF of 15%


Pulmonary infiltrates. ? Pulmonaray edema from CHF vs infective process. Has 

remained afebrile and Covid remained negative. Normal procalcitonin makes 

pulmonary congestion from CHF exacerbation more likely








PLAN


Start 5dw for hypernatremia. tube feeding was held due to high residuals.


Increase lasix to 40 q8h


Add metolazone.


Continue dobutamine


Monitor vitals.


Follow I/O, electrolytes and renal function


 


2/1 pt diuresing well on dobutamine drip. 


2/2 pt off doubutamine. will monitor. will continue iv diuretics. 


2/3 pt having high residuals, will get abd xray. pt on reglan. 


2/4 Family meeting unable to attend will call KUNAL Barahona. pt's residual are 

still high. he has not had a bm. will add prn meds. will stop all sedation 

today. will get MRi brain. ct brain non conclusive. will get neurology to see 

pt. [place] called and updated.

## 2021-02-05 NOTE — PRG
DATE OF SERVICE:  02/05/2021



SUBJECTIVE:  Louie Wick opens his eyes, but does not follow commands or make 
eye

contact.  He only opens his eyes to physical stimuli. 



OBJECTIVE:  VITAL SIGNS:  Heart rate is 80, respiratory rate is in the low 20s, 
FiO2

is at 50%. 

LUNGS:  Remarkable for coarse equal breath sounds. 

HEART:  Regular rhythm. 

ABDOMEN:  Soft. 

EXTREMITIES:  Without clubbing, cyanosis, or edema.



LABORATORY DATA:  White count 16.4, hemoglobin 9, and platelets 251.  Creatinine

6.87 and .  Urine output is 130 mL over 24 hours.  He had 1800 mL out of 
his

gastric tube. 



IMPRESSION:  

1. Respiratory failure after codes, associated with severe anoxic brain injury.

2. Aortic stenosis, mitral regurgitation, and a systolic cardiomyopathy.

3. Acute renal failure, not a candidate for dialysis given his heart disease and
his

anoxic brain injury. 

Withdrawal of care would be appropriate in my opinion.



Critical care time 30 min.



Job ID:  [id number]



MTDD

## 2021-02-05 NOTE — PDOC.HOSPP
- Subjective


Encounter Date: 02/02/21


Encounter Time: 10:30


Subjective: 


pt intubated





- Objective


Vital Signs & Weight: 


                             Vital Signs (12 hours)











  Temp Pulse Resp Pulse Ox


 


 02/05/21 14:16   83  


 


 02/05/21 14:00    16 


 


 02/05/21 13:00  98.0 F   


 


 02/05/21 12:00    14 


 


 02/05/21 10:35   83  


 


 02/05/21 10:00    14 


 


 02/05/21 09:00  98.2 F   


 


 02/05/21 08:00    18  96


 


 02/05/21 07:05   83  


 


 02/05/21 06:00    16 


 


 02/05/21 04:00  98.7 F   16 








                                     Weight











Admit Weight                   195 lb 3.408 oz


 


Weight                         208 lb 15.971 oz











                            Most Recent Monitor Data











Heart Rate from ECG            83


 


NIBP                           122/66


 


NIBP BP-Mean                   84


 


Respiration from ECG           14


 


SpO2                           93














I&O: 


                                        











 02/04/21 02/05/21 02/06/21





 06:59 06:59 06:59


 


Intake Total 2902.1 1756 234


 


Output Total 70 1937 220


 


Balance 2832.1 -181 14











Result Diagrams: 


                                 02/05/21 04:00





                                 02/05/21 04:00





Hospitalist ROS





- Review of Systems


Other: 





intubated





- Medication


Medications: 


Active Medications











Generic Name Dose Route Start Last Admin





  Trade Name Freq  PRN Reason Stop Dose Admin


 


Albuterol Sulfate  2 puff  01/28/21 01:00  02/05/21 14:16





  Albuterol 200 Puff (6.7gm Inhaler)  INH   2 puff





  [id number] SHIRLEY   Administration


 


Allopurinol  100 mg  01/28/21 09:00  02/05/21 10:38





  Allopurinol 100 Mg Tab  PO   Not Given





  DAILY SHIRLEY  


 


Aspirin  81 mg  01/29/21 09:00  02/05/21 10:38





  Aspirin Chewable 81 Mg Tab  PO   Not Given





  DAILY SHIRLEY  


 


Atorvastatin Calcium  40 mg  01/24/21 21:00  02/04/21 21:24





  Atorvastatin Calcium 40 Mg Tab  PO   40 mg





  HS SHIRLEY   Administration


 


Carvedilol  3.125 mg  01/29/21 17:00  02/05/21 15:16





  Carvedilol 3.125 Mg Tab  PO   Not Given





  BID-[facility]  


 


Guaifenesin  600 mg  01/30/21 21:00  02/05/21 10:38





  Guaifenesin Sf Soln 200 Mg/10 Ml Udcup  PER TUBE   Not Given





  BID SHIRLEY  


 


Doxycycline Hyclate 100 mg/  100 mls @ 100 mls/hr  01/28/21 08:00  02/05/21 

09:03





  Sodium Chloride  IVPB   100 mls





  0800,2000 SHIRLEY   Administration


 


Fentanyl  100 mls @ 0 mls/hr  01/28/21 13:30  02/04/21 12:07





  Fentanyl Cadd  IV  02/27/21 13:30  100 mls





  INF SHIRLEY   Administration





  Protocol  





  Per Protocol  


 


Cefepime HCl 1 gm/ Sodium  100 mls @ 200 mls/hr  01/31/21 09:00  02/05/21 09:02





  Chloride  IVPB   100 mls





  DAILY SHIRLEY   Administration


 


Norepinephrine Bitartrate  250 mls @ 0 mls/hr  01/31/21 10:45  02/03/21 15:05





  Levophed  IVPB   250 mls





  INF SHIRLEY   Administration





  Protocol  





  Titrate  


 


Dextrose/Water  1,000 mls @ 50 mls/hr  02/03/21 17:50  02/05/21 10:38





  D5w  IV   1,000 mls





  .Q20H SHIRLEY   Administration


 


Lorazepam  2 mg  01/28/21 13:30  01/28/21 17:36





  Lorazepam 2 Mg/Ml Vial  SLOW IVP  02/27/21 13:30  2 mg





  Q1H PRN   Administration





  Breakthrough agitation  


 


Metoclopramide HCl  10 mg  01/30/21 07:30  02/05/21 13:25





  Metoclopramide Hcl 10 Mg/2 Ml Vial  IVP   10 mg





  Q6H SHIRLEY   Administration


 


Pantoprazole Sodium  40 mg  02/04/21 09:00  02/05/21 10:38





  Pantoprazole 40 Mg Granules Packet  PER TUBE   Not Given





  DAILY SHIRLEY  


 


Propofol  1,000 mg  01/28/21 13:30  02/02/21 16:44





  Propofol 1,000 Mg/100 Ml Vial  IV  02/27/21 13:30  1,000 mg





  INF PRN   Administration





  TO ACHIEVE GOAL RASS  





  Protocol  


 


Sodium Chloride  10 ml  01/24/21 09:00  02/05/21 09:04





  Flush - Normal Saline 10 Ml Syringe  IVF   10 ml





  Q12HR SHIRLEY   Administration


 


Sodium Chloride  10 ml  01/24/21 02:30  02/05/21 09:04





  Flush - Normal Saline 10 Ml Syringe  IVF   10 ml





  PRN PRN   Administration





  Saline Flush  














Hospitalist Exam


Vitals: 


                             Vital Signs (12 hours)











  Temp Pulse Resp Pulse Ox


 


 02/05/21 14:16   83  


 


 02/05/21 14:00    16 


 


 02/05/21 13:00  98.0 F   


 


 02/05/21 12:00    14 


 


 02/05/21 10:35   83  


 


 02/05/21 10:00    14 


 


 02/05/21 09:00  98.2 F   


 


 02/05/21 08:00    18  96


 


 02/05/21 07:05   83  


 


 02/05/21 06:00    16 


 


 02/05/21 04:00  98.7 F   16 








                                     Weight











Admit Weight                   195 lb 3.408 oz


 


Weight                         208 lb 15.971 oz











                            Most Recent Monitor Data











Heart Rate from ECG            83


 


NIBP                           122/66


 


NIBP BP-Mean                   84


 


Respiration from ECG           14


 


SpO2                           93














Neck: supple


Heart: no murmur


Respiratory: no wheezes, no rales


Gastrointestinal: soft, non-tender, normal bowel sounds


Extremities: 2+ LE edema





Hosp A/P





- Plan





MARIO: Due to hemodynamic factors related to hypotension, cardiac decompensation 

and diuretics/RAAS blocker. Creat continues to trend up. Cardiorenal syndrome 

remained a concern.


CKD 3.


Presumed Cardiogenic shock. Hypotension improved with dobutamine


Hyperkalemia: Resolved.


Metabolic acidosis: Resolved with alkali therapy


Metabolic alkalosis: Due to Volume contraction. Resolved with albumin.


Hypoalbuminemia


Hypernatremia


Acute respiratory failure requiring intubation


Cardiomyopathy with EF of 15%


Pulmonary infiltrates. ? Pulmonaray edema from CHF vs infective process. Has 

remained afebrile and Covid remained negative. Normal procalcitonin makes 

pulmonary congestion from CHF exacerbation more likely








PLAN


Start 5dw for hypernatremia. tube feeding was held due to high residuals.


Increase lasix to 40 q8h


Add metolazone.


Continue dobutamine


Monitor vitals.


Follow I/O, electrolytes and renal function


 


2/1 pt diuresing well on dobutamine drip. 


2/2 pt off doubutamine. will monitor. will continue iv diuretics.

## 2021-02-06 ENCOUNTER — HOSPITAL ENCOUNTER (INPATIENT)
Dept: HOSPITAL 92 - CCU | Age: 76
DRG: 951 | End: 2021-02-06
Attending: FAMILY MEDICINE | Admitting: FAMILY MEDICINE
Payer: MEDICARE

## 2021-02-06 VITALS — TEMPERATURE: 98.9 F

## 2021-02-06 DIAGNOSIS — E78.5: ICD-10-CM

## 2021-02-06 DIAGNOSIS — F20.9: ICD-10-CM

## 2021-02-06 DIAGNOSIS — I48.91: ICD-10-CM

## 2021-02-06 DIAGNOSIS — Z51.5: Primary | ICD-10-CM

## 2021-02-06 DIAGNOSIS — I42.8: ICD-10-CM

## 2021-02-06 DIAGNOSIS — F03.90: ICD-10-CM

## 2021-02-06 DIAGNOSIS — A41.9: ICD-10-CM

## 2021-02-06 DIAGNOSIS — M10.9: ICD-10-CM

## 2021-02-06 DIAGNOSIS — J18.9: ICD-10-CM

## 2021-02-06 DIAGNOSIS — I12.9: ICD-10-CM

## 2021-02-06 DIAGNOSIS — E11.22: ICD-10-CM

## 2021-02-06 DIAGNOSIS — Z90.49: ICD-10-CM

## 2021-02-06 LAB
ANION GAP SERPL CALC-SCNC: 23 MMOL/L (ref 10–20)
BASOPHILS # BLD AUTO: 0.1 THOU/UL (ref 0–0.2)
BASOPHILS NFR BLD AUTO: 0.4 % (ref 0–1)
BUN SERPL-MCNC: 124 MG/DL (ref 8.4–25.7)
CALCIUM SERPL-MCNC: 8.8 MG/DL (ref 7.8–10.44)
CHLORIDE SERPL-SCNC: 95 MMOL/L (ref 98–107)
CO2 SERPL-SCNC: 21 MMOL/L (ref 23–31)
CREAT CL PREDICTED SERPL C-G-VRATE: 11 ML/MIN (ref 70–130)
EOSINOPHIL # BLD AUTO: 0.2 THOU/UL (ref 0–0.7)
EOSINOPHIL NFR BLD AUTO: 1.5 % (ref 0–10)
GLUCOSE SERPL-MCNC: 99 MG/DL (ref 83–110)
HGB BLD-MCNC: 9.4 G/DL (ref 14–18)
LYMPHOCYTES # BLD: 1.2 THOU/UL (ref 1.2–3.4)
LYMPHOCYTES NFR BLD AUTO: 8.7 % (ref 21–51)
MCH RBC QN AUTO: 27.3 PG (ref 27–31)
MCV RBC AUTO: 83.7 FL (ref 78–98)
MONOCYTES # BLD AUTO: 1.4 THOU/UL (ref 0.11–0.59)
MONOCYTES NFR BLD AUTO: 10.3 % (ref 0–10)
NEUTROPHILS # BLD AUTO: 11.1 THOU/UL (ref 1.4–6.5)
NEUTROPHILS NFR BLD AUTO: 79.1 % (ref 42–75)
PLATELET # BLD AUTO: 256 THOU/UL (ref 130–400)
POTASSIUM SERPL-SCNC: 4.8 MMOL/L (ref 3.5–5.1)
RBC # BLD AUTO: 3.43 MILL/UL (ref 4.7–6.1)
SODIUM SERPL-SCNC: 134 MMOL/L (ref 136–145)
WBC # BLD AUTO: 14 THOU/UL (ref 4.8–10.8)

## 2021-02-06 RX ADMIN — ASPIRIN 81 MG CHEWABLE TABLET SCH: 81 TABLET CHEWABLE at 08:38

## 2021-02-06 RX ADMIN — PANTOPRAZOLE SODIUM SCH MG: 40 GRANULE, DELAYED RELEASE ORAL at 08:32

## 2021-02-06 RX ADMIN — Medication SCH MLS: at 03:49

## 2021-02-06 RX ADMIN — ALBUTEROL SULFATE SCH PUFF: 108 INHALANT RESPIRATORY (INHALATION) at 06:29

## 2021-02-06 RX ADMIN — GUAIFENESIN SCH MG: 200 SOLUTION ORAL at 08:32

## 2021-02-06 RX ADMIN — ALBUTEROL SULFATE SCH PUFF: 108 INHALANT RESPIRATORY (INHALATION) at 02:18

## 2021-02-06 RX ADMIN — ALBUTEROL SULFATE SCH PUFF: 108 INHALANT RESPIRATORY (INHALATION) at 13:44

## 2021-02-06 RX ADMIN — ASPIRIN 81 MG CHEWABLE TABLET SCH MG: 81 TABLET CHEWABLE at 08:32

## 2021-02-06 RX ADMIN — Medication SCH ML: at 08:32

## 2021-02-06 RX ADMIN — ALBUTEROL SULFATE SCH: 108 INHALANT RESPIRATORY (INHALATION) at 17:56

## 2021-02-06 NOTE — PDOC.NEPPN
- Subjective


Encounter Date: 02/06/21


Subjective: 


Seen and examined. Still intubated and mechanically ventilated.





- Objective


Vital Signs & Weight: 


                             Vital Signs (12 hours)











  Temp Pulse Resp Pulse Ox


 


 02/06/21 10:00    20 


 


 02/06/21 08:00  98.9 F   


 


 02/06/21 07:47    20  94 L


 


 02/06/21 06:29   110 H  


 


 02/06/21 06:00    20 


 


 02/06/21 04:00  98.5 F   20 


 


 02/06/21 02:16   79  


 


 02/06/21 02:00    20 


 


 02/06/21 00:00  98.8 F   


 


 02/05/21 23:44    20 








                                     Weight











Admit Weight                   195 lb 3.408 oz


 


Weight                         200 lb 2.876 oz











                            Most Recent Monitor Data











Heart Rate from ECG            115


 


NIBP                           83/52


 


NIBP BP-Mean                   62


 


Respiration from ECG           20


 


SpO2                           100














I&O: 


                                        











 02/05/21 02/06/21 02/07/21





 06:59 06:59 06:59


 


Intake Total 1756 1724.8 202


 


Output Total 1937 1105 45


 


Balance -181 619.8 157











Result Diagrams: 


                                 02/06/21 03:25





                                 02/06/21 03:25





Nephrology ROS





- Medication


Medications: 


Active Medications











Generic Name Dose Route Start Last Admin





  Trade Name Freq  PRN Reason Stop Dose Admin


 


Albuterol Sulfate  2 puff  01/28/21 01:00  02/06/21 06:29





  Albuterol 200 Puff (6.7gm Inhaler)  INH   2 puff





  [id number] SHIRLEY   Administration


 


Allopurinol  100 mg  01/28/21 09:00  02/06/21 08:32





  Allopurinol 100 Mg Tab  PO   100 mg





  DAILY SHIRLEY   Administration


 


Aspirin  81 mg  01/29/21 09:00  02/06/21 08:38





  Aspirin Chewable 81 Mg Tab  PO   Not Given





  DAILY SHIRLEY  


 


Atorvastatin Calcium  40 mg  01/24/21 21:00  02/05/21 20:43





  Atorvastatin Calcium 40 Mg Tab  PO   40 mg





  HS SHIRLEY   Administration


 


Carvedilol  3.125 mg  01/29/21 17:00  02/06/21 08:37





  Carvedilol 3.125 Mg Tab  PO   Not Given





  BID-WM SHIRLEY  


 


Guaifenesin  600 mg  01/30/21 21:00  02/06/21 08:32





  Guaifenesin Sf Soln 200 Mg/10 Ml Udcup  PER TUBE   600 mg





  BID SHIRLEY   Administration


 


Doxycycline Hyclate 100 mg/  100 mls @ 100 mls/hr  01/28/21 08:00  02/06/21 

08:31





  Sodium Chloride  IVPB   100 mls





  0800,2000 SHIRLEY   Administration


 


Fentanyl  100 mls @ 0 mls/hr  01/28/21 13:30  02/06/21 03:49





  Fentanyl Cadd  IV  02/27/21 13:30  100 mls





  INF SHIRLEY   Administration





  Protocol  





  Per Protocol  


 


Cefepime HCl 1 gm/ Sodium  100 mls @ 200 mls/hr  01/31/21 09:00  02/06/21 08:31





  Chloride  IVPB   100 mls





  DAILY SHIRLEY   Administration


 


Norepinephrine Bitartrate  250 mls @ 0 mls/hr  01/31/21 10:45  02/03/21 15:05





  Levophed  IVPB   250 mls





  INF SHIRLEY   Administration





  Protocol  





  Titrate  


 


Dextrose/Water  1,000 mls @ 50 mls/hr  02/03/21 17:50  02/05/21 10:38





  D5w  IV   1,000 mls





  .Q20H SHIRLEY   Administration


 


Lorazepam  2 mg  01/28/21 13:30  02/06/21 03:32





  Lorazepam 2 Mg/Ml Vial  SLOW IVP  02/27/21 13:30  2 mg





  Q1H PRN   Administration





  Breakthrough agitation  


 


Metoclopramide HCl  10 mg  01/30/21 07:30  02/06/21 08:36





  Metoclopramide Hcl 10 Mg/2 Ml Vial  IVP   10 mg





  Q6H SHIRLEY   Administration


 


Pantoprazole Sodium  40 mg  02/04/21 09:00  02/06/21 08:32





  Pantoprazole 40 Mg Granules Packet  PER TUBE   40 mg





  DAILY SHIRLEY   Administration


 


Propofol  1,000 mg  01/28/21 13:30  02/02/21 16:44





  Propofol 1,000 Mg/100 Ml Vial  IV  02/27/21 13:30  1,000 mg





  INF PRN   Administration





  TO ACHIEVE GOAL RASS  





  Protocol  


 


Sodium Chloride  10 ml  01/24/21 09:00  02/06/21 08:32





  Flush - Normal Saline 10 Ml Syringe  IVF   10 ml





  Q12HR SHIRLEY   Administration


 


Sodium Chloride  10 ml  01/24/21 02:30  02/05/21 09:04





  Flush - Normal Saline 10 Ml Syringe  IVF   10 ml





  PRN PRN   Administration





  Saline Flush  














- Exam


General - other findings: unresponsive.


ENT: normocephalic atraumatic


ENT - other findings: ET tube in place


Neck: symmetric


Respiratory - other findings: ventilator transmitted sounf noted


Cardiovascular: RRR


Gastrointestinal: diminished bowl sounds


Extremities - other findings: bilateral leg fullness. mild to moderate bilateral

UE edema


Neurological - other findings: unresponsive. On fentanyl infusion.





Nephrology Results





- Labs


Result Diagrams: 


                                 02/06/21 03:25





                                 02/06/21 03:25


Lab results: 


                                        











WBC  14.0 thou/uL (4.8-10.8)  H  02/06/21  03:25    


 


Hgb  9.4 g/dL (14.0-18.0)  L  02/06/21  03:25    


 


Hct  28.7 % (42.0-52.0)  L  02/06/21  03:25    


 


MCV  83.7 fL (78.0-98.0)   02/06/21  03:25    


 


Plt Count  256 thou/uL (130-400)   02/06/21  03:25    


 


Neutrophils %  79.1 % (42.0-75.0)  H  02/06/21  03:25    


 


Band Neuts % (Manual)  1 % (5-11)  L  01/24/21  05:33    


 


ABG pH  7.35  (7.35-7.45)   02/02/21  06:30    


 


ABG pCO2  46.9 mmHg (35.0-45.0)  H  02/02/21  06:30    


 


ABG pO2  51.2 mmHg (> 70.0)  L*  02/02/21  06:30    


 


VBG pH  7.42  (7.32-7.43)   01/28/21  06:30    


 


VBG pCO2  30.9 mmHg (42.0-51.0)  L  01/28/21  06:30    


 


VBG pO2  65.3 mmHg (35.0-45.0)  H  01/28/21  06:30    


 


Sodium  134 mmol/L (136-145)  L  02/06/21  03:25    


 


Potassium  4.8 mmol/L (3.5-5.1)   02/06/21  03:25    


 


Chloride  95 mmol/L ()  L  02/06/21  03:25    


 


Carbon Dioxide  21 mmol/L (23-31)  L  02/06/21  03:25    


 


BUN  124 mg/dL (8.4-25.7)  H  02/06/21  03:25    


 


Creatinine  7.91 mg/dL (0.7-1.3)  H  02/06/21  03:25    


 


Glucose  99 mg/dL ()   02/06/21  03:25    


 


Lactic Acid  1.7 mmol/L (0.5-2.2)   01/24/21  01:52    


 


Calcium  8.8 mg/dL (7.8-10.44)   02/06/21  03:25    


 


Total Bilirubin  0.7 mg/dL (0.2-1.2)   01/31/21  03:40    


 


AST  49 U/L (5-34)  H  01/31/21  03:40    


 


ALT  96 U/L (8-55)  H  01/31/21  03:40    


 


Alkaline Phosphatase  130 U/L ()  H  01/31/21  03:40    


 


CK-MB (CK-2)  1.4 ng/mL (0-6.6)   01/23/21  20:40    


 


Troponin I  0.245 ng/mL (< 0.028)  H  01/24/21  02:37    


 


C-Reactive Protein  12.35 mg/dL (= or < 0.5)  H  01/28/21  04:48    


 


B-Natriuretic Peptide  3710.8 pg/mL (0-100)  H  01/27/21  16:22    


 


Serum Total Protein  6.1 g/dL (5.8-8.1)   01/31/21  03:40    


 


Albumin  3.1 g/dL (3.4-4.8)  L  01/31/21  03:40    


 


Lipase  4 U/L (8-78)  L  01/28/21  04:48    


 


Urine Ketones  Negative mg/dL (Negative)   01/29/21  10:00    


 


Urine Blood  1+  (Negative)  A  01/29/21  10:00    


 


Urine Nitrite  Negative  (Negative)   01/29/21  10:00    


 


Ur Leukocyte Esterase  25 Shannon/uL (Negative)  A  01/29/21  10:00    


 


Urine RBC  11-20 HPF (0-3)  A  01/29/21  10:00    


 


Urine WBC  7-10 HPF (0-3)  A  01/29/21  10:00    


 


Ur Squamous Epith Cells  None Seen HPF (0-3)   01/29/21  10:00    


 


Urine Bacteria  1+ HPF (None Seen)  A  01/29/21  10:00    








                                        











Sodium  134 mmol/L (136-145)  L  02/06/21  03:25    


 


Potassium  4.8 mmol/L (3.5-5.1)   02/06/21  03:25    


 


Chloride  95 mmol/L ()  L  02/06/21  03:25    


 


Carbon Dioxide  21 mmol/L (23-31)  L  02/06/21  03:25    


 


Anion Gap  23 mmol/L (10-20)  H  02/06/21  03:25    


 


BUN  124 mg/dL (8.4-25.7)  H  02/06/21  03:25    


 


Creatinine  7.91 mg/dL (0.7-1.3)  H  02/06/21  03:25    


 


Glucose  99 mg/dL ()   02/06/21  03:25    


 


Calcium  8.8 mg/dL (7.8-10.44)   02/06/21  03:25    


 


Phosphorus  4.7 mg/dL (2.3-4.7)   02/02/21  03:20    


 


Magnesium  1.9 mg/dL (1.6-2.6)   02/02/21  03:20    


 


Albumin  3.1 g/dL (3.4-4.8)  L  01/31/21  03:40    














Nephrology AP PN





- Plan


Acute renal failure: Due to hemodynamic factors related to hypotension, cardiac 

decompensation and diuretics/RAAS blocker with superimposed ATN. Creat and BUN 

continues to trend up. Creat is still trending up. Now Oliguric


CKD 3.


Presumed Cardiogenic shock. S/p Dobutamin and later Levophed. Off Levophed


Hyperkalemia: Resolved.


Metabolic acidosis: 


Metabolic alkalosis: Due to Volume contraction. Resolved


Hypoalbuminemia


Hypernatremia. Resolved.


Acute respiratory failure requiring intubation


Cardiomyopathy with EF of 15%


Valvular heart disease: Moderate aortic stenosis/regurgitation as well as m

oderate mitral regurgitation


Pulmonary infiltrates. From CHF most likely. Had normal procalcitonin,remained 

afebrile and Covid remained negative. 


Acute encephalopathy. EEG showed severe diffuse cerebral dysnfuction. Patient 

has multiple possible etiology including Uremic encephalopathy, Sedatives as 

well as ischemic/anoxic brain injury.








RECOMMENDATION/PLAN 


Trial of dialysis will be prudent if relatives wants continuation of aggressive 

therapy. 


Await Neurology input.


Relatives will be around to make decision of goal of care later today.


Continue supportive care.


Follow I/O, electrolytes and renal function


Critically ill with guarded prognosis at best.

## 2021-02-06 NOTE — PRG
DATE OF SERVICE:  02/06/2021



SUBJECTIVE:  Louie Wick remains unresponsive.  With sternal rub, he opens his

eyes, stares and ceiling with an upward gaze, but does not make eye contact.  
This

is same as he has been for several days. 



OBJECTIVE:  VITAL SIGNS:  Blood pressure 105/89, heart rate is 102, respiratory 
rate

20 per mechanical ventilation. 

LUNGS:  Remarkably equal breath sounds. 

HEART:  Regular rhythm. 

ABDOMEN:  Soft.



LABORATORY DATA:  Intake and outputs, positive 619.  White count 14, hemoglobin 
9.4,

platelets 256.  Sodium 134, potassium 4.8, chloride 95, bicarb 21, ,

creatinine __________. 



IMPRESSION:  

1. Respiratory failure after multiple codes in one day.

2. Severe cardiomyopathy.

3. Valvular heart disease.

4. Severe anoxic brain injury.

5. Progressive renal failure.

6. Schizophrenia.

7. Do not resuscitate status. 

I was told that family may arrive today to withdrawal support.  I feel that this

would be appropriate. 



Critical care time 30 min.



Job ID:  [id number]



MTDD

## 2021-02-06 NOTE — CON
NEUROLOGY CONSULTATION





DATE OF CONSULTATION:  02/06/2021



REASON FOR CONSULTATION:  Altered mental status/anoxic brain injury.



HISTORY OF PRESENT ILLNESS:  Mr. Louie Wick is a 75-year-old male with

history significant for atrial fibrillation, dementia, nonischemic 
cardiomyopathy

with ejection fraction of 10% to 15%, diabetes, hyperlipidemia, and chronic 
kidney

disease, presented to the emergency department as a transfer from [place],

because of multifocal pneumonia and altered mental status.  The patient is 
unable to

provide the history.  No family at bedside, so history is obtained from review 
of

the medical records.  The patient is currently intubated and sedated and there 
is a

question about prognosis since there is no improvement in the last several days.

There is no improvement and he continues to have persistent confusion.  As part 
of

workup, head CT was done, which shows signs of anoxic brain injury.  An EEG 
showed

severe generalized nonspecific cerebral dysfunction. 



REVIEW OF SYSTEMS:  Unable to obtain secondary to patient's mental status.



PAST MEDICAL HISTORY:  Schizophrenia, gout, diabetes.



PAST SURGICAL HISTORY:  Appendectomy.



FAMILY HISTORY:  No significant family history.



SOCIAL HISTORY:  No documented history of alcohol or illegal drug abuse.



Allergies: No known drug allergies



Vital Signs & Weight: 

                             Vital Signs (12 hours)







  Temp Pulse Resp Pulse Ox

 

 02/06/21 10:00    20 

 

 02/06/21 08:00  98.9 F   

 

 02/06/21 07:47    20  94 L

 

 02/06/21 06:29   110 H  

 

 02/06/21 06:00    20 

 

 02/06/21 04:00  98.5 F   20 

 

 02/06/21 02:16   79  

 

 02/06/21 02:00    20 

 

 02/06/21 00:00  98.8 F   

 

 02/05/21 23:44    20 





                                     Weight







Admit Weight                   195 lb 3.408 oz

 

Weight                         200 lb 2.876 oz







                            Most Recent Monitor Data







Heart Rate from ECG            115

 

NIBP                           83/52

 

NIBP BP-Mean                   62

 

Respiration from ECG           20

 

SpO2                           100









I&O: 

                                        







 02/05/21 02/06/21 02/07/21



 06:59 06:59 06:59

 

Intake Total 1756 1724.8 202

 

Output Total 1937 1105 45

 

Balance -181 619.8 157







  

Active Medications







Generic Name Dose Route Start Last Admin



  Trade Name Freq  PRN Reason Stop Dose Admin

 

Albuterol Sulfate  2 puff  01/28/21 01:00  02/06/21 06:29



  Albuterol 200 Puff (6.7gm Inhaler)  INH   2 puff



  [id number] SHIRLEY   Administration

 

Allopurinol  100 mg  01/28/21 09:00  02/06/21 08:32



  Allopurinol 100 Mg Tab  PO   100 mg



  DAILY SHIRLEY   Administration

 

Aspirin  81 mg  01/29/21 09:00  02/06/21 08:38



  Aspirin Chewable 81 Mg Tab  PO   Not Given



  DAILY SHIRLEY  

 

Atorvastatin Calcium  40 mg  01/24/21 21:00  02/05/21 20:43



  Atorvastatin Calcium 40 Mg Tab  PO   40 mg



  HS SHIRLEY   Administration

 

Carvedilol  3.125 mg  01/29/21 17:00  02/06/21 08:37



  Carvedilol 3.125 Mg Tab  PO   Not Given



  BID-WM SHIRLEY  

 

Guaifenesin  600 mg  01/30/21 21:00  02/06/21 08:32



  Guaifenesin Sf Soln 200 Mg/10 Ml Udcup  PER TUBE   600 mg



  BID SHIRLEY   Administration

 

Doxycycline Hyclate 100 mg/  100 mls @ 100 mls/hr  01/28/21 08:00  02/06/21 
08:31



  Sodium Chloride  IVPB   100 mls



  0800,2000 SHIRLEY   Administration

 

Fentanyl  100 mls @ 0 mls/hr  01/28/21 13:30  02/06/21 03:49



  Fentanyl Cadd  IV  02/27/21 13:30  100 mls



  INF SHIRLEY   Administration



  Protocol  



  Per Protocol  

 

Cefepime HCl 1 gm/ Sodium  100 mls @ 200 mls/hr  01/31/21 09:00  02/06/21 08:31



  Chloride  IVPB   100 mls



  DAILY SHIRLEY   Administration

 

Norepinephrine Bitartrate  250 mls @ 0 mls/hr  01/31/21 10:45  02/03/21 15:05



  Levophed  IVPB   250 mls



  INF SHIRLEY   Administration



  Protocol  



  Titrate  

 

Dextrose/Water  1,000 mls @ 50 mls/hr  02/03/21 17:50  02/05/21 10:38



  D5w  IV   1,000 mls



  .Q20H SHIRLEY   Administration

 

Lorazepam  2 mg  01/28/21 13:30  02/06/21 03:32



  Lorazepam 2 Mg/Ml Vial  SLOW IVP  02/27/21 13:30  2 mg



  Q1H PRN   Administration



  Breakthrough agitation  

 

Metoclopramide HCl  10 mg  01/30/21 07:30  02/06/21 08:36



  Metoclopramide Hcl 10 Mg/2 Ml Vial  IVP   10 mg



  Q6H SHIRLEY   Administration

 

Pantoprazole Sodium  40 mg  02/04/21 09:00  02/06/21 08:32



  Pantoprazole 40 Mg Granules Packet  PER TUBE   40 mg



  DAILY SHIRLEY   Administration

 

Propofol  1,000 mg  01/28/21 13:30  02/02/21 16:44



  Propofol 1,000 Mg/100 Ml Vial  IV  02/27/21 13:30  1,000 mg



  INF PRN   Administration



  TO ACHIEVE GOAL RASS  



  Protocol  

 

Sodium Chloride  10 ml  01/24/21 09:00  02/06/21 08:32



  Flush - Normal Saline 10 Ml Syringe  IVF   10 ml



  Q12HR SHIRLEY   Administration

 

Sodium Chloride  10 ml  01/24/21 02:30  02/05/21 09:04



  Flush - Normal Saline 10 Ml Syringe  IVF   10 ml



  PRN PRN   Administration



  Saline Flush  











PHYSICAL EXAMINATION:

General - other findings: unresponsive.

ENT - other findings: ET tube in place

Neck: symmetric

Cardiovascular: RRR

Gastrointestinal: diminished bowl sounds

Extremities - other findings: bilateral leg fullness. mild to moderate bilateral
UE edema

Neurological   The patient is sedated and intubated.  He does not follow 
commands.  Does

not maintain eye contact.  Opens eyes spontaneously, but does not track.  No 
roving

eye movements or gaze preference seen. Cranial nerves, pupils 3 mm round, 
minimally responsive to light.  Face

symmetric.  Tongue midline.  Corneals positive.  Motor; muscle, tone, and bulk 
are

normal.  No spontaneous movement of all 4 extremities seen.  Sensory, no 
withdrawal

of all 4 extremities to nailbed pressure.  Gait deferred due to patient's mental

status.  Cerebellar could not be performed secondary to patient's mental status.




 



 



Data reviewed:

EEG reviewed which was negative for seizure activity but showed severe 
generalized nonspecific cerebral dysfunction.



Lab results: 

                                        







WBC  14.0 thou/uL (4.8-10.8)  H  02/06/21  03:25    

 

Hgb  9.4 g/dL (14.0-18.0)  L  02/06/21  03:25    

 

Hct  28.7 % (42.0-52.0)  L  02/06/21  03:25    

 

MCV  83.7 fL (78.0-98.0)   02/06/21  03:25    

 

Plt Count  256 thou/uL (130-400)   02/06/21  03:25    

 

Neutrophils %  79.1 % (42.0-75.0)  H  02/06/21  03:25    

 

Band Neuts % (Manual)  1 % (5-11)  L  01/24/21  05:33    

 

ABG pH  7.35  (7.35-7.45)   02/02/21  06:30    

 

ABG pCO2  46.9 mmHg (35.0-45.0)  H  02/02/21  06:30    

 

ABG pO2  51.2 mmHg (> 70.0)  L*  02/02/21  06:30    

 

VBG pH  7.42  (7.32-7.43)   01/28/21  06:30    

 

VBG pCO2  30.9 mmHg (42.0-51.0)  L  01/28/21  06:30    

 

VBG pO2  65.3 mmHg (35.0-45.0)  H  01/28/21  06:30    

 

Sodium  134 mmol/L (136-145)  L  02/06/21  03:25    

 

Potassium  4.8 mmol/L (3.5-5.1)   02/06/21  03:25    

 

Chloride  95 mmol/L ()  L  02/06/21  03:25    

 

Carbon Dioxide  21 mmol/L (23-31)  L  02/06/21  03:25    

 

BUN  124 mg/dL (8.4-25.7)  H  02/06/21  03:25    

 

Creatinine  7.91 mg/dL (0.7-1.3)  H  02/06/21  03:25    

 

Glucose  99 mg/dL ()   02/06/21  03:25    

 

Lactic Acid  1.7 mmol/L (0.5-2.2)   01/24/21  01:52    

 

Calcium  8.8 mg/dL (7.8-10.44)   02/06/21  03:25    

 

Total Bilirubin  0.7 mg/dL (0.2-1.2)   01/31/21  03:40    

 

AST  49 U/L (5-34)  H  01/31/21  03:40    

 

ALT  96 U/L (8-55)  H  01/31/21  03:40    

 

Alkaline Phosphatase  130 U/L ()  H  01/31/21  03:40    

 

CK-MB (CK-2)  1.4 ng/mL (0-6.6)   01/23/21  20:40    

 

Troponin I  0.245 ng/mL (< 0.028)  H  01/24/21  02:37    

 

C-Reactive Protein  12.35 mg/dL (= or < 0.5)  H  01/28/21  04:48    

 

B-Natriuretic Peptide  3710.8 pg/mL (0-100)  H  01/27/21  16:22    

 

Serum Total Protein  6.1 g/dL (5.8-8.1)   01/31/21  03:40    

 

Albumin  3.1 g/dL (3.4-4.8)  L  01/31/21  03:40    

 

Lipase  4 U/L (8-78)  L  01/28/21  04:48    

 

Urine Ketones  Negative mg/dL (Negative)   01/29/21  10:00    

 

Urine Blood  1+  (Negative)  A  01/29/21  10:00    

 

Urine Nitrite  Negative  (Negative)   01/29/21  10:00    

 

Ur Leukocyte Esterase  25 Shannon/uL (Negative)  A  01/29/21  10:00    

 

Urine RBC  11-20 HPF (0-3)  A  01/29/21  10:00    

 

Urine WBC  7-10 HPF (0-3)  A  01/29/21  10:00    

 

Ur Squamous Epith Cells  None Seen HPF (0-3)   01/29/21  10:00    

 

Urine Bacteria  1+ HPF (None Seen)  A  01/29/21  10:00    





                                        







Sodium  134 mmol/L (136-145)  L  02/06/21  03:25    

 

Potassium  4.8 mmol/L (3.5-5.1)   02/06/21  03:25    

 

Chloride  95 mmol/L ()  L  02/06/21  03:25    

 

Carbon Dioxide  21 mmol/L (23-31)  L  02/06/21  03:25    

 

Anion Gap  23 mmol/L (10-20)  H  02/06/21  03:25    

 

BUN  124 mg/dL (8.4-25.7)  H  02/06/21  03:25    

 

Creatinine  7.91 mg/dL (0.7-1.3)  H  02/06/21  03:25    

 

Glucose  99 mg/dL ()   02/06/21  03:25    

 

Calcium  8.8 mg/dL (7.8-10.44)   02/06/21  03:25    

 

Phosphorus  4.7 mg/dL (2.3-4.7)   02/02/21  03:20    

 

Magnesium  1.9 mg/dL (1.6-2.6)   02/02/21  03:20    

 

Albumin  3.1 g/dL (3.4-4.8)  L  01/31/21  03:40    









ASSESSMENT AND PLAN:  





Mr. Louie Wick is a 75-year-old male with multiple

comorbidities consulted for altered mental status/anoxic brain injury.  The

neurological exam is however limited secondary to sedation, but seems like there
is

no improvement in neurological status for more than 48 hours.  His head CT was

reviewed which showed signs of anoxic brain injury which has a poor prognosis.

Also, the EEG showed severe generalized cerebral dysfunction, which also leads 
to

poor prognosis.  The patient currently seems to be in a persistent vegetative 
state,

so chances of functional meaningful recovery seems grave at this time.  Continue

medical management per primary team.  Palliative care consult.  No further 
testing from Neurology perspective.

Plan discussed this with the nursing staff



Thank you for the consult.







Job ID:  [id number]



MTDD

## 2021-02-06 NOTE — PDOC.HOSPP
- Subjective


Encounter Date: 21


Encounter Time: 08:30


Subjective: 


awakens with eye opening to deep stimuli, not in distress, is on vent with 

fentanyl of 20mcg





- Objective


Vital Signs & Weight: 


                             Vital Signs (12 hours)











  Temp Pulse Resp Pulse Ox


 


 21 16:00    20 


 


 21 15:31   98  


 


 21 14:00    20 


 


 21 12:00    20 


 


 21 11:06   89  


 


 21 10:00    20 


 


 21 08:00  98.9 F   


 


 21 07:47    20  94 L


 


 21 06:29   110 H  


 


 21 06:00    20 








                                     Weight











Admit Weight                   195 lb 3.408 oz


 


Weight                         200 lb 2.876 oz











                            Most Recent Monitor Data











Heart Rate from ECG            112


 


NIBP                           120/70


 


NIBP BP-Mean                   86


 


Respiration from ECG           15


 


SpO2                           96














I&O: 


                                        











 21





 06:59 06:59 06:59


 


Intake Total 1756 1724.8 202


 


Output Total 1937 1105 95


 


Balance -181 619.8 107











Result Diagrams: 


                                 21 03:25





                                 21 03:25





Hospitalist ROS





- Medication


Medications: 


Active Medications











Generic Name Dose Route Start Last Admin





  Trade Name Freq  PRN Reason Stop Dose Admin


 


Albuterol Sulfate  2 puff  21 01:00  21 13:44





  Albuterol 200 Puff (6.7gm Inhaler)  INH   2 puff





  [id number] SHIRLEY   Administration


 


Allopurinol  100 mg  21 09:00  21 08:32





  Allopurinol 100 Mg Tab  PO   100 mg





  DAILY SHIRLEY   Administration


 


Aspirin  81 mg  21 09:00  21 08:38





  Aspirin Chewable 81 Mg Tab  PO   Not Given





  DAILY SHIRLEY  


 


Atorvastatin Calcium  40 mg  21 21:00  21 20:43





  Atorvastatin Calcium 40 Mg Tab  PO   40 mg





  HS SHIRLEY   Administration


 


Carvedilol  3.125 mg  21 17:00  21 16:32





  Carvedilol 3.125 Mg Tab  PO   Not Given





  BID-WM SHIRLEY  


 


Guaifenesin  600 mg  21 21:00  21 08:32





  Guaifenesin Sf Soln 200 Mg/10 Ml Udcup  PER TUBE   600 mg





  BID SHIRLEY   Administration


 


Doxycycline Hyclate 100 mg/  100 mls @ 100 mls/hr  21 08:00  21 

08:31





  Sodium Chloride  IVPB   100 mls





  0800,2000 SHIRLEY   Administration


 


Fentanyl  100 mls @ 0 mls/hr  21 13:30  21 03:49





  Fentanyl Cadd  IV  21 13:30  100 mls





  INF SHIRLEY   Administration





  Protocol  





  Per Protocol  


 


Cefepime HCl 1 gm/ Sodium  100 mls @ 200 mls/hr  21 09:00  21 08:31





  Chloride  IVPB   100 mls





  DAILY SHIRLEY   Administration


 


Norepinephrine Bitartrate  250 mls @ 0 mls/hr  21 10:45  21 15:05





  Levophed  IVPB   250 mls





  INF SHIRLEY   Administration





  Protocol  





  Titrate  


 


Dextrose/Water  1,000 mls @ 50 mls/hr  21 17:50  21 10:38





  D5w  IV   1,000 mls





  .Q20H SHIRLEY   Administration


 


Lorazepam  2 mg  21 13:30  21 03:32





  Lorazepam 2 Mg/Ml Vial  SLOW IVP  21 13:30  2 mg





  Q1H PRN   Administration





  Breakthrough agitation  


 


Metoclopramide HCl  10 mg  21 07:30  21 14:44





  Metoclopramide Hcl 10 Mg/2 Ml Vial  IVP   Not Given





  Q6H SHIRLEY  


 


Pantoprazole Sodium  40 mg  21 09:00  21 08:32





  Pantoprazole 40 Mg Granules Packet  PER TUBE   40 mg





  DAILY SHIRLEY   Administration


 


Propofol  1,000 mg  21 13:30  21 16:44





  Propofol 1,000 Mg/100 Ml Vial  IV  21 13:30  1,000 mg





  INF PRN   Administration





  TO ACHIEVE GOAL RASS  





  Protocol  


 


Sodium Chloride  10 ml  21 09:00  21 08:32





  Flush - Normal Saline 10 Ml Syringe  IVF   10 ml





  Q12HR SHIRLEY   Administration


 


Sodium Chloride  10 ml  21 02:30  21 09:04





  Flush - Normal Saline 10 Ml Syringe  IVF   10 ml





  PRN PRN   Administration





  Saline Flush  














Hospitalist Exam


Vitals: 


                             Vital Signs (12 hours)











  Temp Pulse Resp Pulse Ox


 


 21 16:00    20 


 


 21 15:31   98  


 


 21 14:00    20 


 


 21 12:00    20 


 


 21 11:06   89  


 


 21 10:00    20 


 


 21 08:00  98.9 F   


 


 21 07:47    20  94 L


 


 21 06:29   110 H  


 


 21 06:00    20 








                                     Weight











Admit Weight                   195 lb 3.408 oz


 


Weight                         200 lb 2.876 oz











                            Most Recent Monitor Data











Heart Rate from ECG            112


 


NIBP                           120/70


 


NIBP BP-Mean                   86


 


Respiration from ECG           15


 


SpO2                           96














General Appearance: ill appearing


Eye: PERRL, anicteric sclera


ENT: no oropharyngeal lesions, moist mucosa


Neck: supple, no JVD


Heart: RRR, no murmur


Respiratory: no wheezes, rales, rhonchi


Gastrointestinal: soft, non-tender, non-distended, normal bowel sounds


Extremities: no cyanosis, no edema


Neurological: no focal deficits





Hosp A/P


(1) Acute respiratory failure with hypoxia and hypercarbia


Code(s): J96.01 - ACUTE RESPIRATORY FAILURE WITH HYPOXIA; J96.02 - ACUTE 

RESPIRATORY FAILURE WITH HYPERCAPNIA   Status: Acute   





(2) Acute on chronic systolic (congestive) heart failure


Code(s): I50.23 - ACUTE ON CHRONIC SYSTOLIC (CONGESTIVE) HEART FAILURE   Status:

Acute   





(3) Pneumonia


Code(s): J18.9 - PNEUMONIA, UNSPECIFIED ORGANISM   Status: Acute   


Qualifiers: 


   Pneumonia type: due to unspecified organism   Laterality: bilateral 





(4) Nonischemic cardiomyopathy


Code(s): I42.8 - OTHER CARDIOMYOPATHIES   Status: Chronic   





(5) Atrial fibrillation


Code(s): I48.91 - UNSPECIFIED ATRIAL FIBRILLATION   Status: Chronic   


Qualifiers: 


   Atrial fibrillation type: paroxysmal   Qualified Code(s): I48.0 - Paroxysmal 

atrial fibrillation   





(6) GERD (gastroesophageal reflux disease)


Code(s): K21.9 - GASTRO-ESOPHAGEAL REFLUX DISEASE WITHOUT ESOPHAGITIS   Status: 

Chronic   


Qualifiers: 


   Esophagitis presence: without esophagitis   Qualified Code(s): K21.9 - 

Gastro-esophageal reflux disease without esophagitis   





(7) Hyperlipidemia


Code(s): E78.5 - HYPERLIPIDEMIA, UNSPECIFIED   Status: Chronic   


Qualifiers: 


 





(8) Hypertension


Code(s): I10 - ESSENTIAL (PRIMARY) HYPERTENSION   Status: Chronic   


Qualifiers: 


 





(9) Type 2 diabetes mellitus


Status: Chronic   


Qualifiers: 


   Diabetes mellitus long term insulin use: without long term use 





(10) Hypocalcemia


Code(s): E83.51 - HYPOCALCEMIA   Status: Resolved   





(11) Hypokalemia


Code(s): E87.6 - HYPOKALEMIA   Status: Resolved   





(12) Acute renal failure


Status: Acute   





(13) Brain anoxic injury


Status: Suspected   





- Plan





poor prognosis, has multiple organ failures including resp, cardiac, renal and 

now suspected anoxic brain injury (although witnessed and prompt resuscitative 

measures were done on the day he coded)


d/w lilli  and brother at bedside this afternoon, they want him to have

comfort care with hospice and will go for withdrawal of care this evening once 

 home arrangements are made.


prior ef of 15%, likely is noncomplaint?


has nearly 6 covid pcr testing done all of which have been -ve


continue eliquis, asp, lipitor.


Family have opted for [facility] and  has arranged the same.


d/w  and is in agreement with above plan, may go for withdrawal of care 

anytime the family is ready for it.

## 2021-02-06 NOTE — RAD
CHEST 1 VIEW:

 

Date:  02/06/2021

 

INDICATION:

75-year-old male with pneumonia. 

 

COMPARISON:  

Prior exam dated 02/05/2021. 

 

IMPRESSION: 

Patient remains intubated with gastric catheter placement. Bilateral pneumonia is stable. Small pleur
al effusions persist. No pneumothorax evident. 

 

 

POS: [facility]

## 2021-02-07 NOTE — DIS
DATE OF ADMISSION:  01/23/2021



DATE OF DISCHARGE:  02/06/2021



DISCHARGE DISPOSITION:  [facility].



PRIMARY DISCHARGE DIAGNOSES:  Acute respiratory failure with hypoxia, acute on

chronic congestive heart failure exacerbation with systolic dysfunction, bilateral

pneumonia, possible anoxic brain injury, acute renal failure. 



SECONDARY DISCHARGE DIAGNOSES:  Nonischemic cardiomyopathy, chronic atrial

fibrillation, gastroesophageal reflux disease, dyslipidemia, hypertension, diabetes

mellitus type 2. 



PROCEDURES DONE DURING HOSPITALIZATION:  Chest x-ray done showed bilateral airspace

disease.  No pneumothorax.  CT angio chest done on the day of admission showed no

evidence of pulmonary embolus.  There are severe CAT scan abnormalities of

multifocal pneumonia suspicious for COVID.  MRI brain done on 01/24/2021, showed no

acute infarct or intracranial hemorrhage, but this was limited due to motion

artifact.  Echo with 2D Doppler done on 02/01/2021, showed an EF of 15% to 20%,

moderate aortic stenosis, moderate aortic regurgitation, moderate mitral

regurgitation.  CT brain done on 02/02/2021, showed indistinct gray-white

differentiation in both cerebral hemispheres.  This could be benign artifact versus

early edema related to diffuse infarct from hypoperfusion or an anoxic injury.  No

evidence of hemorrhage was seen.  EEG done on 02/05/2021, showed findings consistent

with severe generalized nonspecific cerebral dysfunction. 



LABORATORY DATA:  Urine culture, no growth.  H and H of 9 and 28, platelet count

256.  Discharge BUN and creatinine of 124 and 7.9, serum bicarb 21 on the day of

discharge.  BNP was 3710.  COVID-19 PCR was not detected on 01/23/2021. 



DISCHARGE MEDICATIONS:  Morphine, Ativan p.r.n. for comfort.



INPATIENT CONSULT:  Dr. Angle for Pulmonology, Dr. Valadez for Neurology, Dr. Varela

for Nephrology, Dr. Watts for Cardiology. 



BRIEF COURSE DURING HOSPITALIZATION:  The patient initially got admitted on the 23rd

with complaints of shortness of breath and altered mental state.  He was also found

to have had CHF exacerbation with prior systolic dysfunction and ejection fraction

of around 15%.  The patient had bilateral infiltrates on the x-ray.  He has had

nearly 6 COVID-19 PCRs done, all of which were negative.  The patient was placed on

broad-spectrum antibiotics and was gently diuresed.  During the course of his stay,

the patient became more obtunded and a code was called on 01/28/2021.  He was

intubated.  Post-intubation, the patient was in junctional rhythm and bradycardia.

He was given multiple doses of epinephrine and was resuscitated in ICU.  He was

briefly on epinephrine drip, which was soon discontinued.  The patient did not

improve from then on, and his cognitive status was declining.  He has had an EEG

done, which showed severe nonspecific cerebral dysfunction.  A CT brain done on the

2nd showed findings of indistinct gray-white differentiation suggesting cerebral

edema from likely anoxic injury.  In view of multiple organ failures and possible

anoxic brain injury, multiple conversations were held with the patient's niece, Ms. Barahona and her brother Mr. Rico Wick.  They are the known two living family

members of the patient as he apparently has had no children or was not .  The

niece and brother did not want him to suffer anymore and went for withdrawal of care

with [facility].  He has been transitioned over to inpatient hospice on the

6th.  His overall prognosis is very poor. 







Job ID:  [id number]

## 2021-02-10 NOTE — EKG
Test Reason : 

Blood Pressure : ***/*** mmHG

Vent. Rate : 090 BPM     Atrial Rate : 090 BPM

   P-R Int : 172 ms          QRS Dur : 100 ms

    QT Int : 410 ms       P-R-T Axes : 039 -11 094 degrees

   QTc Int : 501 ms

 

Normal sinus rhythm

Possible Left atrial enlargement

Anterior infarct , age undetermined

Abnormal ECG

 

Confirmed by BARBARA MORENO M.D. ([id number]),  CHERISE FRANKEL ([id number]) on 2/10/2021 12:32:50 PM

 

Referred By:             Confirmed By:BARBARA MORENO M.D.